# Patient Record
Sex: MALE | Race: WHITE | NOT HISPANIC OR LATINO | Employment: OTHER | URBAN - METROPOLITAN AREA
[De-identification: names, ages, dates, MRNs, and addresses within clinical notes are randomized per-mention and may not be internally consistent; named-entity substitution may affect disease eponyms.]

---

## 2020-12-29 PROBLEM — C61 PROSTATE CANCER (HCC): Status: ACTIVE | Noted: 2020-12-29

## 2020-12-30 ENCOUNTER — HOSPITAL ENCOUNTER (EMERGENCY)
Facility: HOSPITAL | Age: 75
Discharge: HOME | End: 2020-12-30
Attending: EMERGENCY MEDICINE
Payer: COMMERCIAL

## 2020-12-30 ENCOUNTER — APPOINTMENT (EMERGENCY)
Dept: RADIOLOGY | Facility: HOSPITAL | Age: 75
End: 2020-12-30
Payer: COMMERCIAL

## 2020-12-30 ENCOUNTER — OFFICE VISIT (OUTPATIENT)
Dept: FAMILY MEDICINE CLINIC | Facility: CLINIC | Age: 75
End: 2020-12-30
Payer: COMMERCIAL

## 2020-12-30 VITALS
HEIGHT: 69 IN | TEMPERATURE: 97.8 F | WEIGHT: 202.8 LBS | DIASTOLIC BLOOD PRESSURE: 82 MMHG | BODY MASS INDEX: 30.04 KG/M2 | SYSTOLIC BLOOD PRESSURE: 124 MMHG

## 2020-12-30 VITALS
WEIGHT: 203 LBS | HEIGHT: 69 IN | BODY MASS INDEX: 30.07 KG/M2 | DIASTOLIC BLOOD PRESSURE: 72 MMHG | OXYGEN SATURATION: 98 % | RESPIRATION RATE: 20 BRPM | SYSTOLIC BLOOD PRESSURE: 182 MMHG | HEART RATE: 74 BPM | TEMPERATURE: 97.9 F

## 2020-12-30 DIAGNOSIS — Z85.46 HISTORY OF PROSTATE CANCER: ICD-10-CM

## 2020-12-30 DIAGNOSIS — I48.91 ATRIAL FIBRILLATION, NEW ONSET (HCC): Primary | ICD-10-CM

## 2020-12-30 DIAGNOSIS — I49.1 PAC (PREMATURE ATRIAL CONTRACTION): Primary | ICD-10-CM

## 2020-12-30 DIAGNOSIS — I65.21 STENOSIS OF RIGHT CAROTID ARTERY: ICD-10-CM

## 2020-12-30 DIAGNOSIS — I63.81 LEFT SIDED LACUNAR INFARCTION (HCC): ICD-10-CM

## 2020-12-30 LAB
ALBUMIN SERPL BCP-MCNC: 3.8 G/DL (ref 3.5–5)
ALP SERPL-CCNC: 95 U/L (ref 46–116)
ALT SERPL W P-5'-P-CCNC: 42 U/L (ref 12–78)
ANION GAP SERPL CALCULATED.3IONS-SCNC: 9 MMOL/L (ref 4–13)
APTT PPP: 35 SECONDS (ref 23–37)
AST SERPL W P-5'-P-CCNC: 33 U/L (ref 5–45)
ATRIAL RATE: 62 BPM
BASOPHILS # BLD AUTO: 0.02 THOUSANDS/ΜL (ref 0–0.1)
BASOPHILS NFR BLD AUTO: 0 % (ref 0–1)
BILIRUB SERPL-MCNC: 1.06 MG/DL (ref 0.2–1)
BUN SERPL-MCNC: 13 MG/DL (ref 5–25)
CALCIUM SERPL-MCNC: 9.4 MG/DL (ref 8.3–10.1)
CHLORIDE SERPL-SCNC: 103 MMOL/L (ref 100–108)
CO2 SERPL-SCNC: 26 MMOL/L (ref 21–32)
CREAT SERPL-MCNC: 0.76 MG/DL (ref 0.6–1.3)
EOSINOPHIL # BLD AUTO: 0.05 THOUSAND/ΜL (ref 0–0.61)
EOSINOPHIL NFR BLD AUTO: 1 % (ref 0–6)
ERYTHROCYTE [DISTWIDTH] IN BLOOD BY AUTOMATED COUNT: 12.4 % (ref 11.6–15.1)
GFR SERPL CREATININE-BSD FRML MDRD: 88 ML/MIN/1.73SQ M
GLUCOSE SERPL-MCNC: 84 MG/DL (ref 65–140)
HCT VFR BLD AUTO: 46.6 % (ref 36.5–49.3)
HGB BLD-MCNC: 15.2 G/DL (ref 12–17)
IMM GRANULOCYTES # BLD AUTO: 0.01 THOUSAND/UL (ref 0–0.2)
IMM GRANULOCYTES NFR BLD AUTO: 0 % (ref 0–2)
INR PPP: 1.07 (ref 0.84–1.19)
LYMPHOCYTES # BLD AUTO: 1.71 THOUSANDS/ΜL (ref 0.6–4.47)
LYMPHOCYTES NFR BLD AUTO: 22 % (ref 14–44)
MAGNESIUM SERPL-MCNC: 2.4 MG/DL (ref 1.6–2.6)
MCH RBC QN AUTO: 30.2 PG (ref 26.8–34.3)
MCHC RBC AUTO-ENTMCNC: 32.6 G/DL (ref 31.4–37.4)
MCV RBC AUTO: 93 FL (ref 82–98)
MONOCYTES # BLD AUTO: 0.6 THOUSAND/ΜL (ref 0.17–1.22)
MONOCYTES NFR BLD AUTO: 8 % (ref 4–12)
NEUTROPHILS # BLD AUTO: 5.35 THOUSANDS/ΜL (ref 1.85–7.62)
NEUTS SEG NFR BLD AUTO: 69 % (ref 43–75)
NRBC BLD AUTO-RTO: 0 /100 WBCS
P AXIS: 61 DEGREES
PLATELET # BLD AUTO: 190 THOUSANDS/UL (ref 149–390)
PMV BLD AUTO: 10.7 FL (ref 8.9–12.7)
POTASSIUM SERPL-SCNC: 4.8 MMOL/L (ref 3.5–5.3)
PR INTERVAL: 166 MS
PROT SERPL-MCNC: 7.6 G/DL (ref 6.4–8.2)
PROTHROMBIN TIME: 13.7 SECONDS (ref 11.6–14.5)
QRS AXIS: 47 DEGREES
QRSD INTERVAL: 90 MS
QT INTERVAL: 422 MS
QTC INTERVAL: 428 MS
RBC # BLD AUTO: 5.03 MILLION/UL (ref 3.88–5.62)
SODIUM SERPL-SCNC: 138 MMOL/L (ref 136–145)
T WAVE AXIS: 38 DEGREES
TROPONIN I SERPL-MCNC: <0.02 NG/ML
TSH SERPL DL<=0.05 MIU/L-ACNC: 1.55 UIU/ML (ref 0.36–3.74)
VENTRICULAR RATE: 62 BPM
WBC # BLD AUTO: 7.74 THOUSAND/UL (ref 4.31–10.16)

## 2020-12-30 PROCEDURE — 80053 COMPREHEN METABOLIC PANEL: CPT | Performed by: EMERGENCY MEDICINE

## 2020-12-30 PROCEDURE — 99285 EMERGENCY DEPT VISIT HI MDM: CPT | Performed by: EMERGENCY MEDICINE

## 2020-12-30 PROCEDURE — 85025 COMPLETE CBC W/AUTO DIFF WBC: CPT | Performed by: EMERGENCY MEDICINE

## 2020-12-30 PROCEDURE — 85610 PROTHROMBIN TIME: CPT | Performed by: EMERGENCY MEDICINE

## 2020-12-30 PROCEDURE — 99215 OFFICE O/P EST HI 40 MIN: CPT | Performed by: FAMILY MEDICINE

## 2020-12-30 PROCEDURE — 84443 ASSAY THYROID STIM HORMONE: CPT | Performed by: EMERGENCY MEDICINE

## 2020-12-30 PROCEDURE — 71046 X-RAY EXAM CHEST 2 VIEWS: CPT

## 2020-12-30 PROCEDURE — 1160F RVW MEDS BY RX/DR IN RCRD: CPT | Performed by: FAMILY MEDICINE

## 2020-12-30 PROCEDURE — 85730 THROMBOPLASTIN TIME PARTIAL: CPT | Performed by: EMERGENCY MEDICINE

## 2020-12-30 PROCEDURE — 93010 ELECTROCARDIOGRAM REPORT: CPT | Performed by: INTERNAL MEDICINE

## 2020-12-30 PROCEDURE — 36415 COLL VENOUS BLD VENIPUNCTURE: CPT | Performed by: EMERGENCY MEDICINE

## 2020-12-30 PROCEDURE — 93005 ELECTROCARDIOGRAM TRACING: CPT

## 2020-12-30 PROCEDURE — 93000 ELECTROCARDIOGRAM COMPLETE: CPT | Performed by: FAMILY MEDICINE

## 2020-12-30 PROCEDURE — 1036F TOBACCO NON-USER: CPT | Performed by: FAMILY MEDICINE

## 2020-12-30 PROCEDURE — 83735 ASSAY OF MAGNESIUM: CPT | Performed by: EMERGENCY MEDICINE

## 2020-12-30 PROCEDURE — 84484 ASSAY OF TROPONIN QUANT: CPT | Performed by: EMERGENCY MEDICINE

## 2020-12-30 PROCEDURE — 99285 EMERGENCY DEPT VISIT HI MDM: CPT

## 2020-12-30 RX ORDER — CLOPIDOGREL BISULFATE 75 MG/1
75 TABLET ORAL DAILY
COMMUNITY
End: 2020-12-30 | Stop reason: SDUPTHER

## 2020-12-30 RX ORDER — CLOPIDOGREL BISULFATE 75 MG/1
75 TABLET ORAL DAILY
Qty: 90 TABLET | Refills: 1 | Status: SHIPPED | OUTPATIENT
Start: 2020-12-30 | End: 2021-06-22

## 2020-12-30 NOTE — ED PROVIDER NOTES
History  Chief Complaint   Patient presents with    Abnormal ECG     patient at PCP office, was told his heart was skipping a beat, denies chest pain or palpitations  History provided by:  Patient   used: No    Medical Problem  Quality:  Patient was at Dr office for Plavix refill, send to ER for abnormlaity noted on EKG, ?skipped beat, patient has no symptoms  Severity:  Mild  Duration: has no sympotms  Timing:  Unable to specify  Progression:  Unable to specify  Chronicity:  New  Context:  Patient was at Dr office for Plavix refill, send to ER for abnormlaity noted on EKG, ?skipped beat, patient has no symptoms  Relieved by:  Nothing  Worsened by:  Nothing  Ineffective treatments:  None  Associated symptoms: no abdominal pain, no chest pain, no cough, no diarrhea, no ear pain, no fever, no loss of consciousness, no rash, no shortness of breath, no sore throat and no vomiting    Risk factors:  Carotid stenosis      Prior to Admission Medications   Prescriptions Last Dose Informant Patient Reported? Taking? clopidogrel (PLAVIX) 75 mg tablet   No Yes   Sig: Take 1 tablet (75 mg total) by mouth daily      Facility-Administered Medications: None       Past Medical History:   Diagnosis Date    Cancer [facility])     prostate    Carotid stenosis     Hypertension        Past Surgical History:   Procedure Laterality Date    TONSILLECTOMY      TRANSURETHRAL RESECTION OF PROSTATE         Family History   Problem Relation Age of Onset    Colon cancer Mother     No Known Problems Father     Leukemia Maternal Grandmother     Diabetes Maternal Grandmother     Alcohol abuse Maternal Grandfather     No Known Problems Paternal Grandmother     No Known Problems Paternal Grandfather      I have reviewed and agree with the history as documented      E-Cigarette/Vaping     E-Cigarette/Vaping Substances     Social History     Tobacco Use    Smoking status: Former Smoker    Smokeless tobacco: Never Used   Substance Use Topics    Alcohol use: Yes    Drug use: Never       Review of Systems   Constitutional: Negative for chills and fever  HENT: Negative for ear pain and sore throat  Eyes: Negative for pain and visual disturbance  Respiratory: Negative for cough and shortness of breath  Cardiovascular: Negative for chest pain and palpitations  Gastrointestinal: Negative for abdominal pain, diarrhea and vomiting  Genitourinary: Negative for dysuria and hematuria  Musculoskeletal: Negative for arthralgias and back pain  Skin: Negative for color change and rash  Neurological: Negative for seizures, loss of consciousness and syncope  All other systems reviewed and are negative  Physical Exam  Physical Exam  Vitals signs and nursing note reviewed  Constitutional:       Appearance: He is well-developed  HENT:      Head: Normocephalic and atraumatic  Eyes:      Conjunctiva/sclera: Conjunctivae normal    Neck:      Musculoskeletal: Neck supple  Cardiovascular:      Rate and Rhythm: Normal rate and regular rhythm  Heart sounds: No murmur  Pulmonary:      Effort: Pulmonary effort is normal  No respiratory distress  Breath sounds: Normal breath sounds  Abdominal:      Palpations: Abdomen is soft  Tenderness: There is no abdominal tenderness  Skin:     General: Skin is warm and dry  Neurological:      Mental Status: He is alert           Vital Signs  ED Triage Vitals   Temperature Pulse Respirations Blood Pressure SpO2   12/30/20 1421 12/30/20 1421 12/30/20 1421 12/30/20 1423 12/30/20 1421   97 9 °F (36 6 °C) 75 18 (!) 213/98 98 %      Temp src Heart Rate Source Patient Position - Orthostatic VS BP Location FiO2 (%)   -- 12/30/20 1421 -- 12/30/20 1421 --    Monitor  Right arm       Pain Score       --                  Vitals:    12/30/20 1421 12/30/20 1423 12/30/20 1430   BP:  (!) 213/98 (!) 182/72   Pulse: 75  74         Visual Acuity      ED Medications  Medications - No data to display    Diagnostic Studies  Results Reviewed     Procedure Component Value Units Date/Time    Comprehensive metabolic panel [[id number]]  (Abnormal) Collected: 12/30/20 1437    Lab Status: Final result Specimen: Blood from Arm, Right Updated: 12/30/20 1528     Sodium 138 mmol/L      Potassium 4 8 mmol/L      Chloride 103 mmol/L      CO2 26 mmol/L      ANION GAP 9 mmol/L      BUN 13 mg/dL      Creatinine 0 76 mg/dL      Glucose 84 mg/dL      Calcium 9 4 mg/dL      AST 33 U/L      ALT 42 U/L      Alkaline Phosphatase 95 U/L      Total Protein 7 6 g/dL      Albumin 3 8 g/dL      Total Bilirubin 1 06 mg/dL      eGFR 88 ml/min/1 73sq m     Narrative:      [place] guidelines for Chronic Kidney Disease (CKD):     Stage 1 with normal or high GFR (GFR > 90 mL/min/1 73 square meters)    Stage 2 Mild CKD (GFR = 60-89 mL/min/1 73 square meters)    Stage 3A Moderate CKD (GFR = 45-59 mL/min/1 73 square meters)    Stage 3B Moderate CKD (GFR = 30-44 mL/min/1 73 square meters)    Stage 4 Severe CKD (GFR = 15-29 mL/min/1 73 square meters)    Stage 5 End Stage CKD (GFR <15 mL/min/1 73 square meters)  Note: GFR calculation is accurate only with a steady state creatinine    TSH [[id number]]  (Normal) Collected: 12/30/20 1437    Lab Status: Final result Specimen: Blood from Arm, Right Updated: 12/30/20 1528     TSH 3RD GENERATON 1 550 uIU/mL     Narrative:      Patients undergoing fluorescein dye angiography may retain small amounts of fluorescein in the body for 48-72 hours post procedure  Samples containing fluorescein can produce falsely depressed TSH values  If the patient had this procedure,a specimen should be resubmitted post fluorescein clearance        Magnesium [[id number]]  (Normal) Collected: 12/30/20 1437    Lab Status: Final result Specimen: Blood from Arm, Right Updated: 12/30/20 1528     Magnesium 2 4 mg/dL     Troponin I [[id number]]  (Normal) Collected: 12/30/20 1437    Lab Status: Final result Specimen: Blood from Arm, Right Updated: 12/30/20 1519     Troponin I <0 02 ng/mL     Protime-INR [[id number]]  (Normal) Collected: 12/30/20 1437    Lab Status: Final result Specimen: Blood from Arm, Right Updated: 12/30/20 1458     Protime 13 7 seconds      INR 1 07    APTT [[id number]]  (Normal) Collected: 12/30/20 1437    Lab Status: Final result Specimen: Blood from Arm, Right Updated: 12/30/20 1458     PTT 35 seconds     CBC and differential [[id number]] Collected: 12/30/20 1437    Lab Status: Final result Specimen: Blood from Arm, Right Updated: 12/30/20 1451     WBC 7 74 Thousand/uL      RBC 5 03 Million/uL      Hemoglobin 15 2 g/dL      Hematocrit 46 6 %      MCV 93 fL      MCH 30 2 pg      MCHC 32 6 g/dL      RDW 12 4 %      MPV 10 7 fL      Platelets 653 Thousands/uL      nRBC 0 /100 WBCs      Neutrophils Relative 69 %      Immat GRANS % 0 %      Lymphocytes Relative 22 %      Monocytes Relative 8 %      Eosinophils Relative 1 %      Basophils Relative 0 %      Neutrophils Absolute 5 35 Thousands/µL      Immature Grans Absolute 0 01 Thousand/uL      Lymphocytes Absolute 1 71 Thousands/µL      Monocytes Absolute 0 60 Thousand/µL      Eosinophils Absolute 0 05 Thousand/µL      Basophils Absolute 0 02 Thousands/µL                  XR chest 2 views   Final Result by Daniel Villa MD (12/30 1609)      No acute cardiopulmonary disease                    Workstation performed: [id number]                    Procedures  ECG 12 Lead Documentation Only    Date/Time: 12/30/2020 3:06 PM  Performed by: Handy Ruvalcaba MD  Authorized by: Handy Ruvalcaba MD     Indications / Diagnosis:  Abnormal EKG at Dr's office  ECG reviewed by me, the ED Provider: yes    Patient location:  ED  Interpretation:     Interpretation: normal    Rate:     ECG rate assessment: normal    Rhythm:     Rhythm: sinus rhythm    Ectopy:     Ectopy: PAC    QRS:     QRS axis:  Normal  Conduction:     Conduction: normal    ST segments: ST segments:  Normal  T waves:     T waves: normal               ED Course  ED Course as of Dec 30 2032   Wed Dec 30, 2020   1627 WBC: 7 74   1627 Hemoglobin: 15 2   1627 Platelet Count: 363   1627 Sodium: 138   1627 Potassium: 4 8   1627 Troponin I: <0 02   1627 Magnesium: 2 4   1627 Labs wnl  TSH 3RD GENERATON: 1 550   1628 CXR wnl       1628 EKG discussed with Diana s/b Dr Bryan Godfrey, no significant acute abnormality, PACs noted  1630 Patient is stable for discharge  HEART Risk Score      Most Recent Value   Heart Score Risk Calculator   History  0 Filed at: 12/30/2020 1630   ECG  0 Filed at: 12/30/2020 1630   Age  2 Filed at: 12/30/2020 1630   Risk Factors  0 Filed at: 12/30/2020 1630   Troponin  0 Filed at: 12/30/2020 1630   HEART Score  2 Filed at: 12/30/2020 1630                      SBIRT 22yo+      Most Recent Value   SBIRT (23 yo +)   In order to provide better care to our patients, we are screening all of our patients for alcohol and drug use  Would it be okay to ask you these screening questions? No Filed at: 12/30/2020 1425                    MDM  Number of Diagnoses or Management Options  PAC (premature atrial contraction): new and requires workup  Diagnosis management comments: Patient is a 75-year-old male, history of carotid stenosis, on Plavix, went for his regular appointment for Plavix refill, was noted to have skipped beat/abnormal EKG, sent from doctor's office for evaluation, patient denies any symptoms, no palpitations, chest pain, dyspnea, fever, cough, abdominal back pain  On exam, patient is conscious, alert, well-appearing, nontoxic, vital signs are stable, heart sounds are regular, pulses are bilaterally equal under regular, lungs were clear  Impression:  Admit on palpitation, PAC on EKG, will check cardiac workup, if normal, will discharge to follow up with PCP, may need further outpatient evaluation as per PCP         Amount and/or Complexity of Data Reviewed  Clinical lab tests: reviewed and ordered  Tests in the radiology section of CPT®: ordered and reviewed  Tests in the medicine section of CPT®: ordered and reviewed  Discuss the patient with other providers: yes (Cardiology)  Independent visualization of images, tracings, or specimens: yes        Disposition  Final diagnoses:   PAC (premature atrial contraction)     Time reflects when diagnosis was documented in both MDM as applicable and the Disposition within this note     Time User Action Codes Description Comment    12/30/2020  4:30 PM Columbia Silvia Add [I49 1] PAC (premature atrial contraction)       ED Disposition     ED Disposition Condition Date/Time Comment    Discharge Stable Wed Dec 30, 2020  4:30 PM Sherita Deelmira discharge to home/self care  Follow-up Information     Follow up With Specialties Details Why Contact Info    Aby Gary DO Family Medicine Schedule an appointment as soon as possible for a visit   [address] [place] [address]            Discharge Medication List as of 12/30/2020  4:31 PM      CONTINUE these medications which have NOT CHANGED    Details   clopidogrel (PLAVIX) 75 mg tablet Take 1 tablet (75 mg total) by mouth daily, Starting Wed 12/30/2020, Normal           No discharge procedures on file      PDMP Review       Value Time User    PDMP Reviewed  Yes 12/29/2020  8:44 PM Aby Gary DO          ED Provider  Electronically Signed by           Sixto Schulte MD  12/30/20 2032

## 2021-01-04 ENCOUNTER — OFFICE VISIT (OUTPATIENT)
Dept: FAMILY MEDICINE CLINIC | Facility: CLINIC | Age: 76
End: 2021-01-04
Payer: COMMERCIAL

## 2021-01-04 VITALS
WEIGHT: 206.4 LBS | HEIGHT: 69 IN | SYSTOLIC BLOOD PRESSURE: 132 MMHG | DIASTOLIC BLOOD PRESSURE: 82 MMHG | BODY MASS INDEX: 30.57 KG/M2 | TEMPERATURE: 97.1 F

## 2021-01-04 DIAGNOSIS — I63.81 LEFT SIDED LACUNAR INFARCTION (HCC): ICD-10-CM

## 2021-01-04 DIAGNOSIS — I49.1 PREMATURE ATRIAL CONTRACTION: Primary | ICD-10-CM

## 2021-01-04 DIAGNOSIS — I65.21 STENOSIS OF RIGHT CAROTID ARTERY: ICD-10-CM

## 2021-01-04 DIAGNOSIS — Z85.46 HISTORY OF PROSTATE CANCER: ICD-10-CM

## 2021-01-04 PROBLEM — I48.91 ATRIAL FIBRILLATION, NEW ONSET (HCC): Status: RESOLVED | Noted: 2020-12-30 | Resolved: 2021-01-04

## 2021-01-04 PROCEDURE — 36415 COLL VENOUS BLD VENIPUNCTURE: CPT | Performed by: FAMILY MEDICINE

## 2021-01-04 PROCEDURE — 99214 OFFICE O/P EST MOD 30 MIN: CPT | Performed by: FAMILY MEDICINE

## 2021-01-04 NOTE — PROGRESS NOTES
Assessment/Plan:    Left sided lacunar infarction (HCC)   Check lipid profile  Continue Plavix    Stenosis of right carotid artery    Check carotid duplex, check lipid profile  History of prostate cancer    Check PSA  Diagnoses and all orders for this visit:    Premature atrial contraction    Stenosis of right carotid artery  -     VAS carotid complete study; Future    History of prostate cancer    Left sided lacunar infarction ([address] )  -     VAS carotid complete study; Future          Subjective:   Chief Complaint   Patient presents with    Follow-up     ER          Patient ID: Krystle Ballard is a 66 y o  male  He is here for recent emergency room follow-up  I saw him as a new patient on the 30th, he was an irregular heart rhythm here in the office he was sent to the emergency room for further evaluation  EKG there was more suggestive of premature atrial contractions and 8 fibrillation and was felt that he was safe to be discharged  Blood pressure was elevated in the ED, it is improved here in the office today  Lab work was stable  He still needs to follow-up for his cholesterol, history of prostate cancer, and carotid stenosis  The following portions of the patient's history were reviewed and updated as appropriate: allergies, current medications, past family history, past medical history, past social history, past surgical history and problem list     Review of Systems   Constitutional: Negative for appetite change, chills, diaphoresis, fatigue, fever and unexpected weight change  HENT: Negative for congestion, ear pain, hearing loss, nosebleeds, postnasal drip, rhinorrhea, sinus pressure, sore throat, tinnitus and trouble swallowing  Eyes: Negative for photophobia, pain, discharge, redness, itching and visual disturbance  Respiratory: Negative for cough, chest tightness, shortness of breath and wheezing  Cardiovascular: Negative for chest pain, palpitations and leg swelling  Denies orthopnea , dyspnea on exertion   Gastrointestinal: Negative for abdominal distention, abdominal pain, blood in stool, constipation, diarrhea, nausea and vomiting  Endocrine: Negative  Genitourinary: Negative for difficulty urinating, dysuria, flank pain, frequency, hematuria and urgency  Denies nocturia , erectile dysfunction   Musculoskeletal: Negative for arthralgias, back pain, gait problem, joint swelling and myalgias  Skin: Negative for pallor, rash and wound  Denies skin lesions   Allergic/Immunologic: Negative for environmental allergies, food allergies and immunocompromised state  Neurological: Negative for dizziness, tremors, seizures, syncope, speech difficulty, weakness, numbness and headaches  Hematological: Negative for adenopathy  Does not bruise/bleed easily  Psychiatric/Behavioral: Negative for behavioral problems, confusion, sleep disturbance and suicidal ideas  The patient is not nervous/anxious  Objective:      /72   Temp (!) 97 1 °F (36 2 °C)   Ht 5' 9" (1 753 m)   Wt 93 6 kg (206 lb 6 4 oz)   BMI 30 48 kg/m²          Physical Exam  Constitutional:       Appearance: He is well-developed  HENT:      Head: Normocephalic and atraumatic  Right Ear: Tympanic membrane and ear canal normal       Left Ear: Tympanic membrane and ear canal normal       Nose: Nose normal    Eyes:      Conjunctiva/sclera: Conjunctivae normal       Pupils: Pupils are equal, round, and reactive to light  Neck:      Musculoskeletal: Normal range of motion and neck supple  Thyroid: No thyromegaly  Vascular: No JVD  Trachea: No tracheal deviation  Cardiovascular:      Rate and Rhythm: Normal rate and regular rhythm  Heart sounds: No murmur  Pulmonary:      Effort: Pulmonary effort is normal       Breath sounds: Normal breath sounds  No wheezing or rales  Abdominal:      General: Bowel sounds are normal       Palpations: Abdomen is soft  There is no mass  Tenderness: There is no abdominal tenderness  There is no guarding or rebound  Musculoskeletal:         General: No tenderness or deformity  Lymphadenopathy:      Cervical: No cervical adenopathy  Skin:     General: Skin is warm and dry  Findings: No lesion or rash  Nails: There is no clubbing  Neurological:      Mental Status: He is alert and oriented to person, place, and time  Cranial Nerves: No cranial nerve deficit  Motor: No abnormal muscle tone  Coordination: Coordination normal       Deep Tendon Reflexes: Reflexes are normal and symmetric   Reflexes normal    Psychiatric:         Judgment: Judgment normal

## 2021-01-05 ENCOUNTER — TELEPHONE (OUTPATIENT)
Dept: FAMILY MEDICINE CLINIC | Facility: CLINIC | Age: 76
End: 2021-01-05

## 2021-01-05 DIAGNOSIS — Z85.46 HISTORY OF PROSTATE CANCER: Primary | ICD-10-CM

## 2021-01-05 DIAGNOSIS — E78.2 MIXED HYPERLIPIDEMIA: ICD-10-CM

## 2021-01-05 DIAGNOSIS — I63.81 LEFT SIDED LACUNAR INFARCTION (HCC): ICD-10-CM

## 2021-01-05 LAB
CHOLEST SERPL-MCNC: 248 MG/DL
CHOLEST/HDLC SERPL: 5.1 (CALC)
HDLC SERPL-MCNC: 49 MG/DL
LDLC SERPL CALC-MCNC: 168 MG/DL (CALC)
NONHDLC SERPL-MCNC: 199 MG/DL (CALC)
PSA SERPL-MCNC: 0.2 NG/ML
TRIGL SERPL-MCNC: 165 MG/DL

## 2021-01-05 RX ORDER — ROSUVASTATIN CALCIUM 10 MG/1
10 TABLET, COATED ORAL DAILY
Qty: 90 TABLET | Refills: 1 | Status: SHIPPED | OUTPATIENT
Start: 2021-01-05 | End: 2021-06-22

## 2021-01-05 NOTE — TELEPHONE ENCOUNTER
----- Message from Bandar Villatoro DO sent at 1/5/2021  8:32 AM EST -----  PSA was 0 2 which is very low  Asked him if he remembers the name of the urologist who took care of him  Cholesterol is a little high, send him a low-cholesterol diet we will recheck lab in 6 months  He may want to consider being on a cholesterol-lowering medication which would decrease his risk of having another stroke  Let me know if he would be willing to do so

## 2021-01-15 ENCOUNTER — HOSPITAL ENCOUNTER (OUTPATIENT)
Dept: NON INVASIVE DIAGNOSTICS | Facility: HOSPITAL | Age: 76
Discharge: HOME | End: 2021-01-15
Payer: COMMERCIAL

## 2021-01-15 ENCOUNTER — TELEPHONE (OUTPATIENT)
Dept: FAMILY MEDICINE CLINIC | Facility: CLINIC | Age: 76
End: 2021-01-15

## 2021-01-15 DIAGNOSIS — I65.21 STENOSIS OF RIGHT CAROTID ARTERY: ICD-10-CM

## 2021-01-15 DIAGNOSIS — I63.81 LEFT SIDED LACUNAR INFARCTION (HCC): ICD-10-CM

## 2021-01-15 PROCEDURE — 93880 EXTRACRANIAL BILAT STUDY: CPT | Performed by: SURGERY

## 2021-01-15 PROCEDURE — 93880 EXTRACRANIAL BILAT STUDY: CPT

## 2021-01-17 DIAGNOSIS — I65.22 LEFT CAROTID ARTERY STENOSIS: Primary | ICD-10-CM

## 2021-01-17 DIAGNOSIS — I65.21 STENOSIS OF RIGHT CAROTID ARTERY: ICD-10-CM

## 2021-01-20 ENCOUNTER — IMMUNIZATIONS (OUTPATIENT)
Dept: FAMILY MEDICINE CLINIC | Facility: HOSPITAL | Age: 76
End: 2021-01-20

## 2021-01-20 DIAGNOSIS — Z23 ENCOUNTER FOR IMMUNIZATION: Primary | ICD-10-CM

## 2021-01-20 PROCEDURE — 0011A SARS-COV-2 / COVID-19 MRNA VACCINE (MODERNA) 100 MCG: CPT

## 2021-01-20 PROCEDURE — 91301 SARS-COV-2 / COVID-19 MRNA VACCINE (MODERNA) 100 MCG: CPT

## 2021-01-22 PROBLEM — I65.22 CAROTID STENOSIS, LEFT: Status: ACTIVE | Noted: 2021-01-22

## 2021-01-22 NOTE — ASSESSMENT & PLAN NOTE
67 y/o male former smoker w/HLD, hx prostate cancer, s/p TURP, chronic lacunar infarct L ventral thalamus, R carotid occlusion and 50-69% L carotid stenosis referred for carotid disease   -carotid duplex 1/15/21 w/occluded R ICA and 50-69% L ICA stenosis, velocity 168/54, ratio 1 68     -Previous duplex @ [facility] 8/29/19 w/patent R ICA but >70% stenosis  However, patient reports evaluation by [facility] physician in November 2019 was significant for "blockage of right carotid" that could not be addressed with intervention  -patient asymptomatic  Chronic L lacunar infarct, age indeterminate, on noncontrast CTH @ [facility] 11/17/19  -no vascular intervention for carotid occlusion  -continue surveillance of L ICA stenosis with carotid duplex Q 6 months  -continue plavix and statin therapy  -BP elevated during office visit today  Instructed patient to follow-up with PCP for management and evaluation  Continue to optimize BP management per PCP in the setting of carotid disease   -return to office in 1 year with carotid duplex for RFM  -instructed to contact the office with new concerns or symptoms    Educated in signs/symptoms of TIA/CVA and instructed to call 911 immediately

## 2021-01-22 NOTE — ASSESSMENT & PLAN NOTE
50-69% L ICA stenosis  -continue surveillance with duplex Q 6 months  -continue antiplatelet and statin therapy  -see plan as outlined above

## 2021-01-22 NOTE — PATIENT INSTRUCTIONS
Carotid Artery Disease   AMBULATORY CARE:   Carotid artery disease  is a condition that causes narrow or blocked carotid arteries  Your carotid arteries are the blood vessels that supply your brain with most of the blood it needs to work  You have 2 carotid arteries, one on each side of your neck  Call 911 for any of the following:   · You have any of the following signs of a stroke:      ¨ Numbness or drooping on one side of your face     ¨ Weakness in an arm or leg    ¨ Confusion or difficulty speaking    ¨ Dizziness, a severe headache, or vision loss    · You have any of the following signs of a heart attack:      ¨ Squeezing, pressure, or pain in your chest that lasts longer than 5 minutes or returns    ¨ Discomfort or pain in your back, neck, jaw, stomach, or arm     ¨ Trouble breathing    ¨ Nausea or vomiting    ¨ Lightheadedness or a sudden cold sweat, especially with chest pain or trouble breathing  Contact your healthcare provider if:   · You have questions or concerns about your condition or care  Signs and symptoms of carotid artery disease: You may have no signs or symptoms  Most commonly, carotid artery disease causes transient ischemic attacks (TIAs), or mini-strokes  You may have numbness, weakness, lack of movement, or vision or speech problems  A TIA goes away quickly and does not cause permanent damage  A TIA may be a warning sign that you are about to have a stroke  If you have any symptoms of a TIA or stroke, seek care immediately  Warning signs of a stroke: The word F A S T  can help you remember and recognize warning signs of a stroke  · F = Face:  One side of the face droops  · A = Arms:  One arm starts to drop when both arms are raised  · S = Speech:  Speech is slurred or sounds different than usual     · T = Time:  A person who is having a stroke needs to be seen immediately  A stroke is a medical emergency that needs immediate treatment   Some medicines and treatments work best if given within a few hours of a stroke  Treatment  for carotid artery disease depends on how narrow your arteries have become, your symptoms, and your general health  The goal of treatment is to lower your risk for a stroke  You may need any of the following:  · Take aspirin if directed  Your healthcare provider may suggest that you take an aspirin a day to prevent blood clots from forming in the carotid arteries  If your healthcare provider wants you to take aspirin daily, do not take acetaminophen or ibuprofen instead  · Control risk factors  High blood pressure, high cholesterol, heart disease, diabetes, and being overweight increase your risk for atherosclerosis  · Procedures can help open blocked arteries  A carotid endarterectomy is used to cut plaque out of the artery  An angioplasty is used to push the plaque against the artery wall with a balloon device  Sometimes a stent is placed during an angioplasty  A stent is a metal mesh tube that is placed in the artery to keep it open  Manage carotid artery disease:   · Eat a variety of healthy foods  Healthy foods include fruit, vegetables, whole-grain breads, low-fat dairy products, lean meat, and fish  Choose fish that are high in omega-3 fatty acids, such as salmon and fresh tuna  Ask your healthcare provider for more information on a heart healthy diet and the DASH eating plan  · Limit sodium (salt)  Sodium may increase your blood pressure  Add less table salt to your foods  Read food labels and choose foods that are low in sodium  Your healthcare provider may suggest you follow a low sodium diet  · Reach or maintain a healthy weight  Extra weight makes your heart work harder  Ask your healthcare provider how much you should weight  He can help you create a safe weight loss plan  Even a weight loss of 10% of your body weight can help your heart function better  · Exercise as directed    Exercise helps improve heart function and can help you manage your weight  Exercise can also help lower your cholesterol and blood sugar levels  Try to get at least 30 minutes of exercise at least 5 times each week  Try to be active every day  Your healthcare provider can help you create an exercise plan that works best for you  · Limit alcohol  Alcohol can increase your blood pressure and triglyceride levels  Men should limit alcohol to 2 drinks per day  Women should limit alcohol to 1 drink per day  A drink of alcohol is 12 ounces of beer, 5 ounces of wine, or 1½ ounces of liquor  · Do not smoke  Nicotine and other chemicals in cigarettes and cigars can cause heart and lung damage  Ask your healthcare provider for information if you currently smoke and need help to quit  E-cigarettes or smokeless tobacco still contain nicotine  Talk to your healthcare provider before you use these products  Follow up with your healthcare provider as directed:  Write down your questions so you remember to ask them during your visits  © 2017 [employer]  Information is for End User's use only and may not be sold, redistributed or otherwise used for commercial purposes  All illustrations and images included in CareNotes® are the copyrighted property of [employer]  or [employer]  The above information is an  only  It is not intended as medical advice for individual conditions or treatments  Talk to your doctor, nurse or pharmacist before following any medical regimen to see if it is safe and effective for you       -recent carotid ultrasound demonstrates occluded R carotid artery and moderate L carotid disease   -no vascular intervention indicated for carotid occlusion  -we will continue surveillance of L carotid stenosis with carotid ultrasound  Follow-up is based on degree of narrowing of artery and symptoms   Since >50% stenosis, we will follow you with carotid ultrasound every 6 months   -return to office in 1 year with carotid ultrasound for routine follow-up  -continue plavix and rosuvastatin therapy  -please contact the office in the interim with any questions, concerns or new symptoms  -call 911 immediately for signs or symptoms of TIA/CVA

## 2021-01-26 ENCOUNTER — TELEPHONE (OUTPATIENT)
Dept: NON INVASIVE DIAGNOSTICS | Facility: CLINIC | Age: 76
End: 2021-01-26

## 2021-01-26 NOTE — TELEPHONE ENCOUNTER
COVID Pre-Visit Screening     1  Is this a family member screening? No  2  Have you traveled outside of your state in the past 2 weeks? No  3  Do you presently have a fever or flu-like symptoms? No  4  Do you have symptoms of an upper respiratory infection like runny nose, sore throat, or cough? No  5  Are you suffering from new headache that you have not had in the past?  No  6  Do you have/have you experienced any new shortness of breath recently? No  7  Do you have any new diarrhea, nausea or vomiting? No  8  Have you been in contact with anyone who has been sick or diagnosed with COVID-19? No  9  Do you have any new loss of taste or smell? No  10  Are you able to wear a mask without a valve for the entire visit?  Yes
Pt will meet % of EER per day through PO intake plus ONS

## 2021-01-27 ENCOUNTER — CONSULT (OUTPATIENT)
Dept: VASCULAR SURGERY | Facility: CLINIC | Age: 76
End: 2021-01-27
Payer: COMMERCIAL

## 2021-01-27 VITALS
WEIGHT: 202.2 LBS | TEMPERATURE: 97.9 F | DIASTOLIC BLOOD PRESSURE: 94 MMHG | SYSTOLIC BLOOD PRESSURE: 182 MMHG | HEART RATE: 61 BPM | BODY MASS INDEX: 29.95 KG/M2 | HEIGHT: 69 IN

## 2021-01-27 DIAGNOSIS — I65.21 RIGHT INTERNAL CAROTID OCCLUSION: Primary | ICD-10-CM

## 2021-01-27 DIAGNOSIS — I63.81 LEFT SIDED LACUNAR INFARCTION (HCC): ICD-10-CM

## 2021-01-27 DIAGNOSIS — I65.22 LEFT CAROTID ARTERY STENOSIS: ICD-10-CM

## 2021-01-27 DIAGNOSIS — I65.22 CAROTID STENOSIS, LEFT: ICD-10-CM

## 2021-01-27 DIAGNOSIS — I65.21 STENOSIS OF RIGHT CAROTID ARTERY: ICD-10-CM

## 2021-01-27 PROCEDURE — 1160F RVW MEDS BY RX/DR IN RCRD: CPT | Performed by: PHYSICIAN ASSISTANT

## 2021-01-27 PROCEDURE — 1036F TOBACCO NON-USER: CPT | Performed by: PHYSICIAN ASSISTANT

## 2021-01-27 PROCEDURE — 99204 OFFICE O/P NEW MOD 45 MIN: CPT | Performed by: PHYSICIAN ASSISTANT

## 2021-01-27 NOTE — PROGRESS NOTES
Assessment/Plan:    Right internal carotid occlusion  65 y/o male former smoker w/HLD, hx prostate cancer, s/p TURP, chronic lacunar infarct L ventral thalamus, R carotid occlusion and 50-69% L carotid stenosis referred for carotid disease   -carotid duplex 1/15/21 w/occluded R ICA and 50-69% L ICA stenosis, velocity 168/54, ratio 1 68     -Previous duplex @ [facility] 8/29/19 w/patent R ICA but >70% stenosis  However, patient reports evaluation by [facility] physician in November 2019 was significant for "blockage of right carotid" that could not be addressed with intervention  -patient asymptomatic  Chronic L lacunar infarct, age indeterminate, on noncontrast CTH @ [facility] 11/17/19  -no vascular intervention for carotid occlusion  -continue surveillance of L ICA stenosis with carotid duplex Q 6 months  -continue plavix and statin therapy  -BP elevated during office visit today  Instructed patient to follow-up with PCP for management and evaluation  Continue to optimize BP management per PCP in the setting of carotid disease   -return to office in 1 year with carotid duplex for [facility]  -instructed to contact the office with new concerns or symptoms  Educated in signs/symptoms of TIA/CVA and instructed to call 911 immediately    Carotid stenosis, left  50-69% L ICA stenosis  -continue surveillance with duplex Q 6 months  -continue antiplatelet and statin therapy  -see plan as outlined above    Left sided lacunar infarction [facility])  Old left lacunar infarct on noncontrast CT head 11/17/2019  -no history of symptomatic CVA       Diagnoses and all orders for this visit:    Right internal carotid occlusion  -     VAS carotid complete study; Future  -     VAS carotid complete study; Future    Carotid stenosis, left  -     VAS carotid complete study; Future  -     VAS carotid complete study;  Future    Left sided lacunar infarction [facility])    Left carotid artery stenosis  -     Ambulatory referral to Vascular Surgery    Stenosis of right carotid artery  -     Ambulatory referral to Vascular Surgery          Subjective:      Patient ID: Wilmer Shafer is a 66 y o  male  Pt is new to our office  He was referred Ruy DO Carolann for carotid stenosis  Pt had a CV done 1/15/2021  He denies any headaches, dizziness, sudden loss of vision, slurred speech, TIA and Stroke symptoms  Pt is taking Plavix and Rosuvastatin daily  Pt is a former smoker  67 y/o male former smoker w/HLD, hx prostate cancer, s/p TURP, chronic lacunar infarct L ventral thalamus, R carotid occlusion and 50-69% L carotid stenosis referred by PCP for carotid disease  Patient reports a history of fall in November 2019 prompting evaluation at [facility]  Noncontrast CT of the head 11/17/2019 demonstrated chronic left lacunar infarct in the ventral thalamus, age indeterminate  Carotid duplex 8/29/2019 at [facility] demonstrated >70% R ICA stenosis and < 50% L ICA stenosis  Patient reports he was evaluated by a vascular surgeon at that time who stated his right carotid was blocked and no intervention was recommended  No reports of CTA available  Recent carotid duplex 1/15/21 reviewed w/occluded R ICA and 50-69% L ICA stenosis, velocity 168/54, ratio 1 68  Patient asymptomatic and denies aphasia, dysarthria, ataxia, lateralizing extremity weakness, amaurosis fugax or facial droop  He denies previous symptoms and was unaware of lacunar infarct prior to CT imaging in 2019  Patient on Plavix and statin therapy  The following portions of the patient's history were reviewed and updated as appropriate: allergies, current medications, past family history, past medical history, past social history, past surgical history and problem list     Review of Systems   Constitutional: Negative  HENT: Positive for postnasal drip  Eyes: Positive for visual disturbance  Respiratory: Negative  Cardiovascular: Negative  Gastrointestinal: Negative  Endocrine: Negative      Genitourinary: Negative  Musculoskeletal: Positive for arthralgias, back pain and myalgias  Skin: Negative  Allergic/Immunologic: Positive for environmental allergies  Neurological: Negative  Hematological: Bruises/bleeds easily  Psychiatric/Behavioral: Negative  I have reviewed and made appropriate changes to the review of systems input by the medical assistant  Vitals:    01/27/21 1116 01/27/21 1120   BP: (!) 176/92 (!) 182/94   BP Location: Left arm Right arm   Patient Position: Sitting Sitting   Cuff Size: Standard Standard   Pulse: 61    Temp: 97 9 °F (36 6 °C)    TempSrc: Tympanic    Weight: 91 7 kg (202 lb 3 2 oz)    Height: 5' 9" (1 753 m)        Patient Active Problem List   Diagnosis    History of prostate cancer    Right internal carotid occlusion    Left sided lacunar infarction (HCC)    Premature atrial contraction    Carotid stenosis, left       Past Surgical History:   Procedure Laterality Date    TONSILLECTOMY      TRANSURETHRAL RESECTION OF PROSTATE         Family History   Problem Relation Age of Onset    Colon cancer Mother     No Known Problems Father     Leukemia Maternal Grandmother     Diabetes Maternal Grandmother     Alcohol abuse Maternal Grandfather     No Known Problems Paternal Grandmother     No Known Problems Paternal Grandfather        Social History     Socioeconomic History    Marital status: /Civil Union     Spouse name: Not on file    Number of children: Not on file    Years of education: Not on file    Highest education level: Not on file   Occupational History    Not on file   Social Needs    Financial resource strain: Not on file    Food insecurity     Worry: Not on file     Inability: Not on file    Transportation needs     Medical: Not on file     Non-medical: Not on file   Tobacco Use    Smoking status: Former Smoker    Smokeless tobacco: Never Used   Substance and Sexual Activity    Alcohol use:  Yes    Drug use: Never    Sexual activity: Not on file   Lifestyle    Physical activity     Days per week: Not on file     Minutes per session: Not on file    Stress: Not on file   Relationships    Social connections     Talks on phone: Not on file     Gets together: Not on file     Attends [employer] service: Not on file     Active member of club or organization: Not on file     Attends meetings of clubs or organizations: Not on file     Relationship status: Not on file    Intimate partner violence     Fear of current or ex partner: Not on file     Emotionally abused: Not on file     Physically abused: Not on file     Forced sexual activity: Not on file   Other Topics Concern    Not on file   Social History Narrative    Not on file       No Known Allergies      Current Outpatient Medications:     clopidogrel (PLAVIX) 75 mg tablet, Take 1 tablet (75 mg total) by mouth daily, Disp: 90 tablet, Rfl: 1    rosuvastatin (CRESTOR) 10 MG tablet, Take 1 tablet (10 mg total) by mouth daily, Disp: 90 tablet, Rfl: 1    Objective:  Imaging study:  Carotid duplex 1/15/2021:  Imaging study reviewed and as described above  See full report below:  Right        Impression  PSV  EDV (cm/s)  Direction of Flow  Ratio    Dist  ICA    Occluded                                                 Mid  ICA     Occluded                                                 Prox   ICA    Occluded                                                 Dist CCA                  64          11                              Mid CCA                   80          13                      0 78    Prox CCA                 102           0                      1 79    Ext Carotid              122          13                      1 53    Prox Vert                 32           8  Antegrade                   Subclavian               259           0                              Innominate                57           0                                 Left         Impression  PSV  EDV (cm/s)  Direction of Flow  Ratio    Dist  ICA                 87          25                      0 87    Mid  ICA                 129          39                      1 29    Prox  ICA    50 - 69%    168          54                      1 68    Dist CCA                  91          16                              Mid CCA                  100          27                      0 95    Prox CCA                 105          20                              Ext Carotid              154          15                      1 54    Prox Vert                 62          17  Antegrade                   Subclavian               176           0                                       CONCLUSION:     Impression:  RIGHT:  The internal carotid artery appears occluded  Vertebral artery flow is antegrade  Elevated velocities noted in the subclavian  artery  LEFT:  There is 50-69% stenosis noted in the internal carotid artery  Plaque is  heterogenous and irregular  Vertebral artery flow is antegrade  There is no significant subclavian artery  disease  No previous study for comparison    BP (!) 182/94 (BP Location: Right arm, Patient Position: Sitting, Cuff Size: Standard)   Pulse 61   Temp 97 9 °F (36 6 °C) (Tympanic)   Ht 5' 9" (1 753 m)   Wt 91 7 kg (202 lb 3 2 oz)   BMI 29 86 kg/m²          Physical Exam  Vitals signs and nursing note reviewed  Constitutional:       General: He is not in acute distress  Appearance: He is well-developed  HENT:      Head: Normocephalic and atraumatic  Eyes:      General: No scleral icterus  Conjunctiva/sclera: Conjunctivae normal       Pupils: Pupils are equal, round, and reactive to light  Neck:      Musculoskeletal: Normal range of motion and neck supple  Vascular: No carotid bruit or JVD  Trachea: No tracheal deviation  Cardiovascular:      Rate and Rhythm: Normal rate and regular rhythm  Pulses:           Carotid pulses are 2+ on the right side and 2+ on the left side  Radial pulses are 2+ on the right side and 2+ on the left side  Heart sounds: S1 normal and S2 normal  No murmur  No friction rub  No gallop  No S3 sounds  Pulmonary:      Effort: No respiratory distress  Breath sounds: Normal breath sounds  No stridor  No wheezing, rhonchi or rales  Abdominal:      General: Bowel sounds are normal  There is no distension or abdominal bruit  Palpations: Abdomen is soft  There is no mass or pulsatile mass  Tenderness: There is no abdominal tenderness  There is no rebound  Musculoskeletal: Normal range of motion  General: No deformity  Right lower leg: No edema  Left lower leg: No edema  Skin:     General: Skin is warm and dry  Coloration: Skin is not pale  Findings: No erythema or lesion  Neurological:      General: No focal deficit present  Mental Status: He is alert and oriented to person, place, and time  Cranial Nerves: No dysarthria or facial asymmetry  Sensory: Sensation is intact  No sensory deficit  Motor: Motor function is intact  No weakness  Comments: Smile symmetrical   Tongue midline  Muscle strength 5/5 bilaterally in upper and lower extremities and equal bilaterally   Psychiatric:         Mood and Affect: Mood normal          Thought Content:  Thought content normal

## 2021-02-15 ENCOUNTER — IMMUNIZATIONS (OUTPATIENT)
Dept: FAMILY MEDICINE CLINIC | Facility: HOSPITAL | Age: 76
End: 2021-02-15

## 2021-02-15 DIAGNOSIS — Z23 ENCOUNTER FOR IMMUNIZATION: Primary | ICD-10-CM

## 2021-02-15 PROCEDURE — 91301 SARS-COV-2 / COVID-19 MRNA VACCINE (MODERNA) 100 MCG: CPT

## 2021-02-15 PROCEDURE — 0012A SARS-COV-2 / COVID-19 MRNA VACCINE (MODERNA) 100 MCG: CPT

## 2021-06-22 DIAGNOSIS — I65.21 STENOSIS OF RIGHT CAROTID ARTERY: ICD-10-CM

## 2021-06-22 DIAGNOSIS — I63.81 LEFT SIDED LACUNAR INFARCTION (HCC): ICD-10-CM

## 2021-06-22 DIAGNOSIS — E78.2 MIXED HYPERLIPIDEMIA: ICD-10-CM

## 2021-06-22 RX ORDER — ROSUVASTATIN CALCIUM 10 MG/1
TABLET, COATED ORAL
Qty: 90 TABLET | Refills: 1 | Status: SHIPPED | OUTPATIENT
Start: 2021-06-22 | End: 2021-10-08 | Stop reason: HOSPADM

## 2021-06-22 RX ORDER — CLOPIDOGREL BISULFATE 75 MG/1
TABLET ORAL
Qty: 90 TABLET | Refills: 1 | Status: SHIPPED | OUTPATIENT
Start: 2021-06-22 | End: 2021-10-15

## 2021-06-28 ENCOUNTER — RA CDI HCC (OUTPATIENT)
Dept: OTHER | Facility: HOSPITAL | Age: 76
End: 2021-06-28

## 2021-06-28 NOTE — PROGRESS NOTES
[facility] 75  coding opportunities             Chart reviewed, (number of) suggestions sent to provider: 1        Provider Rejected Suggestions for: [id number]      Number of suggestions actually used: 0      Number of suggestions NOT actually used: 1     Patients insurance company: [employer] (Medicare Advantage and [employer])   premature atrial contraction on notes-provider rejected afib-reassess if should ask or not on next visit LM  Visit status: Patient arrived for their scheduled appointment        [facility]  coding opportunities             Chart reviewed, (number of) suggestions sent to provider: 1                  Patients insurance company: [employer] (Medicare Advantage and [employer])           [facility] [employer]  coding opportunities             Chart reviewed, (number of) suggestions sent to provider: 1   DX:  I48 91-Unspecified atrial fibrillation                 Patients insurance company: [employer] ([employer])

## 2021-07-06 ENCOUNTER — OFFICE VISIT (OUTPATIENT)
Dept: FAMILY MEDICINE CLINIC | Facility: CLINIC | Age: 76
End: 2021-07-06
Payer: COMMERCIAL

## 2021-07-06 VITALS
DIASTOLIC BLOOD PRESSURE: 82 MMHG | WEIGHT: 205.4 LBS | SYSTOLIC BLOOD PRESSURE: 132 MMHG | BODY MASS INDEX: 30.42 KG/M2 | HEIGHT: 69 IN

## 2021-07-06 DIAGNOSIS — Z85.46 HISTORY OF PROSTATE CANCER: ICD-10-CM

## 2021-07-06 DIAGNOSIS — Z66 DNR (DO NOT RESUSCITATE): ICD-10-CM

## 2021-07-06 DIAGNOSIS — I65.22 CAROTID STENOSIS, LEFT: ICD-10-CM

## 2021-07-06 DIAGNOSIS — Z00.00 MEDICARE ANNUAL WELLNESS VISIT, INITIAL: ICD-10-CM

## 2021-07-06 DIAGNOSIS — I49.1 PREMATURE ATRIAL CONTRACTION: ICD-10-CM

## 2021-07-06 DIAGNOSIS — I63.81 LEFT SIDED LACUNAR INFARCTION (HCC): Primary | ICD-10-CM

## 2021-07-06 DIAGNOSIS — E78.5 DYSLIPIDEMIA: ICD-10-CM

## 2021-07-06 DIAGNOSIS — Z12.83 SCREENING FOR SKIN CANCER: ICD-10-CM

## 2021-07-06 DIAGNOSIS — L57.0 ACTINIC KERATOSIS: ICD-10-CM

## 2021-07-06 DIAGNOSIS — I65.21 RIGHT INTERNAL CAROTID OCCLUSION: ICD-10-CM

## 2021-07-06 PROCEDURE — 99214 OFFICE O/P EST MOD 30 MIN: CPT | Performed by: FAMILY MEDICINE

## 2021-07-06 PROCEDURE — 1036F TOBACCO NON-USER: CPT | Performed by: FAMILY MEDICINE

## 2021-07-06 PROCEDURE — 36415 COLL VENOUS BLD VENIPUNCTURE: CPT | Performed by: FAMILY MEDICINE

## 2021-07-06 PROCEDURE — 3288F FALL RISK ASSESSMENT DOCD: CPT | Performed by: FAMILY MEDICINE

## 2021-07-06 PROCEDURE — 1170F FXNL STATUS ASSESSED: CPT | Performed by: FAMILY MEDICINE

## 2021-07-06 PROCEDURE — 1160F RVW MEDS BY RX/DR IN RCRD: CPT | Performed by: FAMILY MEDICINE

## 2021-07-06 PROCEDURE — 1125F AMNT PAIN NOTED PAIN PRSNT: CPT | Performed by: FAMILY MEDICINE

## 2021-07-06 PROCEDURE — G0438 PPPS, INITIAL VISIT: HCPCS | Performed by: FAMILY MEDICINE

## 2021-07-06 PROCEDURE — 3725F SCREEN DEPRESSION PERFORMED: CPT | Performed by: FAMILY MEDICINE

## 2021-07-06 NOTE — PROGRESS NOTES
Assessment/Plan:    DNR (do not resuscitate)   Patient does not want any heroic measures done under any circumstances  Left sided lacunar infarction (HCC)    Continue Plavix and Crestor  [address] lab today  Follow-up in 6 months    Premature atrial contraction   Stable  Continue to observe    Carotid stenosis, left   Following with vascular surgery, supposed to have a repeat scan in the near future  Right internal carotid occlusion    No intervention possible  Actinic keratosis    Refer to dermatology  Diagnoses and all orders for this visit:    Left sided lacunar infarction ([address] )  -     Comprehensive metabolic panel  -     CBC and differential  -     TSH, 3rd generation    Right internal carotid occlusion    Carotid stenosis, left  -     Comprehensive metabolic panel  -     CBC and differential  -     TSH, 3rd generation    Premature atrial contraction    History of prostate cancer    Medicare annual wellness visit, initial    BMI 33 0-33 9,adult    Actinic keratosis  -     Ambulatory referral to Dermatology; Future    Screening for skin cancer  -     Ambulatory referral to Dermatology; Future    DNR (do not resuscitate)    Dyslipidemia  -     Comprehensive metabolic panel  -     Lipid panel  -     CBC and differential  -     TSH, 3rd generation          Subjective:      Patient ID: Mindi Stewart is a 66 y o  male  He is here for follow-up on his history of carotid disease, dyslipidemia, lacunar infarction, premature atrial contractions, and a general checkup  He is due for lab work  He did see vascular surgery, and is supposed to have a repeat carotid scan done in the new near future  He still works as an , most days  He will drink up to 10 beverages a week, but most weeks he drinks much less than that        The following portions of the patient's history were reviewed and updated as appropriate: allergies, current medications, past family history, past medical history, past social history, past surgical history and problem list     Review of Systems   Constitutional: Negative for appetite change, chills, diaphoresis, fatigue, fever and unexpected weight change  HENT: Negative for congestion, ear pain, hearing loss, nosebleeds, postnasal drip, rhinorrhea, sinus pressure, sore throat, tinnitus and trouble swallowing  Eyes: Negative for photophobia, pain, discharge, redness, itching and visual disturbance  Respiratory: Negative for cough, chest tightness, shortness of breath and wheezing  Cardiovascular: Negative for chest pain, palpitations and leg swelling  Denies orthopnea , dyspnea on exertion   Gastrointestinal: Negative for abdominal distention, abdominal pain, blood in stool, constipation, diarrhea, nausea and vomiting  Endocrine: Negative  Genitourinary: Negative for difficulty urinating, dysuria, flank pain, frequency, hematuria and urgency  Denies nocturia , erectile dysfunction   Musculoskeletal: Negative for arthralgias, back pain, gait problem, joint swelling and myalgias  Skin: Negative for pallor, rash and wound  Denies skin lesions   Allergic/Immunologic: Negative for environmental allergies, food allergies and immunocompromised state  Neurological: Negative for dizziness, tremors, seizures, syncope, speech difficulty, weakness, numbness and headaches  Hematological: Negative for adenopathy  Does not bruise/bleed easily  Psychiatric/Behavioral: Negative for behavioral problems, confusion, sleep disturbance and suicidal ideas  The patient is not nervous/anxious  Objective:      /82   Ht 5' 9" (1 753 m)   Wt 93 2 kg (205 lb 6 4 oz)   BMI 30 33 kg/m²          Physical Exam  Constitutional:       Appearance: He is well-developed  HENT:      Head: Normocephalic and atraumatic        Right Ear: Tympanic membrane and ear canal normal       Left Ear: Tympanic membrane and ear canal normal       Nose: Nose normal  Eyes:      Conjunctiva/sclera: Conjunctivae normal       Pupils: Pupils are equal, round, and reactive to light  Neck:      Thyroid: No thyromegaly  Vascular: No JVD  Trachea: No tracheal deviation  Cardiovascular:      Rate and Rhythm: Normal rate and regular rhythm  Occasional extrasystoles are present  Pulses: Normal pulses  Heart sounds: No murmur heard  Pulmonary:      Effort: Pulmonary effort is normal       Breath sounds: Normal breath sounds  No wheezing or rales  Abdominal:      General: Bowel sounds are normal       Palpations: Abdomen is soft  There is no mass  Tenderness: There is no abdominal tenderness  There is no guarding or rebound  Musculoskeletal:         General: No tenderness or deformity  Cervical back: Normal range of motion and neck supple  Lymphadenopathy:      Cervical: No cervical adenopathy  Skin:     General: Skin is warm and dry  Findings: No lesion or rash  Nails: There is no clubbing  Comments: Multiple actinic keratosis ease of the face, and scalp  Numerous cherry hemangiomas and strawberry hemangiomas of the torso  Multiple seborrheic keratoses  Some non suspicious pigmented nevi  Neurological:      Mental Status: He is alert and oriented to person, place, and time  Cranial Nerves: No cranial nerve deficit  Motor: No abnormal muscle tone  Coordination: Coordination normal       Deep Tendon Reflexes: Reflexes are normal and symmetric   Reflexes normal    Psychiatric:         Judgment: Judgment normal

## 2021-07-06 NOTE — PATIENT INSTRUCTIONS

## 2021-07-06 NOTE — PROGRESS NOTES
Assessment and Plan:     Problem List Items Addressed This Visit     None        BMI Counseling: Body mass index is 30 33 kg/m²  The BMI is above normal  Nutrition recommendations include moderation in carbohydrate intake, increasing intake of lean protein and reducing intake of saturated and trans fat  Exercise recommendations include exercising 3-5 times per week  Preventive health issues were discussed with patient, and age appropriate screening tests were ordered as noted in patient's After Visit Summary  Personalized health advice and appropriate referrals for health education or preventive services given if needed, as noted in patient's After Visit Summary       History of Present Illness:     Patient presents for Medicare Annual Wellness visit    Patient Care Team:  Eloisa Taylor DO as PCP - General (Family Medicine)     Problem List:     Patient Active Problem List   Diagnosis    History of prostate cancer    Right internal carotid occlusion    Left sided lacunar infarction ([facility] [address] )    Premature atrial contraction    Carotid stenosis, left      Past Medical and Surgical History:     Past Medical History:   Diagnosis Date    Cancer [facility])     prostate    Carotid stenosis     Hypertension      Past Surgical History:   Procedure Laterality Date    TONSILLECTOMY      TRANSURETHRAL RESECTION OF PROSTATE        Family History:     Family History   Problem Relation Age of Onset    Colon cancer Mother     No Known Problems Father     Leukemia Maternal Grandmother     Diabetes Maternal Grandmother     Alcohol abuse Maternal Grandfather     No Known Problems Paternal Grandmother     No Known Problems Paternal Grandfather       Social History:     Social History     Socioeconomic History    Marital status: /Civil Union     Spouse name: None    Number of children: None    Years of education: None    Highest education level: None   Occupational History    None   Tobacco Use    Smoking status: Former Smoker    Smokeless tobacco: Never Used   Substance and Sexual Activity    Alcohol use: Yes    Drug use: Never    Sexual activity: None   Other Topics Concern    None   Social History Narrative    None     Social Determinants of Health     Financial Resource Strain:     Difficulty of Paying Living Expenses:    Food Insecurity:     Worried About Running Out of Food in the Last Year:     [address] in the Last Year:    Transportation Needs:     Lack of Transportation (Medical):  Lack of Transportation (Non-Medical):    Physical Activity:     Days of Exercise per Week:     Minutes of Exercise per Session:    Stress:     Feeling of Stress :    Social Connections:     Frequency of Communication with Friends and Family:     Frequency of Social Gatherings with Friends and Family:     Attends [employer] Services:     Active Member of Clubs or Organizations:     Attends Club or Organization Meetings:     Marital Status:    Intimate Partner Violence:     Fear of Current or Ex-Partner:     Emotionally Abused:     Physically Abused:     Sexually Abused:       Medications and Allergies:     Current Outpatient Medications   Medication Sig Dispense Refill    clopidogrel (PLAVIX) 75 mg tablet TAKE 1 TABLET BY MOUTH EVERY DAY 90 tablet 1    rosuvastatin (CRESTOR) 10 MG tablet TAKE 1 TABLET BY MOUTH EVERY DAY 90 tablet 1     No current facility-administered medications for this visit       No Known Allergies   Immunizations:     Immunization History   Administered Date(s) Administered    SARS-CoV-2 / COVID-19 mRNA IM (Moderna) 01/20/2021, 02/15/2021    Tdap 11/17/2019      Health Maintenance:         Topic Date Due    Hepatitis C Screening  Never done         Topic Date Due    Pneumococcal Vaccine: 65+ Years (1 of 1 - PPSV23) Never done    Influenza Vaccine (1) 09/01/2021      Medicare Health Risk Assessment:     /84   Ht 5' 9" (1 753 m)   Wt 93 2 kg (205 lb 6 4 oz)   BMI 30 33 kg/m²      Karynaalexandr Hadley is here for his Initial Wellness visit  Health Risk Assessment:   Patient rates overall health as fair  Patient feels that their physical health rating is same  Patient is satisfied with their life  Eyesight was rated as same  Hearing was rated as same  Patient feels that their emotional and mental health rating is same  Patients states they are never, rarely angry  Patient states they are often unusually tired/fatigued  Pain experienced in the last 7 days has been none  Patient states that he has experienced no weight loss or gain in last 6 months  Depression Screening:   PHQ-2 Score: 0      Fall Risk Screening: In the past year, patient has experienced: no history of falling in past year      Home Safety:  Patient does not have trouble with stairs inside or outside of their home  Patient has working smoke alarms and has working carbon monoxide detector  Home safety hazards include: none  Nutrition:   Current diet is Regular  Medications:   Patient is currently taking over-the-counter supplements  OTC medications include: see medication list  Patient is able to manage medications  Activities of Daily Living (ADLs)/Instrumental Activities of Daily Living (IADLs):   Walk and transfer into and out of bed and chair?: Yes  Dress and groom yourself?: Yes    Bathe or shower yourself?: Yes    Feed yourself?  Yes  Do your laundry/housekeeping?: Yes  Manage your money, pay your bills and track your expenses?: Yes  Make your own meals?: Yes    Do your own shopping?: Yes    Previous Hospitalizations:   Any hospitalizations or ED visits within the last 12 months?: No      Advance Care Planning:   Living will: No    Durable POA for healthcare: No    Advanced directive: No    End of Life Decisions reviewed with patient: Yes    Provider agrees with end of life decisions: Yes      Comments: DNR    Cognitive Screening:   Provider or family/friend/caregiver concerned regarding cognition?: No    PREVENTIVE SCREENINGS      Cardiovascular Screening:    General: History Lipid Disorder and Risks and Benefits Discussed    Due for: Lipid Panel      Diabetes Screening:     General: Screening Current      Colorectal Cancer Screening:     General: Patient Declines      Prostate Cancer Screening:    General: History Prostate Cancer      Osteoporosis Screening:    General: Screening Not Indicated      Abdominal Aortic Aneurysm (AAA) Screening:    Risk factors include: tobacco use        Lung Cancer Screening:     General: Screening Not Indicated      Hepatitis C Screening:    General: Screening Not Indicated    Screening, Brief Intervention, and Referral to Treatment (SBIRT)    Screening    Typical number of drinks in a week: 10    Single Item Drug Screening:  How often have you used an illegal drug (including marijuana) or a prescription medication for non-medical reasons in the past year? never    Single Item Drug Screen Score: 0  Interpretation: Negative screen for possible drug use disorder    Brief Intervention  Alcohol & drug use screenings were reviewed  No concerns regarding substance use disorder identified  Healthy alcohol use/limits discussed  Other Counseling Topics:   Car/seat belt/driving safety, skin self-exam, sunscreen and regular weightbearing exercise and calcium and vitamin D intake         Rodney Zavala, DO

## 2021-07-07 LAB
ALBUMIN SERPL-MCNC: 4.4 G/DL (ref 3.6–5.1)
ALBUMIN/GLOB SERPL: 2 (CALC) (ref 1–2.5)
ALP SERPL-CCNC: 82 U/L (ref 35–144)
ALT SERPL-CCNC: 35 U/L (ref 9–46)
AST SERPL-CCNC: 22 U/L (ref 10–35)
BASOPHILS # BLD AUTO: 13 CELLS/UL (ref 0–200)
BASOPHILS NFR BLD AUTO: 0.2 %
BILIRUB SERPL-MCNC: 0.9 MG/DL (ref 0.2–1.2)
BUN SERPL-MCNC: 13 MG/DL (ref 7–25)
BUN/CREAT SERPL: NORMAL (CALC) (ref 6–22)
CALCIUM SERPL-MCNC: 9.4 MG/DL (ref 8.6–10.3)
CHLORIDE SERPL-SCNC: 104 MMOL/L (ref 98–110)
CHOLEST SERPL-MCNC: 156 MG/DL
CHOLEST/HDLC SERPL: 2.8 (CALC)
CO2 SERPL-SCNC: 26 MMOL/L (ref 20–32)
CREAT SERPL-MCNC: 0.89 MG/DL (ref 0.7–1.18)
EOSINOPHIL # BLD AUTO: 70 CELLS/UL (ref 15–500)
EOSINOPHIL NFR BLD AUTO: 1.1 %
ERYTHROCYTE [DISTWIDTH] IN BLOOD BY AUTOMATED COUNT: 13.3 % (ref 11–15)
GLOBULIN SER CALC-MCNC: 2.2 G/DL (CALC) (ref 1.9–3.7)
GLUCOSE SERPL-MCNC: 87 MG/DL (ref 65–99)
HCT VFR BLD AUTO: 46.2 % (ref 38.5–50)
HDLC SERPL-MCNC: 55 MG/DL
HGB BLD-MCNC: 15.1 G/DL (ref 13.2–17.1)
LDLC SERPL CALC-MCNC: 80 MG/DL (CALC)
LYMPHOCYTES # BLD AUTO: 1901 CELLS/UL (ref 850–3900)
LYMPHOCYTES NFR BLD AUTO: 29.7 %
MCH RBC QN AUTO: 30.6 PG (ref 27–33)
MCHC RBC AUTO-ENTMCNC: 32.7 G/DL (ref 32–36)
MCV RBC AUTO: 93.7 FL (ref 80–100)
MONOCYTES # BLD AUTO: 627 CELLS/UL (ref 200–950)
MONOCYTES NFR BLD AUTO: 9.8 %
NEUTROPHILS # BLD AUTO: 3789 CELLS/UL (ref 1500–7800)
NEUTROPHILS NFR BLD AUTO: 59.2 %
NONHDLC SERPL-MCNC: 101 MG/DL (CALC)
PLATELET # BLD AUTO: 155 THOUSAND/UL (ref 140–400)
PMV BLD REES-ECKER: 11 FL (ref 7.5–12.5)
POTASSIUM SERPL-SCNC: 4.6 MMOL/L (ref 3.5–5.3)
PROT SERPL-MCNC: 6.6 G/DL (ref 6.1–8.1)
RBC # BLD AUTO: 4.93 MILLION/UL (ref 4.2–5.8)
SL AMB EGFR AFRICAN AMERICAN: 96 ML/MIN/1.73M2
SL AMB EGFR NON AFRICAN AMERICAN: 82 ML/MIN/1.73M2
SODIUM SERPL-SCNC: 140 MMOL/L (ref 135–146)
TRIGL SERPL-MCNC: 118 MG/DL
TSH SERPL-ACNC: 1.61 MIU/L (ref 0.4–4.5)
WBC # BLD AUTO: 6.4 THOUSAND/UL (ref 3.8–10.8)

## 2021-07-28 ENCOUNTER — HOSPITAL ENCOUNTER (OUTPATIENT)
Dept: NON INVASIVE DIAGNOSTICS | Facility: CLINIC | Age: 76
Discharge: HOME | End: 2021-07-28
Payer: COMMERCIAL

## 2021-07-28 DIAGNOSIS — I65.22 CAROTID STENOSIS, LEFT: ICD-10-CM

## 2021-07-28 DIAGNOSIS — I65.21 RIGHT INTERNAL CAROTID OCCLUSION: ICD-10-CM

## 2021-07-28 PROCEDURE — 93880 EXTRACRANIAL BILAT STUDY: CPT

## 2021-07-28 PROCEDURE — 93880 EXTRACRANIAL BILAT STUDY: CPT | Performed by: SURGERY

## 2021-10-02 ENCOUNTER — HOSPITAL ENCOUNTER (EMERGENCY)
Facility: HOSPITAL | Age: 76
DRG: 064 | End: 2021-10-02
Attending: EMERGENCY MEDICINE
Payer: COMMERCIAL

## 2021-10-02 ENCOUNTER — APPOINTMENT (EMERGENCY)
Dept: CT IMAGING | Facility: HOSPITAL | Age: 76
DRG: 064 | End: 2021-10-02
Payer: COMMERCIAL

## 2021-10-02 ENCOUNTER — HOSPITAL ENCOUNTER (INPATIENT)
Facility: HOSPITAL | Age: 76
LOS: 6 days | DRG: 064 | End: 2021-10-08
Attending: ANESTHESIOLOGY | Admitting: ANESTHESIOLOGY
Payer: COMMERCIAL

## 2021-10-02 VITALS
BODY MASS INDEX: 30.6 KG/M2 | RESPIRATION RATE: 18 BRPM | TEMPERATURE: 98 F | DIASTOLIC BLOOD PRESSURE: 57 MMHG | SYSTOLIC BLOOD PRESSURE: 124 MMHG | HEART RATE: 82 BPM | WEIGHT: 207.23 LBS | OXYGEN SATURATION: 96 %

## 2021-10-02 DIAGNOSIS — I10 HYPERTENSION: ICD-10-CM

## 2021-10-02 DIAGNOSIS — R73.03 PREDIABETES: ICD-10-CM

## 2021-10-02 DIAGNOSIS — I63.81 LEFT SIDED LACUNAR INFARCTION (HCC): ICD-10-CM

## 2021-10-02 DIAGNOSIS — E78.5 HYPERLIPIDEMIA: ICD-10-CM

## 2021-10-02 DIAGNOSIS — I16.1 HYPERTENSIVE EMERGENCY: ICD-10-CM

## 2021-10-02 DIAGNOSIS — I61.9 HEMORRHAGIC STROKE (HCC): Primary | ICD-10-CM

## 2021-10-02 DIAGNOSIS — I62.9 INTRACRANIAL HEMORRHAGE (HCC): Primary | ICD-10-CM

## 2021-10-02 LAB
ALBUMIN SERPL BCP-MCNC: 3.5 G/DL (ref 3.5–5)
ALP SERPL-CCNC: 81 U/L (ref 46–116)
ALT SERPL W P-5'-P-CCNC: 35 U/L (ref 12–78)
ANION GAP SERPL CALCULATED.3IONS-SCNC: 4 MMOL/L (ref 4–13)
ANION GAP SERPL CALCULATED.3IONS-SCNC: 8 MMOL/L (ref 4–13)
APTT PPP: 34 SECONDS (ref 23–37)
AST SERPL W P-5'-P-CCNC: 20 U/L (ref 5–45)
BASOPHILS # BLD AUTO: 0.01 THOUSANDS/ΜL (ref 0–0.1)
BASOPHILS NFR BLD AUTO: 0 % (ref 0–1)
BILIRUB SERPL-MCNC: 0.58 MG/DL (ref 0.2–1)
BUN SERPL-MCNC: 13 MG/DL (ref 5–25)
BUN SERPL-MCNC: 16 MG/DL (ref 5–25)
CA-I BLD-SCNC: 1.17 MMOL/L (ref 1.12–1.32)
CALCIUM SERPL-MCNC: 9.3 MG/DL (ref 8.3–10.1)
CALCIUM SERPL-MCNC: 9.4 MG/DL (ref 8.3–10.1)
CHLORIDE SERPL-SCNC: 107 MMOL/L (ref 100–108)
CHLORIDE SERPL-SCNC: 110 MMOL/L (ref 100–108)
CO2 SERPL-SCNC: 26 MMOL/L (ref 21–32)
CO2 SERPL-SCNC: 28 MMOL/L (ref 21–32)
CREAT SERPL-MCNC: 0.83 MG/DL (ref 0.6–1.3)
CREAT SERPL-MCNC: 0.97 MG/DL (ref 0.6–1.3)
EOSINOPHIL # BLD AUTO: 0.02 THOUSAND/ΜL (ref 0–0.61)
EOSINOPHIL NFR BLD AUTO: 0 % (ref 0–6)
ERYTHROCYTE [DISTWIDTH] IN BLOOD BY AUTOMATED COUNT: 12.4 % (ref 11.6–15.1)
ERYTHROCYTE [DISTWIDTH] IN BLOOD BY AUTOMATED COUNT: 12.6 % (ref 11.6–15.1)
GFR SERPL CREATININE-BSD FRML MDRD: 74 ML/MIN/1.73SQ M
GFR SERPL CREATININE-BSD FRML MDRD: 84 ML/MIN/1.73SQ M
GLUCOSE SERPL-MCNC: 104 MG/DL (ref 65–140)
GLUCOSE SERPL-MCNC: 114 MG/DL (ref 65–140)
GLUCOSE SERPL-MCNC: 92 MG/DL (ref 65–140)
HCT VFR BLD AUTO: 41.9 % (ref 36.5–49.3)
HCT VFR BLD AUTO: 45.1 % (ref 36.5–49.3)
HGB BLD-MCNC: 13.8 G/DL (ref 12–17)
HGB BLD-MCNC: 15 G/DL (ref 12–17)
IMM GRANULOCYTES # BLD AUTO: 0.02 THOUSAND/UL (ref 0–0.2)
IMM GRANULOCYTES NFR BLD AUTO: 0 % (ref 0–2)
INR PPP: 1.03 (ref 0.84–1.19)
LYMPHOCYTES # BLD AUTO: 1.28 THOUSANDS/ΜL (ref 0.6–4.47)
LYMPHOCYTES NFR BLD AUTO: 16 % (ref 14–44)
MAGNESIUM SERPL-MCNC: 2.4 MG/DL (ref 1.6–2.6)
MCH RBC QN AUTO: 30.6 PG (ref 26.8–34.3)
MCH RBC QN AUTO: 30.9 PG (ref 26.8–34.3)
MCHC RBC AUTO-ENTMCNC: 32.9 G/DL (ref 31.4–37.4)
MCHC RBC AUTO-ENTMCNC: 33.3 G/DL (ref 31.4–37.4)
MCV RBC AUTO: 93 FL (ref 82–98)
MCV RBC AUTO: 93 FL (ref 82–98)
MONOCYTES # BLD AUTO: 0.68 THOUSAND/ΜL (ref 0.17–1.22)
MONOCYTES NFR BLD AUTO: 9 % (ref 4–12)
NEUTROPHILS # BLD AUTO: 5.78 THOUSANDS/ΜL (ref 1.85–7.62)
NEUTS SEG NFR BLD AUTO: 75 % (ref 43–75)
NRBC BLD AUTO-RTO: 0 /100 WBCS
PHOSPHATE SERPL-MCNC: 3.2 MG/DL (ref 2.3–4.1)
PLATELET # BLD AUTO: 148 THOUSANDS/UL (ref 149–390)
PLATELET # BLD AUTO: 156 THOUSANDS/UL (ref 149–390)
PMV BLD AUTO: 10.5 FL (ref 8.9–12.7)
PMV BLD AUTO: 10.6 FL (ref 8.9–12.7)
POTASSIUM SERPL-SCNC: 4 MMOL/L (ref 3.5–5.3)
POTASSIUM SERPL-SCNC: 4.4 MMOL/L (ref 3.5–5.3)
PROT SERPL-MCNC: 6.6 G/DL (ref 6.4–8.2)
PROTHROMBIN TIME: 13.2 SECONDS (ref 11.6–14.5)
RBC # BLD AUTO: 4.51 MILLION/UL (ref 3.88–5.62)
RBC # BLD AUTO: 4.85 MILLION/UL (ref 3.88–5.62)
SODIUM SERPL-SCNC: 140 MMOL/L (ref 136–145)
SODIUM SERPL-SCNC: 143 MMOL/L (ref 136–145)
TROPONIN I SERPL-MCNC: <0.02 NG/ML
WBC # BLD AUTO: 5.46 THOUSAND/UL (ref 4.31–10.16)
WBC # BLD AUTO: 7.79 THOUSAND/UL (ref 4.31–10.16)

## 2021-10-02 PROCEDURE — 99291 CRITICAL CARE FIRST HOUR: CPT | Performed by: EMERGENCY MEDICINE

## 2021-10-02 PROCEDURE — 82330 ASSAY OF CALCIUM: CPT | Performed by: STUDENT IN AN ORGANIZED HEALTH CARE EDUCATION/TRAINING PROGRAM

## 2021-10-02 PROCEDURE — 36415 COLL VENOUS BLD VENIPUNCTURE: CPT | Performed by: EMERGENCY MEDICINE

## 2021-10-02 PROCEDURE — 96366 THER/PROPH/DIAG IV INF ADDON: CPT

## 2021-10-02 PROCEDURE — 82948 REAGENT STRIP/BLOOD GLUCOSE: CPT

## 2021-10-02 PROCEDURE — 93005 ELECTROCARDIOGRAM TRACING: CPT

## 2021-10-02 PROCEDURE — 80048 BASIC METABOLIC PNL TOTAL CA: CPT | Performed by: EMERGENCY MEDICINE

## 2021-10-02 PROCEDURE — 84484 ASSAY OF TROPONIN QUANT: CPT | Performed by: EMERGENCY MEDICINE

## 2021-10-02 PROCEDURE — 85730 THROMBOPLASTIN TIME PARTIAL: CPT | Performed by: EMERGENCY MEDICINE

## 2021-10-02 PROCEDURE — 83735 ASSAY OF MAGNESIUM: CPT | Performed by: STUDENT IN AN ORGANIZED HEALTH CARE EDUCATION/TRAINING PROGRAM

## 2021-10-02 PROCEDURE — 70498 CT ANGIOGRAPHY NECK: CPT

## 2021-10-02 PROCEDURE — 85610 PROTHROMBIN TIME: CPT | Performed by: EMERGENCY MEDICINE

## 2021-10-02 PROCEDURE — 85025 COMPLETE CBC W/AUTO DIFF WBC: CPT | Performed by: STUDENT IN AN ORGANIZED HEALTH CARE EDUCATION/TRAINING PROGRAM

## 2021-10-02 PROCEDURE — 70496 CT ANGIOGRAPHY HEAD: CPT

## 2021-10-02 PROCEDURE — 99291 CRITICAL CARE FIRST HOUR: CPT

## 2021-10-02 PROCEDURE — 85027 COMPLETE CBC AUTOMATED: CPT | Performed by: EMERGENCY MEDICINE

## 2021-10-02 PROCEDURE — 99223 1ST HOSP IP/OBS HIGH 75: CPT | Performed by: ANESTHESIOLOGY

## 2021-10-02 PROCEDURE — 84100 ASSAY OF PHOSPHORUS: CPT | Performed by: STUDENT IN AN ORGANIZED HEALTH CARE EDUCATION/TRAINING PROGRAM

## 2021-10-02 PROCEDURE — 96365 THER/PROPH/DIAG IV INF INIT: CPT

## 2021-10-02 PROCEDURE — 99223 1ST HOSP IP/OBS HIGH 75: CPT | Performed by: PSYCHIATRY & NEUROLOGY

## 2021-10-02 PROCEDURE — 80053 COMPREHEN METABOLIC PANEL: CPT | Performed by: STUDENT IN AN ORGANIZED HEALTH CARE EDUCATION/TRAINING PROGRAM

## 2021-10-02 RX ORDER — POLYETHYLENE GLYCOL 3350 17 G/17G
17 POWDER, FOR SOLUTION ORAL DAILY PRN
Status: DISCONTINUED | OUTPATIENT
Start: 2021-10-02 | End: 2021-10-07

## 2021-10-02 RX ORDER — ATORVASTATIN CALCIUM 40 MG/1
40 TABLET, FILM COATED ORAL
Status: DISCONTINUED | OUTPATIENT
Start: 2021-10-02 | End: 2021-10-08 | Stop reason: HOSPADM

## 2021-10-02 RX ORDER — LISINOPRIL 5 MG/1
5 TABLET ORAL DAILY
Status: DISCONTINUED | OUTPATIENT
Start: 2021-10-02 | End: 2021-10-04

## 2021-10-02 RX ORDER — ACETAMINOPHEN 325 MG/1
650 TABLET ORAL EVERY 4 HOURS PRN
Status: DISCONTINUED | OUTPATIENT
Start: 2021-10-02 | End: 2021-10-08 | Stop reason: HOSPADM

## 2021-10-02 RX ADMIN — SODIUM CHLORIDE 10 MG/HR: 0.9 INJECTION, SOLUTION INTRAVENOUS at 11:03

## 2021-10-02 RX ADMIN — NICARDIPINE HYDROCHLORIDE 5 MG/HR: 2.5 INJECTION INTRAVENOUS at 16:05

## 2021-10-02 RX ADMIN — DESMOPRESSIN ACETATE 28.4 MCG: 4 SOLUTION INTRAVENOUS at 11:03

## 2021-10-02 RX ADMIN — LISINOPRIL 5 MG: 5 TABLET ORAL at 17:57

## 2021-10-02 RX ADMIN — IOHEXOL 85 ML: 350 INJECTION, SOLUTION INTRAVENOUS at 10:28

## 2021-10-03 ENCOUNTER — APPOINTMENT (INPATIENT)
Dept: NON INVASIVE DIAGNOSTICS | Facility: HOSPITAL | Age: 76
DRG: 064 | End: 2021-10-03
Payer: COMMERCIAL

## 2021-10-03 ENCOUNTER — APPOINTMENT (INPATIENT)
Dept: RADIOLOGY | Facility: HOSPITAL | Age: 76
DRG: 064 | End: 2021-10-03
Payer: COMMERCIAL

## 2021-10-03 LAB
ALBUMIN SERPL BCP-MCNC: 3.3 G/DL (ref 3.5–5)
ALP SERPL-CCNC: 69 U/L (ref 46–116)
ALT SERPL W P-5'-P-CCNC: 30 U/L (ref 12–78)
ANION GAP SERPL CALCULATED.3IONS-SCNC: 3 MMOL/L (ref 4–13)
AST SERPL W P-5'-P-CCNC: 20 U/L (ref 5–45)
ATRIAL RATE: 63 BPM
BASOPHILS # BLD AUTO: 0.01 THOUSANDS/ΜL (ref 0–0.1)
BASOPHILS NFR BLD AUTO: 0 % (ref 0–1)
BILIRUB SERPL-MCNC: 0.88 MG/DL (ref 0.2–1)
BUN SERPL-MCNC: 13 MG/DL (ref 5–25)
CALCIUM ALBUM COR SERPL-MCNC: 9.6 MG/DL (ref 8.3–10.1)
CALCIUM SERPL-MCNC: 9 MG/DL (ref 8.3–10.1)
CHLORIDE SERPL-SCNC: 109 MMOL/L (ref 100–108)
CHOLEST SERPL-MCNC: 129 MG/DL (ref 50–200)
CO2 SERPL-SCNC: 26 MMOL/L (ref 21–32)
CREAT SERPL-MCNC: 0.76 MG/DL (ref 0.6–1.3)
EOSINOPHIL # BLD AUTO: 0.05 THOUSAND/ΜL (ref 0–0.61)
EOSINOPHIL NFR BLD AUTO: 1 % (ref 0–6)
ERYTHROCYTE [DISTWIDTH] IN BLOOD BY AUTOMATED COUNT: 12.9 % (ref 11.6–15.1)
EST. AVERAGE GLUCOSE BLD GHB EST-MCNC: 120 MG/DL
GFR SERPL CREATININE-BSD FRML MDRD: 87 ML/MIN/1.73SQ M
GLUCOSE SERPL-MCNC: 98 MG/DL (ref 65–140)
HBA1C MFR BLD: 5.8 %
HCT VFR BLD AUTO: 40.2 % (ref 36.5–49.3)
HDLC SERPL-MCNC: 53 MG/DL
HGB BLD-MCNC: 12.9 G/DL (ref 12–17)
IMM GRANULOCYTES # BLD AUTO: 0.02 THOUSAND/UL (ref 0–0.2)
IMM GRANULOCYTES NFR BLD AUTO: 0 % (ref 0–2)
LDLC SERPL CALC-MCNC: 57 MG/DL (ref 0–100)
LYMPHOCYTES # BLD AUTO: 1.47 THOUSANDS/ΜL (ref 0.6–4.47)
LYMPHOCYTES NFR BLD AUTO: 22 % (ref 14–44)
MAGNESIUM SERPL-MCNC: 2.5 MG/DL (ref 1.6–2.6)
MCH RBC QN AUTO: 30.3 PG (ref 26.8–34.3)
MCHC RBC AUTO-ENTMCNC: 32.1 G/DL (ref 31.4–37.4)
MCV RBC AUTO: 94 FL (ref 82–98)
MONOCYTES # BLD AUTO: 0.71 THOUSAND/ΜL (ref 0.17–1.22)
MONOCYTES NFR BLD AUTO: 11 % (ref 4–12)
NEUTROPHILS # BLD AUTO: 4.31 THOUSANDS/ΜL (ref 1.85–7.62)
NEUTS SEG NFR BLD AUTO: 66 % (ref 43–75)
NRBC BLD AUTO-RTO: 0 /100 WBCS
P AXIS: 50 DEGREES
PHOSPHATE SERPL-MCNC: 2.7 MG/DL (ref 2.3–4.1)
PLATELET # BLD AUTO: 142 THOUSANDS/UL (ref 149–390)
PMV BLD AUTO: 10.6 FL (ref 8.9–12.7)
POTASSIUM SERPL-SCNC: 4.1 MMOL/L (ref 3.5–5.3)
PR INTERVAL: 188 MS
PROT SERPL-MCNC: 6.3 G/DL (ref 6.4–8.2)
QRS AXIS: 36 DEGREES
QRSD INTERVAL: 94 MS
QT INTERVAL: 404 MS
QTC INTERVAL: 413 MS
RBC # BLD AUTO: 4.26 MILLION/UL (ref 3.88–5.62)
SODIUM SERPL-SCNC: 138 MMOL/L (ref 136–145)
T WAVE AXIS: -16 DEGREES
TRIGL SERPL-MCNC: 95 MG/DL
VENTRICULAR RATE: 63 BPM
WBC # BLD AUTO: 6.57 THOUSAND/UL (ref 4.31–10.16)

## 2021-10-03 PROCEDURE — 80053 COMPREHEN METABOLIC PANEL: CPT | Performed by: STUDENT IN AN ORGANIZED HEALTH CARE EDUCATION/TRAINING PROGRAM

## 2021-10-03 PROCEDURE — 85025 COMPLETE CBC W/AUTO DIFF WBC: CPT | Performed by: STUDENT IN AN ORGANIZED HEALTH CARE EDUCATION/TRAINING PROGRAM

## 2021-10-03 PROCEDURE — 80061 LIPID PANEL: CPT | Performed by: STUDENT IN AN ORGANIZED HEALTH CARE EDUCATION/TRAINING PROGRAM

## 2021-10-03 PROCEDURE — 93306 TTE W/DOPPLER COMPLETE: CPT | Performed by: INTERNAL MEDICINE

## 2021-10-03 PROCEDURE — 93010 ELECTROCARDIOGRAM REPORT: CPT | Performed by: INTERNAL MEDICINE

## 2021-10-03 PROCEDURE — 99233 SBSQ HOSP IP/OBS HIGH 50: CPT | Performed by: ANESTHESIOLOGY

## 2021-10-03 PROCEDURE — 99232 SBSQ HOSP IP/OBS MODERATE 35: CPT | Performed by: PSYCHIATRY & NEUROLOGY

## 2021-10-03 PROCEDURE — 93306 TTE W/DOPPLER COMPLETE: CPT

## 2021-10-03 PROCEDURE — 70450 CT HEAD/BRAIN W/O DYE: CPT

## 2021-10-03 PROCEDURE — 83036 HEMOGLOBIN GLYCOSYLATED A1C: CPT | Performed by: STUDENT IN AN ORGANIZED HEALTH CARE EDUCATION/TRAINING PROGRAM

## 2021-10-03 PROCEDURE — G1004 CDSM NDSC: HCPCS

## 2021-10-03 PROCEDURE — 83735 ASSAY OF MAGNESIUM: CPT | Performed by: STUDENT IN AN ORGANIZED HEALTH CARE EDUCATION/TRAINING PROGRAM

## 2021-10-03 PROCEDURE — 84100 ASSAY OF PHOSPHORUS: CPT | Performed by: STUDENT IN AN ORGANIZED HEALTH CARE EDUCATION/TRAINING PROGRAM

## 2021-10-03 PROCEDURE — 92610 EVALUATE SWALLOWING FUNCTION: CPT

## 2021-10-03 RX ORDER — HYDRALAZINE HYDROCHLORIDE 20 MG/ML
10 INJECTION INTRAMUSCULAR; INTRAVENOUS EVERY 6 HOURS PRN
Status: DISCONTINUED | OUTPATIENT
Start: 2021-10-03 | End: 2021-10-04

## 2021-10-03 RX ORDER — SODIUM CHLORIDE, SODIUM GLUCONATE, SODIUM ACETATE, POTASSIUM CHLORIDE, MAGNESIUM CHLORIDE, SODIUM PHOSPHATE, DIBASIC, AND POTASSIUM PHOSPHATE .53; .5; .37; .037; .03; .012; .00082 G/100ML; G/100ML; G/100ML; G/100ML; G/100ML; G/100ML; G/100ML
500 INJECTION, SOLUTION INTRAVENOUS ONCE
Status: COMPLETED | OUTPATIENT
Start: 2021-10-03 | End: 2021-10-03

## 2021-10-03 RX ORDER — POLYETHYLENE GLYCOL 3350 17 G/17G
17 POWDER, FOR SOLUTION ORAL DAILY PRN
Status: DISCONTINUED | OUTPATIENT
Start: 2021-10-03 | End: 2021-10-03

## 2021-10-03 RX ADMIN — HYDRALAZINE HYDROCHLORIDE 10 MG: 20 INJECTION, SOLUTION INTRAMUSCULAR; INTRAVENOUS at 16:47

## 2021-10-03 RX ADMIN — LISINOPRIL 5 MG: 5 TABLET ORAL at 11:47

## 2021-10-03 RX ADMIN — ATORVASTATIN CALCIUM 40 MG: 40 TABLET, FILM COATED ORAL at 18:27

## 2021-10-03 RX ADMIN — NICARDIPINE HYDROCHLORIDE 10 MG/HR: 2.5 INJECTION INTRAVENOUS at 19:44

## 2021-10-03 RX ADMIN — SODIUM CHLORIDE, SODIUM GLUCONATE, SODIUM ACETATE, POTASSIUM CHLORIDE, MAGNESIUM CHLORIDE, SODIUM PHOSPHATE, DIBASIC, AND POTASSIUM PHOSPHATE 500 ML: .53; .5; .37; .037; .03; .012; .00082 INJECTION, SOLUTION INTRAVENOUS at 11:47

## 2021-10-03 RX ADMIN — POTASSIUM & SODIUM PHOSPHATES POWDER PACK 280-160-250 MG 1 PACKET: 280-160-250 PACK at 10:38

## 2021-10-04 ENCOUNTER — APPOINTMENT (INPATIENT)
Dept: RADIOLOGY | Facility: HOSPITAL | Age: 76
DRG: 064 | End: 2021-10-04
Payer: COMMERCIAL

## 2021-10-04 LAB
ANION GAP SERPL CALCULATED.3IONS-SCNC: 7 MMOL/L (ref 4–13)
ATRIAL RATE: 105 BPM
ATRIAL RATE: 60 BPM
ATRIAL RATE: 67 BPM
ATRIAL RATE: 84 BPM
BASOPHILS # BLD AUTO: 0.01 THOUSANDS/ΜL (ref 0–0.1)
BASOPHILS NFR BLD AUTO: 0 % (ref 0–1)
BUN SERPL-MCNC: 11 MG/DL (ref 5–25)
CA-I BLD-SCNC: 1.11 MMOL/L (ref 1.12–1.32)
CALCIUM SERPL-MCNC: 8.8 MG/DL (ref 8.3–10.1)
CHLORIDE SERPL-SCNC: 106 MMOL/L (ref 100–108)
CO2 SERPL-SCNC: 22 MMOL/L (ref 21–32)
CREAT SERPL-MCNC: 0.66 MG/DL (ref 0.6–1.3)
EOSINOPHIL # BLD AUTO: 0.03 THOUSAND/ΜL (ref 0–0.61)
EOSINOPHIL NFR BLD AUTO: 0 % (ref 0–6)
ERYTHROCYTE [DISTWIDTH] IN BLOOD BY AUTOMATED COUNT: 12.5 % (ref 11.6–15.1)
GFR SERPL CREATININE-BSD FRML MDRD: 93 ML/MIN/1.73SQ M
GLUCOSE SERPL-MCNC: 102 MG/DL (ref 65–140)
HCT VFR BLD AUTO: 42.9 % (ref 36.5–49.3)
HGB BLD-MCNC: 14.5 G/DL (ref 12–17)
IMM GRANULOCYTES # BLD AUTO: 0.02 THOUSAND/UL (ref 0–0.2)
IMM GRANULOCYTES NFR BLD AUTO: 0 % (ref 0–2)
LYMPHOCYTES # BLD AUTO: 1.61 THOUSANDS/ΜL (ref 0.6–4.47)
LYMPHOCYTES NFR BLD AUTO: 19 % (ref 14–44)
MAGNESIUM SERPL-MCNC: 2.3 MG/DL (ref 1.6–2.6)
MCH RBC QN AUTO: 30.9 PG (ref 26.8–34.3)
MCHC RBC AUTO-ENTMCNC: 33.8 G/DL (ref 31.4–37.4)
MCV RBC AUTO: 91 FL (ref 82–98)
MONOCYTES # BLD AUTO: 0.8 THOUSAND/ΜL (ref 0.17–1.22)
MONOCYTES NFR BLD AUTO: 9 % (ref 4–12)
NEUTROPHILS # BLD AUTO: 6.23 THOUSANDS/ΜL (ref 1.85–7.62)
NEUTS SEG NFR BLD AUTO: 72 % (ref 43–75)
NRBC BLD AUTO-RTO: 0 /100 WBCS
P AXIS: -53 DEGREES
P AXIS: 27 DEGREES
P AXIS: 63 DEGREES
P AXIS: 73 DEGREES
PHOSPHATE SERPL-MCNC: 2.5 MG/DL (ref 2.3–4.1)
PLATELET # BLD AUTO: 164 THOUSANDS/UL (ref 149–390)
PMV BLD AUTO: 10.3 FL (ref 8.9–12.7)
POTASSIUM SERPL-SCNC: 3.8 MMOL/L (ref 3.5–5.3)
PR INTERVAL: 129 MS
PR INTERVAL: 163 MS
PR INTERVAL: 167 MS
PR INTERVAL: 179 MS
QRS AXIS: 48 DEGREES
QRS AXIS: 57 DEGREES
QRS AXIS: 73 DEGREES
QRS AXIS: 82 DEGREES
QRSD INTERVAL: 100 MS
QRSD INTERVAL: 96 MS
QT INTERVAL: 346 MS
QT INTERVAL: 379 MS
QT INTERVAL: 413 MS
QT INTERVAL: 417 MS
QTC INTERVAL: 417 MS
QTC INTERVAL: 436 MS
QTC INTERVAL: 448 MS
QTC INTERVAL: 458 MS
RBC # BLD AUTO: 4.7 MILLION/UL (ref 3.88–5.62)
SODIUM SERPL-SCNC: 135 MMOL/L (ref 136–145)
T WAVE AXIS: 22 DEGREES
T WAVE AXIS: 22 DEGREES
T WAVE AXIS: 27 DEGREES
T WAVE AXIS: 9 DEGREES
VENTRICULAR RATE: 105 BPM
VENTRICULAR RATE: 60 BPM
VENTRICULAR RATE: 67 BPM
VENTRICULAR RATE: 84 BPM
WBC # BLD AUTO: 8.7 THOUSAND/UL (ref 4.31–10.16)

## 2021-10-04 PROCEDURE — 99232 SBSQ HOSP IP/OBS MODERATE 35: CPT | Performed by: PSYCHIATRY & NEUROLOGY

## 2021-10-04 PROCEDURE — 93005 ELECTROCARDIOGRAM TRACING: CPT

## 2021-10-04 PROCEDURE — 83735 ASSAY OF MAGNESIUM: CPT | Performed by: STUDENT IN AN ORGANIZED HEALTH CARE EDUCATION/TRAINING PROGRAM

## 2021-10-04 PROCEDURE — 84100 ASSAY OF PHOSPHORUS: CPT | Performed by: STUDENT IN AN ORGANIZED HEALTH CARE EDUCATION/TRAINING PROGRAM

## 2021-10-04 PROCEDURE — 82330 ASSAY OF CALCIUM: CPT | Performed by: STUDENT IN AN ORGANIZED HEALTH CARE EDUCATION/TRAINING PROGRAM

## 2021-10-04 PROCEDURE — 99233 SBSQ HOSP IP/OBS HIGH 50: CPT | Performed by: EMERGENCY MEDICINE

## 2021-10-04 PROCEDURE — 70450 CT HEAD/BRAIN W/O DYE: CPT

## 2021-10-04 PROCEDURE — 92526 ORAL FUNCTION THERAPY: CPT

## 2021-10-04 PROCEDURE — 99222 1ST HOSP IP/OBS MODERATE 55: CPT

## 2021-10-04 PROCEDURE — 80048 BASIC METABOLIC PNL TOTAL CA: CPT | Performed by: STUDENT IN AN ORGANIZED HEALTH CARE EDUCATION/TRAINING PROGRAM

## 2021-10-04 PROCEDURE — 97167 OT EVAL HIGH COMPLEX 60 MIN: CPT

## 2021-10-04 PROCEDURE — 93010 ELECTROCARDIOGRAM REPORT: CPT | Performed by: INTERNAL MEDICINE

## 2021-10-04 PROCEDURE — G1004 CDSM NDSC: HCPCS

## 2021-10-04 PROCEDURE — 92523 SPEECH SOUND LANG COMPREHEN: CPT

## 2021-10-04 PROCEDURE — 85025 COMPLETE CBC W/AUTO DIFF WBC: CPT | Performed by: STUDENT IN AN ORGANIZED HEALTH CARE EDUCATION/TRAINING PROGRAM

## 2021-10-04 PROCEDURE — 97163 PT EVAL HIGH COMPLEX 45 MIN: CPT

## 2021-10-04 RX ORDER — AMLODIPINE BESYLATE 10 MG/1
10 TABLET ORAL DAILY
Status: DISCONTINUED | OUTPATIENT
Start: 2021-10-04 | End: 2021-10-08 | Stop reason: HOSPADM

## 2021-10-04 RX ORDER — FENTANYL CITRATE 50 UG/ML
50 INJECTION, SOLUTION INTRAMUSCULAR; INTRAVENOUS EVERY 2 HOUR PRN
Status: DISCONTINUED | OUTPATIENT
Start: 2021-10-04 | End: 2021-10-04

## 2021-10-04 RX ORDER — CALCIUM GLUCONATE 20 MG/ML
1 INJECTION, SOLUTION INTRAVENOUS ONCE
Status: COMPLETED | OUTPATIENT
Start: 2021-10-04 | End: 2021-10-04

## 2021-10-04 RX ORDER — SENNOSIDES 8.6 MG
1 TABLET ORAL
Status: DISCONTINUED | OUTPATIENT
Start: 2021-10-04 | End: 2021-10-07

## 2021-10-04 RX ORDER — LABETALOL 20 MG/4 ML (5 MG/ML) INTRAVENOUS SYRINGE
10 EVERY 6 HOURS PRN
Status: DISCONTINUED | OUTPATIENT
Start: 2021-10-04 | End: 2021-10-08 | Stop reason: HOSPADM

## 2021-10-04 RX ORDER — LISINOPRIL 10 MG/1
10 TABLET ORAL DAILY
Status: DISCONTINUED | OUTPATIENT
Start: 2021-10-05 | End: 2021-10-08 | Stop reason: HOSPADM

## 2021-10-04 RX ORDER — LISINOPRIL 5 MG/1
5 TABLET ORAL ONCE
Status: COMPLETED | OUTPATIENT
Start: 2021-10-04 | End: 2021-10-04

## 2021-10-04 RX ORDER — HYDRALAZINE HYDROCHLORIDE 20 MG/ML
10 INJECTION INTRAMUSCULAR; INTRAVENOUS EVERY 6 HOURS PRN
Status: DISCONTINUED | OUTPATIENT
Start: 2021-10-04 | End: 2021-10-04

## 2021-10-04 RX ORDER — HYDRALAZINE HYDROCHLORIDE 20 MG/ML
10 INJECTION INTRAMUSCULAR; INTRAVENOUS EVERY 6 HOURS PRN
Status: DISCONTINUED | OUTPATIENT
Start: 2021-10-04 | End: 2021-10-08 | Stop reason: HOSPADM

## 2021-10-04 RX ORDER — MAGNESIUM SULFATE HEPTAHYDRATE 40 MG/ML
2 INJECTION, SOLUTION INTRAVENOUS ONCE
Status: COMPLETED | OUTPATIENT
Start: 2021-10-04 | End: 2021-10-04

## 2021-10-04 RX ORDER — FENTANYL CITRATE 50 UG/ML
100 INJECTION, SOLUTION INTRAMUSCULAR; INTRAVENOUS ONCE
Status: DISCONTINUED | OUTPATIENT
Start: 2021-10-04 | End: 2021-10-04

## 2021-10-04 RX ADMIN — AMLODIPINE BESYLATE 10 MG: 10 TABLET ORAL at 15:41

## 2021-10-04 RX ADMIN — NICARDIPINE HYDROCHLORIDE 4.5 MG/HR: 2.5 INJECTION INTRAVENOUS at 15:43

## 2021-10-04 RX ADMIN — POTASSIUM & SODIUM PHOSPHATES POWDER PACK 280-160-250 MG 2 PACKET: 280-160-250 PACK at 17:41

## 2021-10-04 RX ADMIN — LISINOPRIL 5 MG: 5 TABLET ORAL at 12:28

## 2021-10-04 RX ADMIN — ATORVASTATIN CALCIUM 40 MG: 40 TABLET, FILM COATED ORAL at 17:41

## 2021-10-04 RX ADMIN — POTASSIUM & SODIUM PHOSPHATES POWDER PACK 280-160-250 MG 2 PACKET: 280-160-250 PACK at 09:18

## 2021-10-04 RX ADMIN — NICARDIPINE HYDROCHLORIDE 6 MG/HR: 2.5 INJECTION INTRAVENOUS at 12:01

## 2021-10-04 RX ADMIN — LISINOPRIL 5 MG: 5 TABLET ORAL at 09:14

## 2021-10-04 RX ADMIN — CALCIUM GLUCONATE 1 G: 20 INJECTION, SOLUTION INTRAVENOUS at 09:12

## 2021-10-04 RX ADMIN — HYDRALAZINE HYDROCHLORIDE 10 MG: 20 INJECTION, SOLUTION INTRAMUSCULAR; INTRAVENOUS at 09:09

## 2021-10-04 RX ADMIN — MAGNESIUM SULFATE HEPTAHYDRATE 2 G: 40 INJECTION, SOLUTION INTRAVENOUS at 13:42

## 2021-10-04 RX ADMIN — HYDRALAZINE HYDROCHLORIDE 10 MG: 20 INJECTION, SOLUTION INTRAMUSCULAR; INTRAVENOUS at 02:16

## 2021-10-05 ENCOUNTER — APPOINTMENT (INPATIENT)
Dept: RADIOLOGY | Facility: HOSPITAL | Age: 76
DRG: 064 | End: 2021-10-05
Payer: COMMERCIAL

## 2021-10-05 PROBLEM — E78.5 HYPERLIPIDEMIA: Status: ACTIVE | Noted: 2021-10-05

## 2021-10-05 PROBLEM — R73.03 PREDIABETES: Status: ACTIVE | Noted: 2021-10-05

## 2021-10-05 PROBLEM — I51.89 DIASTOLIC DYSFUNCTION: Status: ACTIVE | Noted: 2021-10-05

## 2021-10-05 PROBLEM — I10 HYPERTENSION: Status: ACTIVE | Noted: 2021-10-05

## 2021-10-05 LAB
ANION GAP SERPL CALCULATED.3IONS-SCNC: 5 MMOL/L (ref 4–13)
BASOPHILS # BLD AUTO: 0.01 THOUSANDS/ΜL (ref 0–0.1)
BASOPHILS NFR BLD AUTO: 0 % (ref 0–1)
BUN SERPL-MCNC: 11 MG/DL (ref 5–25)
CA-I BLD-SCNC: 1.07 MMOL/L (ref 1.12–1.32)
CALCIUM SERPL-MCNC: 8.7 MG/DL (ref 8.3–10.1)
CHLORIDE SERPL-SCNC: 110 MMOL/L (ref 100–108)
CO2 SERPL-SCNC: 23 MMOL/L (ref 21–32)
CREAT SERPL-MCNC: 0.7 MG/DL (ref 0.6–1.3)
EOSINOPHIL # BLD AUTO: 0.04 THOUSAND/ΜL (ref 0–0.61)
EOSINOPHIL NFR BLD AUTO: 1 % (ref 0–6)
ERYTHROCYTE [DISTWIDTH] IN BLOOD BY AUTOMATED COUNT: 12.7 % (ref 11.6–15.1)
GFR SERPL CREATININE-BSD FRML MDRD: 90 ML/MIN/1.73SQ M
GLUCOSE SERPL-MCNC: 106 MG/DL (ref 65–140)
HCT VFR BLD AUTO: 47.4 % (ref 36.5–49.3)
HGB BLD-MCNC: 15.6 G/DL (ref 12–17)
IMM GRANULOCYTES # BLD AUTO: 0.01 THOUSAND/UL (ref 0–0.2)
IMM GRANULOCYTES NFR BLD AUTO: 0 % (ref 0–2)
LYMPHOCYTES # BLD AUTO: 1.47 THOUSANDS/ΜL (ref 0.6–4.47)
LYMPHOCYTES NFR BLD AUTO: 21 % (ref 14–44)
MAGNESIUM SERPL-MCNC: 2.5 MG/DL (ref 1.6–2.6)
MCH RBC QN AUTO: 30.3 PG (ref 26.8–34.3)
MCHC RBC AUTO-ENTMCNC: 32.9 G/DL (ref 31.4–37.4)
MCV RBC AUTO: 92 FL (ref 82–98)
MONOCYTES # BLD AUTO: 0.97 THOUSAND/ΜL (ref 0.17–1.22)
MONOCYTES NFR BLD AUTO: 14 % (ref 4–12)
NEUTROPHILS # BLD AUTO: 4.42 THOUSANDS/ΜL (ref 1.85–7.62)
NEUTS SEG NFR BLD AUTO: 64 % (ref 43–75)
NRBC BLD AUTO-RTO: 0 /100 WBCS
PHOSPHATE SERPL-MCNC: 3.2 MG/DL (ref 2.3–4.1)
PLATELET # BLD AUTO: 159 THOUSANDS/UL (ref 149–390)
PMV BLD AUTO: 10.8 FL (ref 8.9–12.7)
POTASSIUM SERPL-SCNC: 4.4 MMOL/L (ref 3.5–5.3)
RBC # BLD AUTO: 5.15 MILLION/UL (ref 3.88–5.62)
SODIUM SERPL-SCNC: 138 MMOL/L (ref 136–145)
WBC # BLD AUTO: 6.92 THOUSAND/UL (ref 4.31–10.16)

## 2021-10-05 PROCEDURE — 84100 ASSAY OF PHOSPHORUS: CPT | Performed by: STUDENT IN AN ORGANIZED HEALTH CARE EDUCATION/TRAINING PROGRAM

## 2021-10-05 PROCEDURE — 70450 CT HEAD/BRAIN W/O DYE: CPT

## 2021-10-05 PROCEDURE — 80048 BASIC METABOLIC PNL TOTAL CA: CPT | Performed by: STUDENT IN AN ORGANIZED HEALTH CARE EDUCATION/TRAINING PROGRAM

## 2021-10-05 PROCEDURE — 99233 SBSQ HOSP IP/OBS HIGH 50: CPT | Performed by: EMERGENCY MEDICINE

## 2021-10-05 PROCEDURE — 83735 ASSAY OF MAGNESIUM: CPT | Performed by: STUDENT IN AN ORGANIZED HEALTH CARE EDUCATION/TRAINING PROGRAM

## 2021-10-05 PROCEDURE — NC001 PR NO CHARGE: Performed by: EMERGENCY MEDICINE

## 2021-10-05 PROCEDURE — 99232 SBSQ HOSP IP/OBS MODERATE 35: CPT | Performed by: PSYCHIATRY & NEUROLOGY

## 2021-10-05 PROCEDURE — 82330 ASSAY OF CALCIUM: CPT | Performed by: STUDENT IN AN ORGANIZED HEALTH CARE EDUCATION/TRAINING PROGRAM

## 2021-10-05 PROCEDURE — 85025 COMPLETE CBC W/AUTO DIFF WBC: CPT | Performed by: STUDENT IN AN ORGANIZED HEALTH CARE EDUCATION/TRAINING PROGRAM

## 2021-10-05 PROCEDURE — 92526 ORAL FUNCTION THERAPY: CPT

## 2021-10-05 PROCEDURE — G1004 CDSM NDSC: HCPCS

## 2021-10-05 RX ADMIN — ENOXAPARIN SODIUM 40 MG: 40 INJECTION SUBCUTANEOUS at 13:04

## 2021-10-05 RX ADMIN — AMLODIPINE BESYLATE 10 MG: 10 TABLET ORAL at 08:07

## 2021-10-05 RX ADMIN — POLYETHYLENE GLYCOL 3350 17 G: 17 POWDER, FOR SOLUTION ORAL at 08:06

## 2021-10-05 RX ADMIN — SENNOSIDES 8.6 MG: 8.6 TABLET ORAL at 00:00

## 2021-10-05 RX ADMIN — ATORVASTATIN CALCIUM 40 MG: 40 TABLET, FILM COATED ORAL at 16:53

## 2021-10-05 RX ADMIN — LISINOPRIL 10 MG: 10 TABLET ORAL at 09:36

## 2021-10-05 RX ADMIN — SENNOSIDES 8.6 MG: 8.6 TABLET ORAL at 21:16

## 2021-10-06 ENCOUNTER — APPOINTMENT (INPATIENT)
Dept: RADIOLOGY | Facility: HOSPITAL | Age: 76
DRG: 064 | End: 2021-10-06
Payer: COMMERCIAL

## 2021-10-06 ENCOUNTER — APPOINTMENT (INPATIENT)
Dept: NON INVASIVE DIAGNOSTICS | Facility: HOSPITAL | Age: 76
DRG: 064 | End: 2021-10-06
Payer: COMMERCIAL

## 2021-10-06 LAB
ANION GAP SERPL CALCULATED.3IONS-SCNC: 5 MMOL/L (ref 4–13)
BUN SERPL-MCNC: 16 MG/DL (ref 5–25)
CA-I BLD-SCNC: 1.17 MMOL/L (ref 1.12–1.32)
CALCIUM SERPL-MCNC: 9.6 MG/DL (ref 8.3–10.1)
CHLORIDE SERPL-SCNC: 107 MMOL/L (ref 100–108)
CO2 SERPL-SCNC: 24 MMOL/L (ref 21–32)
CREAT SERPL-MCNC: 0.8 MG/DL (ref 0.6–1.3)
GFR SERPL CREATININE-BSD FRML MDRD: 86 ML/MIN/1.73SQ M
GLUCOSE SERPL-MCNC: 95 MG/DL (ref 65–140)
MAGNESIUM SERPL-MCNC: 2.5 MG/DL (ref 1.6–2.6)
PHOSPHATE SERPL-MCNC: 3.2 MG/DL (ref 2.3–4.1)
POTASSIUM SERPL-SCNC: 4.9 MMOL/L (ref 3.5–5.3)
SODIUM SERPL-SCNC: 136 MMOL/L (ref 136–145)

## 2021-10-06 PROCEDURE — 99222 1ST HOSP IP/OBS MODERATE 55: CPT | Performed by: INTERNAL MEDICINE

## 2021-10-06 PROCEDURE — 93880 EXTRACRANIAL BILAT STUDY: CPT

## 2021-10-06 PROCEDURE — 97110 THERAPEUTIC EXERCISES: CPT

## 2021-10-06 PROCEDURE — 83735 ASSAY OF MAGNESIUM: CPT | Performed by: STUDENT IN AN ORGANIZED HEALTH CARE EDUCATION/TRAINING PROGRAM

## 2021-10-06 PROCEDURE — 99232 SBSQ HOSP IP/OBS MODERATE 35: CPT | Performed by: PSYCHIATRY & NEUROLOGY

## 2021-10-06 PROCEDURE — 70551 MRI BRAIN STEM W/O DYE: CPT

## 2021-10-06 PROCEDURE — 82330 ASSAY OF CALCIUM: CPT | Performed by: STUDENT IN AN ORGANIZED HEALTH CARE EDUCATION/TRAINING PROGRAM

## 2021-10-06 PROCEDURE — 84100 ASSAY OF PHOSPHORUS: CPT | Performed by: STUDENT IN AN ORGANIZED HEALTH CARE EDUCATION/TRAINING PROGRAM

## 2021-10-06 PROCEDURE — 80048 BASIC METABOLIC PNL TOTAL CA: CPT | Performed by: STUDENT IN AN ORGANIZED HEALTH CARE EDUCATION/TRAINING PROGRAM

## 2021-10-06 PROCEDURE — G1004 CDSM NDSC: HCPCS

## 2021-10-06 PROCEDURE — 97112 NEUROMUSCULAR REEDUCATION: CPT

## 2021-10-06 PROCEDURE — 97535 SELF CARE MNGMENT TRAINING: CPT

## 2021-10-06 RX ADMIN — ENOXAPARIN SODIUM 40 MG: 40 INJECTION SUBCUTANEOUS at 08:21

## 2021-10-06 RX ADMIN — ATORVASTATIN CALCIUM 40 MG: 40 TABLET, FILM COATED ORAL at 16:49

## 2021-10-06 RX ADMIN — LISINOPRIL 10 MG: 10 TABLET ORAL at 08:21

## 2021-10-06 RX ADMIN — SENNOSIDES 8.6 MG: 8.6 TABLET ORAL at 20:46

## 2021-10-06 RX ADMIN — AMLODIPINE BESYLATE 10 MG: 10 TABLET ORAL at 08:21

## 2021-10-07 ENCOUNTER — TELEPHONE (OUTPATIENT)
Dept: NEUROLOGY | Facility: CLINIC | Age: 76
End: 2021-10-07

## 2021-10-07 PROCEDURE — 99232 SBSQ HOSP IP/OBS MODERATE 35: CPT | Performed by: PSYCHIATRY & NEUROLOGY

## 2021-10-07 PROCEDURE — 93880 EXTRACRANIAL BILAT STUDY: CPT | Performed by: SURGERY

## 2021-10-07 PROCEDURE — 99233 SBSQ HOSP IP/OBS HIGH 50: CPT | Performed by: INTERNAL MEDICINE

## 2021-10-07 RX ORDER — POLYETHYLENE GLYCOL 3350 17 G/17G
17 POWDER, FOR SOLUTION ORAL DAILY
Status: DISCONTINUED | OUTPATIENT
Start: 2021-10-07 | End: 2021-10-08 | Stop reason: HOSPADM

## 2021-10-07 RX ORDER — AMOXICILLIN 250 MG
1 CAPSULE ORAL 2 TIMES DAILY
Status: DISCONTINUED | OUTPATIENT
Start: 2021-10-07 | End: 2021-10-08 | Stop reason: HOSPADM

## 2021-10-07 RX ADMIN — POLYETHYLENE GLYCOL 3350 17 G: 17 POWDER, FOR SOLUTION ORAL at 09:43

## 2021-10-07 RX ADMIN — AMLODIPINE BESYLATE 10 MG: 10 TABLET ORAL at 09:03

## 2021-10-07 RX ADMIN — LISINOPRIL 10 MG: 10 TABLET ORAL at 09:03

## 2021-10-07 RX ADMIN — ENOXAPARIN SODIUM 40 MG: 40 INJECTION SUBCUTANEOUS at 09:03

## 2021-10-07 RX ADMIN — HYDRALAZINE HYDROCHLORIDE 10 MG: 20 INJECTION, SOLUTION INTRAMUSCULAR; INTRAVENOUS at 13:16

## 2021-10-07 RX ADMIN — DOCUSATE SODIUM AND SENNOSIDES 1 TABLET: 8.6; 5 TABLET ORAL at 09:42

## 2021-10-07 RX ADMIN — ATORVASTATIN CALCIUM 40 MG: 40 TABLET, FILM COATED ORAL at 17:17

## 2021-10-07 RX ADMIN — DOCUSATE SODIUM AND SENNOSIDES 1 TABLET: 8.6; 5 TABLET ORAL at 17:17

## 2021-10-08 VITALS
BODY MASS INDEX: 28.85 KG/M2 | TEMPERATURE: 99.1 F | HEIGHT: 70 IN | OXYGEN SATURATION: 93 % | HEART RATE: 82 BPM | DIASTOLIC BLOOD PRESSURE: 77 MMHG | RESPIRATION RATE: 17 BRPM | WEIGHT: 201.5 LBS | SYSTOLIC BLOOD PRESSURE: 136 MMHG

## 2021-10-08 LAB — SARS-COV-2 RNA RESP QL NAA+PROBE: NEGATIVE

## 2021-10-08 PROCEDURE — U0005 INFEC AGEN DETEC AMPLI PROBE: HCPCS | Performed by: PSYCHIATRY & NEUROLOGY

## 2021-10-08 PROCEDURE — U0003 INFECTIOUS AGENT DETECTION BY NUCLEIC ACID (DNA OR RNA); SEVERE ACUTE RESPIRATORY SYNDROME CORONAVIRUS 2 (SARS-COV-2) (CORONAVIRUS DISEASE [COVID-19]), AMPLIFIED PROBE TECHNIQUE, MAKING USE OF HIGH THROUGHPUT TECHNOLOGIES AS DESCRIBED BY CMS-2020-01-R: HCPCS | Performed by: PSYCHIATRY & NEUROLOGY

## 2021-10-08 PROCEDURE — 99233 SBSQ HOSP IP/OBS HIGH 50: CPT | Performed by: INTERNAL MEDICINE

## 2021-10-08 PROCEDURE — 99239 HOSP IP/OBS DSCHRG MGMT >30: CPT | Performed by: PSYCHIATRY & NEUROLOGY

## 2021-10-08 RX ORDER — ATORVASTATIN CALCIUM 40 MG/1
40 TABLET, FILM COATED ORAL
Qty: 120 TABLET | Refills: 0 | Status: SHIPPED | OUTPATIENT
Start: 2021-10-08 | End: 2021-10-25 | Stop reason: SDUPTHER

## 2021-10-08 RX ORDER — LISINOPRIL 10 MG/1
10 TABLET ORAL DAILY
Qty: 30 TABLET | Refills: 0 | Status: SHIPPED | OUTPATIENT
Start: 2021-10-08 | End: 2021-10-25 | Stop reason: SDUPTHER

## 2021-10-08 RX ORDER — AMLODIPINE BESYLATE 10 MG/1
10 TABLET ORAL DAILY
Qty: 30 TABLET | Refills: 0 | Status: SHIPPED | OUTPATIENT
Start: 2021-10-08 | End: 2021-10-25 | Stop reason: SDUPTHER

## 2021-10-08 RX ADMIN — AMLODIPINE BESYLATE 10 MG: 10 TABLET ORAL at 08:49

## 2021-10-08 RX ADMIN — POLYETHYLENE GLYCOL 3350 17 G: 17 POWDER, FOR SOLUTION ORAL at 08:49

## 2021-10-08 RX ADMIN — ENOXAPARIN SODIUM 40 MG: 40 INJECTION SUBCUTANEOUS at 08:49

## 2021-10-08 RX ADMIN — LISINOPRIL 10 MG: 10 TABLET ORAL at 08:49

## 2021-10-08 RX ADMIN — DOCUSATE SODIUM AND SENNOSIDES 1 TABLET: 8.6; 5 TABLET ORAL at 08:49

## 2021-10-14 ENCOUNTER — TELEPHONE (OUTPATIENT)
Dept: NEUROLOGY | Facility: CLINIC | Age: 76
End: 2021-10-14

## 2021-10-15 DIAGNOSIS — I63.81 LEFT SIDED LACUNAR INFARCTION (HCC): ICD-10-CM

## 2021-10-15 DIAGNOSIS — I65.21 STENOSIS OF RIGHT CAROTID ARTERY: ICD-10-CM

## 2021-10-15 RX ORDER — CLOPIDOGREL BISULFATE 75 MG/1
TABLET ORAL
Qty: 90 TABLET | Refills: 1 | Status: SHIPPED | OUTPATIENT
Start: 2021-10-15 | End: 2022-01-07 | Stop reason: SDUPTHER

## 2021-10-18 ENCOUNTER — TRANSITIONAL CARE MANAGEMENT (OUTPATIENT)
Dept: FAMILY MEDICINE CLINIC | Facility: CLINIC | Age: 76
End: 2021-10-18

## 2021-10-19 ENCOUNTER — TELEPHONE (OUTPATIENT)
Dept: FAMILY MEDICINE CLINIC | Facility: CLINIC | Age: 76
End: 2021-10-19

## 2021-10-20 ENCOUNTER — TELEPHONE (OUTPATIENT)
Dept: NEUROLOGY | Facility: CLINIC | Age: 76
End: 2021-10-20

## 2021-10-20 ENCOUNTER — TELEPHONE (OUTPATIENT)
Dept: FAMILY MEDICINE CLINIC | Facility: CLINIC | Age: 76
End: 2021-10-20

## 2021-10-25 ENCOUNTER — OFFICE VISIT (OUTPATIENT)
Dept: FAMILY MEDICINE CLINIC | Facility: CLINIC | Age: 76
End: 2021-10-25
Payer: COMMERCIAL

## 2021-10-25 VITALS
BODY MASS INDEX: 26.84 KG/M2 | SYSTOLIC BLOOD PRESSURE: 120 MMHG | WEIGHT: 187.5 LBS | TEMPERATURE: 97.9 F | DIASTOLIC BLOOD PRESSURE: 58 MMHG | HEIGHT: 70 IN

## 2021-10-25 DIAGNOSIS — I63.81 LEFT SIDED LACUNAR INFARCTION (HCC): Primary | ICD-10-CM

## 2021-10-25 DIAGNOSIS — I10 HYPERTENSION: ICD-10-CM

## 2021-10-25 DIAGNOSIS — I61.9 HEMORRHAGIC STROKE (HCC): ICD-10-CM

## 2021-10-25 DIAGNOSIS — Z23 NEEDS FLU SHOT: ICD-10-CM

## 2021-10-25 DIAGNOSIS — E78.5 HYPERLIPIDEMIA: ICD-10-CM

## 2021-10-25 PROBLEM — E78.2 MIXED HYPERLIPIDEMIA: Status: ACTIVE | Noted: 2021-10-05

## 2021-10-25 PROCEDURE — G0008 ADMIN INFLUENZA VIRUS VAC: HCPCS | Performed by: FAMILY MEDICINE

## 2021-10-25 PROCEDURE — 90662 IIV NO PRSV INCREASED AG IM: CPT | Performed by: FAMILY MEDICINE

## 2021-10-25 PROCEDURE — 99495 TRANSJ CARE MGMT MOD F2F 14D: CPT | Performed by: FAMILY MEDICINE

## 2021-10-25 RX ORDER — AMLODIPINE BESYLATE 10 MG/1
10 TABLET ORAL DAILY
Qty: 90 TABLET | Refills: 1 | Status: SHIPPED | OUTPATIENT
Start: 2021-10-25 | End: 2022-05-02

## 2021-10-25 RX ORDER — ATORVASTATIN CALCIUM 40 MG/1
40 TABLET, FILM COATED ORAL
Qty: 90 TABLET | Refills: 1 | Status: SHIPPED | OUTPATIENT
Start: 2021-10-25 | End: 2022-06-30

## 2021-10-25 RX ORDER — LISINOPRIL 10 MG/1
10 TABLET ORAL DAILY
Qty: 90 TABLET | Refills: 1 | Status: SHIPPED | OUTPATIENT
Start: 2021-10-25 | End: 2022-04-29 | Stop reason: ALTCHOICE

## 2021-10-25 RX ORDER — SENNA AND DOCUSATE SODIUM 50; 8.6 MG/1; MG/1
1 TABLET, FILM COATED ORAL 2 TIMES DAILY
COMMUNITY
End: 2022-03-23

## 2021-10-27 ENCOUNTER — TELEPHONE (OUTPATIENT)
Dept: NEUROLOGY | Facility: CLINIC | Age: 76
End: 2021-10-27

## 2021-10-29 ENCOUNTER — TELEPHONE (OUTPATIENT)
Dept: NEUROLOGY | Facility: CLINIC | Age: 76
End: 2021-10-29

## 2021-11-02 ENCOUNTER — OFFICE VISIT (OUTPATIENT)
Dept: NEUROLOGY | Facility: CLINIC | Age: 76
End: 2021-11-02
Payer: COMMERCIAL

## 2021-11-02 VITALS
TEMPERATURE: 97.2 F | DIASTOLIC BLOOD PRESSURE: 68 MMHG | BODY MASS INDEX: 26.92 KG/M2 | SYSTOLIC BLOOD PRESSURE: 123 MMHG | HEIGHT: 70 IN | WEIGHT: 188 LBS | HEART RATE: 89 BPM

## 2021-11-02 DIAGNOSIS — I61.9 HEMORRHAGIC STROKE (HCC): Primary | ICD-10-CM

## 2021-11-02 DIAGNOSIS — I10 PRIMARY HYPERTENSION: ICD-10-CM

## 2021-11-02 DIAGNOSIS — I65.22 CAROTID STENOSIS, LEFT: ICD-10-CM

## 2021-11-02 DIAGNOSIS — I65.21 RIGHT INTERNAL CAROTID OCCLUSION: ICD-10-CM

## 2021-11-02 PROBLEM — I63.81 LEFT SIDED LACUNAR INFARCTION (HCC): Status: RESOLVED | Noted: 2020-12-30 | Resolved: 2021-11-02

## 2021-11-02 PROCEDURE — 3074F SYST BP LT 130 MM HG: CPT | Performed by: PHYSICIAN ASSISTANT

## 2021-11-02 PROCEDURE — 99214 OFFICE O/P EST MOD 30 MIN: CPT | Performed by: PHYSICIAN ASSISTANT

## 2021-11-02 PROCEDURE — 3078F DIAST BP <80 MM HG: CPT | Performed by: PHYSICIAN ASSISTANT

## 2021-11-02 RX ORDER — ACETAMINOPHEN 325 MG/1
650 TABLET ORAL EVERY 6 HOURS PRN
COMMUNITY

## 2021-11-02 RX ORDER — POLYETHYLENE GLYCOL 3350 17 G/17G
17 POWDER, FOR SOLUTION ORAL DAILY
COMMUNITY
End: 2022-03-23

## 2021-11-04 ENCOUNTER — OFFICE VISIT (OUTPATIENT)
Dept: VASCULAR SURGERY | Facility: CLINIC | Age: 76
End: 2021-11-04
Payer: COMMERCIAL

## 2021-11-04 VITALS
HEART RATE: 74 BPM | SYSTOLIC BLOOD PRESSURE: 112 MMHG | HEIGHT: 70 IN | BODY MASS INDEX: 26.63 KG/M2 | DIASTOLIC BLOOD PRESSURE: 60 MMHG | WEIGHT: 186 LBS

## 2021-11-04 DIAGNOSIS — I65.22 CAROTID STENOSIS, LEFT: ICD-10-CM

## 2021-11-04 DIAGNOSIS — I65.21 RIGHT INTERNAL CAROTID OCCLUSION: ICD-10-CM

## 2021-11-04 DIAGNOSIS — I61.9 HEMORRHAGIC STROKE (HCC): Primary | ICD-10-CM

## 2021-11-04 DIAGNOSIS — I10 PRIMARY HYPERTENSION: ICD-10-CM

## 2021-11-04 PROCEDURE — 99214 OFFICE O/P EST MOD 30 MIN: CPT | Performed by: SURGERY

## 2021-11-07 ENCOUNTER — HOSPITAL ENCOUNTER (OUTPATIENT)
Dept: CT IMAGING | Facility: HOSPITAL | Age: 76
Discharge: HOME | End: 2021-11-07
Payer: COMMERCIAL

## 2021-11-07 DIAGNOSIS — I61.9 HEMORRHAGIC STROKE (HCC): ICD-10-CM

## 2021-11-07 PROCEDURE — 70450 CT HEAD/BRAIN W/O DYE: CPT

## 2021-11-07 PROCEDURE — G1004 CDSM NDSC: HCPCS

## 2021-11-10 ENCOUNTER — TELEPHONE (OUTPATIENT)
Dept: NEUROLOGY | Facility: CLINIC | Age: 76
End: 2021-11-10

## 2021-11-19 ENCOUNTER — RA CDI HCC (OUTPATIENT)
Dept: OTHER | Facility: HOSPITAL | Age: 76
End: 2021-11-19

## 2021-11-29 ENCOUNTER — OFFICE VISIT (OUTPATIENT)
Dept: FAMILY MEDICINE CLINIC | Facility: CLINIC | Age: 76
End: 2021-11-29
Payer: COMMERCIAL

## 2021-11-29 VITALS
BODY MASS INDEX: 26.63 KG/M2 | WEIGHT: 186 LBS | SYSTOLIC BLOOD PRESSURE: 110 MMHG | DIASTOLIC BLOOD PRESSURE: 60 MMHG | HEIGHT: 70 IN | TEMPERATURE: 97.6 F

## 2021-11-29 DIAGNOSIS — I61.9 HEMORRHAGIC STROKE (HCC): Primary | ICD-10-CM

## 2021-11-29 DIAGNOSIS — E78.2 MIXED HYPERLIPIDEMIA: ICD-10-CM

## 2021-11-29 DIAGNOSIS — I10 PRIMARY HYPERTENSION: ICD-10-CM

## 2021-11-29 DIAGNOSIS — R73.03 PREDIABETES: ICD-10-CM

## 2021-11-29 PROCEDURE — 1160F RVW MEDS BY RX/DR IN RCRD: CPT | Performed by: FAMILY MEDICINE

## 2021-11-29 PROCEDURE — 1036F TOBACCO NON-USER: CPT | Performed by: FAMILY MEDICINE

## 2021-11-29 PROCEDURE — 99214 OFFICE O/P EST MOD 30 MIN: CPT | Performed by: FAMILY MEDICINE

## 2021-12-01 ENCOUNTER — TELEPHONE (OUTPATIENT)
Dept: FAMILY MEDICINE CLINIC | Facility: CLINIC | Age: 76
End: 2021-12-01

## 2021-12-16 ENCOUNTER — APPOINTMENT (OUTPATIENT)
Dept: RADIOLOGY | Facility: MEDICAL CENTER | Age: 76
End: 2021-12-16
Payer: COMMERCIAL

## 2021-12-16 ENCOUNTER — OFFICE VISIT (OUTPATIENT)
Dept: URGENT CARE | Facility: MEDICAL CENTER | Age: 76
End: 2021-12-16
Payer: COMMERCIAL

## 2021-12-16 VITALS
BODY MASS INDEX: 26.69 KG/M2 | OXYGEN SATURATION: 95 % | TEMPERATURE: 97.7 F | WEIGHT: 186 LBS | SYSTOLIC BLOOD PRESSURE: 132 MMHG | RESPIRATION RATE: 18 BRPM | DIASTOLIC BLOOD PRESSURE: 70 MMHG | HEART RATE: 118 BPM

## 2021-12-16 DIAGNOSIS — R05.9 COUGH: ICD-10-CM

## 2021-12-16 DIAGNOSIS — R05.9 COUGH: Primary | ICD-10-CM

## 2021-12-16 PROCEDURE — 71046 X-RAY EXAM CHEST 2 VIEWS: CPT

## 2021-12-16 PROCEDURE — 99213 OFFICE O/P EST LOW 20 MIN: CPT

## 2021-12-16 RX ORDER — GUAIFENESIN 600 MG
600 TABLET, EXTENDED RELEASE 12 HR ORAL EVERY 12 HOURS SCHEDULED
Qty: 20 TABLET | Refills: 0 | Status: SHIPPED | OUTPATIENT
Start: 2021-12-16 | End: 2021-12-26

## 2021-12-17 ENCOUNTER — TELEMEDICINE (OUTPATIENT)
Dept: FAMILY MEDICINE CLINIC | Facility: CLINIC | Age: 76
End: 2021-12-17
Payer: COMMERCIAL

## 2021-12-17 DIAGNOSIS — B34.9 VIRAL INFECTION, UNSPECIFIED: Primary | ICD-10-CM

## 2021-12-17 PROCEDURE — 87636 SARSCOV2 & INF A&B AMP PRB: CPT | Performed by: FAMILY MEDICINE

## 2021-12-17 PROCEDURE — 99212 OFFICE O/P EST SF 10 MIN: CPT | Performed by: FAMILY MEDICINE

## 2021-12-17 RX ORDER — PREDNISONE 10 MG/1
TABLET ORAL
Qty: 15 TABLET | Refills: 0 | Status: SHIPPED | OUTPATIENT
Start: 2021-12-17 | End: 2022-01-27

## 2021-12-21 ENCOUNTER — TELEPHONE (OUTPATIENT)
Dept: FAMILY MEDICINE CLINIC | Facility: CLINIC | Age: 76
End: 2021-12-21

## 2021-12-21 DIAGNOSIS — J40 BRONCHITIS: Primary | ICD-10-CM

## 2021-12-21 RX ORDER — DOXYCYCLINE HYCLATE 100 MG/1
100 CAPSULE ORAL EVERY 12 HOURS SCHEDULED
Qty: 20 CAPSULE | Refills: 0 | Status: SHIPPED | OUTPATIENT
Start: 2021-12-21 | End: 2021-12-31

## 2021-12-21 RX ORDER — BENZONATATE 200 MG/1
200 CAPSULE ORAL 3 TIMES DAILY PRN
Qty: 20 CAPSULE | Refills: 0 | Status: SHIPPED | OUTPATIENT
Start: 2021-12-21 | End: 2022-01-27

## 2022-01-07 DIAGNOSIS — I65.21 STENOSIS OF RIGHT CAROTID ARTERY: ICD-10-CM

## 2022-01-07 DIAGNOSIS — I63.81 LEFT SIDED LACUNAR INFARCTION (HCC): ICD-10-CM

## 2022-01-07 RX ORDER — CLOPIDOGREL BISULFATE 75 MG/1
75 TABLET ORAL DAILY
Qty: 90 TABLET | Refills: 1 | Status: SHIPPED | OUTPATIENT
Start: 2022-01-07 | End: 2022-06-30

## 2022-01-27 ENCOUNTER — OFFICE VISIT (OUTPATIENT)
Dept: FAMILY MEDICINE CLINIC | Facility: CLINIC | Age: 77
End: 2022-01-27
Payer: COMMERCIAL

## 2022-01-27 VITALS
WEIGHT: 190 LBS | BODY MASS INDEX: 27.2 KG/M2 | TEMPERATURE: 97.6 F | SYSTOLIC BLOOD PRESSURE: 118 MMHG | DIASTOLIC BLOOD PRESSURE: 62 MMHG | HEIGHT: 70 IN

## 2022-01-27 DIAGNOSIS — I65.21 RIGHT INTERNAL CAROTID OCCLUSION: ICD-10-CM

## 2022-01-27 DIAGNOSIS — I10 PRIMARY HYPERTENSION: ICD-10-CM

## 2022-01-27 DIAGNOSIS — I65.22 CAROTID STENOSIS, LEFT: ICD-10-CM

## 2022-01-27 DIAGNOSIS — E78.2 MIXED HYPERLIPIDEMIA: ICD-10-CM

## 2022-01-27 DIAGNOSIS — R73.03 PREDIABETES: ICD-10-CM

## 2022-01-27 DIAGNOSIS — I61.9 HEMORRHAGIC STROKE (HCC): Primary | ICD-10-CM

## 2022-01-27 PROCEDURE — 99214 OFFICE O/P EST MOD 30 MIN: CPT | Performed by: FAMILY MEDICINE

## 2022-01-27 PROCEDURE — 3074F SYST BP LT 130 MM HG: CPT | Performed by: FAMILY MEDICINE

## 2022-01-27 PROCEDURE — 3078F DIAST BP <80 MM HG: CPT | Performed by: FAMILY MEDICINE

## 2022-01-27 PROCEDURE — 1160F RVW MEDS BY RX/DR IN RCRD: CPT | Performed by: FAMILY MEDICINE

## 2022-01-27 PROCEDURE — 1036F TOBACCO NON-USER: CPT | Performed by: FAMILY MEDICINE

## 2022-01-27 NOTE — PROGRESS NOTES
Assessment/Plan:    Hemorrhagic stroke [facility])  Continue with therapy for now  Carotid stenosis, left  Follow-up with vascular surgery, recent carotid studies were reviewed  Primary hypertension  Continue amlodipine 10 mg daily, lisinopril 10 mg daily would recommend we keep his systolic blood pressure between 110-130  Mixed hyperlipidemia  Continue atorvastatin recheck lab in April    Prediabetes  Recheck lab in April       Diagnoses and all orders for this visit:    Hemorrhagic stroke ([facility] [address] )    Primary hypertension  -     CBC and differential; Future  -     Comprehensive metabolic panel; Future  -     Lipid panel; Future  -     TSH, 3rd generation; Future    Mixed hyperlipidemia  -     CBC and differential; Future  -     Comprehensive metabolic panel; Future  -     Lipid panel; Future  -     TSH, 3rd generation; Future    Carotid stenosis, left    Right internal carotid occlusion    Prediabetes  -     Comprehensive metabolic panel; Future  -     Hemoglobin A1C; Future          Subjective:   Chief Complaint   Patient presents with    Follow-up     hemorrhagic stroke           Patient ID: Florence Antoine is a 64 y o  male  Patient presents to the office with multiple questions about his recovery, exactly what happened to him, and what he can do in the for C able future  He seems to have a poor understanding of the mechanism of his stroke  Tried to explain this to him the best of my ability  Voiced understanding  Still going to speech therapy, has some moderate expressive aphasia with difficulty choosing words at time  This continues to improve  His speech in the office today is somewhat fluid  He has electric shin by trade, his son would like him to try to come to work at least part-time  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  He voices frustration at his current lack of activity and participation in what he considers useful appointment    He is also interested in returning to driving  To my knowledge, no one revoked his driving privileges, but I believe he was told not to drive until cleared by his physicians  The following portions of the patient's history were reviewed and updated as appropriate: allergies, current medications, past family history, past medical history, past social history, past surgical history and problem list     Review of Systems   Constitutional: Negative for appetite change, chills, diaphoresis, fatigue, fever and unexpected weight change  HENT: Negative for congestion, ear pain, hearing loss, nosebleeds, postnasal drip, rhinorrhea, sinus pressure, sore throat, tinnitus and trouble swallowing  Eyes: Negative for photophobia, pain, discharge, redness, itching and visual disturbance  Respiratory: Negative for cough, chest tightness, shortness of breath and wheezing  Cardiovascular: Negative for chest pain, palpitations and leg swelling  Denies orthopnea , dyspnea on exertion   Gastrointestinal: Negative for abdominal distention, abdominal pain, blood in stool, constipation, diarrhea, nausea and vomiting  Endocrine: Negative  Genitourinary: Negative for difficulty urinating, dysuria, flank pain, frequency, hematuria and urgency  Denies nocturia , erectile dysfunction   Musculoskeletal: Negative for arthralgias, back pain, gait problem, joint swelling and myalgias  Skin: Negative for pallor, rash and wound  Denies skin lesions   Allergic/Immunologic: Negative for environmental allergies, food allergies and immunocompromised state  Neurological: Negative for dizziness, tremors, seizures, syncope, speech difficulty, weakness, numbness and headaches  As per the HPI   Hematological: Negative for adenopathy  Does not bruise/bleed easily  Psychiatric/Behavioral: Negative for behavioral problems, confusion, sleep disturbance and suicidal ideas  The patient is not nervous/anxious            Objective:      /62 Temp 97 6 °F (36 4 °C)   Ht 5' 10" (1 778 m)   Wt 86 2 kg (190 lb)   BMI 27 26 kg/m²          Physical Exam  Vitals and nursing note reviewed  Constitutional:       Appearance: Normal appearance  He is well-developed  HENT:      Head: Normocephalic and atraumatic  Right Ear: Tympanic membrane and ear canal normal       Left Ear: Tympanic membrane and ear canal normal       Nose: Nose normal    Eyes:      Conjunctiva/sclera: Conjunctivae normal       Pupils: Pupils are equal, round, and reactive to light  Neck:      Thyroid: No thyromegaly  Vascular: No JVD  Trachea: No tracheal deviation  Cardiovascular:      Rate and Rhythm: Normal rate and regular rhythm  Heart sounds: No murmur heard  Pulmonary:      Effort: Pulmonary effort is normal       Breath sounds: Normal breath sounds  No wheezing or rales  Abdominal:      General: Bowel sounds are normal       Palpations: Abdomen is soft  There is no mass  Tenderness: There is no abdominal tenderness  There is no guarding or rebound  Musculoskeletal:         General: No tenderness or deformity  Cervical back: Normal range of motion and neck supple  Lymphadenopathy:      Cervical: No cervical adenopathy  Skin:     General: Skin is warm and dry  Capillary Refill: Capillary refill takes less than 2 seconds  Findings: No lesion or rash  Nails: There is no clubbing  Neurological:      General: No focal deficit present  Mental Status: He is alert and oriented to person, place, and time  Cranial Nerves: No cranial nerve deficit  Sensory: No sensory deficit  Motor: No weakness or abnormal muscle tone  Coordination: Coordination normal       Deep Tendon Reflexes: Reflexes are normal and symmetric   Reflexes normal    Psychiatric:         Mood and Affect: Mood normal          Behavior: Behavior normal          Judgment: Judgment normal

## 2022-01-27 NOTE — ASSESSMENT & PLAN NOTE
Continue amlodipine 10 mg daily, lisinopril 10 mg daily would recommend we keep his systolic blood pressure between 110-130

## 2022-03-02 ENCOUNTER — TELEPHONE (OUTPATIENT)
Dept: FAMILY MEDICINE CLINIC | Facility: CLINIC | Age: 77
End: 2022-03-02

## 2022-03-02 NOTE — TELEPHONE ENCOUNTER
----- Message from Amol Martinez DO sent at 3/2/2022 11:32 AM EST -----  Call the patient, let him know I contacted Neurology  We feel that as long as he limits himself to daytime driving, no long distance driving, she he may drive at this time

## 2022-03-23 ENCOUNTER — OFFICE VISIT (OUTPATIENT)
Dept: FAMILY MEDICINE CLINIC | Facility: CLINIC | Age: 77
End: 2022-03-23
Payer: COMMERCIAL

## 2022-03-23 VITALS
BODY MASS INDEX: 27.77 KG/M2 | HEIGHT: 70 IN | DIASTOLIC BLOOD PRESSURE: 68 MMHG | SYSTOLIC BLOOD PRESSURE: 110 MMHG | TEMPERATURE: 97.7 F | WEIGHT: 194 LBS

## 2022-03-23 DIAGNOSIS — I10 PRIMARY HYPERTENSION: ICD-10-CM

## 2022-03-23 DIAGNOSIS — I61.9 HEMORRHAGIC STROKE (HCC): ICD-10-CM

## 2022-03-23 DIAGNOSIS — E78.2 MIXED HYPERLIPIDEMIA: ICD-10-CM

## 2022-03-23 DIAGNOSIS — J31.0 CHRONIC RHINITIS: Primary | ICD-10-CM

## 2022-03-23 DIAGNOSIS — G47.33 OBSTRUCTIVE SLEEP APNEA SYNDROME: ICD-10-CM

## 2022-03-23 PROCEDURE — 1036F TOBACCO NON-USER: CPT | Performed by: FAMILY MEDICINE

## 2022-03-23 PROCEDURE — 3078F DIAST BP <80 MM HG: CPT | Performed by: FAMILY MEDICINE

## 2022-03-23 PROCEDURE — 99214 OFFICE O/P EST MOD 30 MIN: CPT | Performed by: FAMILY MEDICINE

## 2022-03-23 PROCEDURE — 3074F SYST BP LT 130 MM HG: CPT | Performed by: FAMILY MEDICINE

## 2022-03-23 PROCEDURE — 1160F RVW MEDS BY RX/DR IN RCRD: CPT | Performed by: FAMILY MEDICINE

## 2022-03-23 RX ORDER — FLUTICASONE PROPIONATE 50 MCG
1 SPRAY, SUSPENSION (ML) NASAL DAILY
Qty: 16 G | Refills: 5 | Status: SHIPPED | OUTPATIENT
Start: 2022-03-23

## 2022-03-23 NOTE — PROGRESS NOTES
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 than 4-6 hours per day  Diagnoses and all orders for this visit:    Chronic rhinitis  -     fluticasone (FLONASE) 50 mcg/act nasal spray; 1 spray into each nostril daily    Obstructive sleep apnea syndrome  -     Home Study; Future    Primary hypertension    Mixed hyperlipidemia    Hemorrhagic stroke [facility])          Subjective:   Chief Complaint   Patient presents with    throat irritation      x 1 day           Patient ID: Andrea Albarran is a 64 y o  male  Presents to the office complaining of throat irritation, difficulty swallowing  This is actually an ongoing issue, has been present for years but just got bad over the last week with yesterday being particularly uncomfortable  States he generally has sinus congestion and a runny nose but it gets worse in the spring  He also has muscle questions about his limitations for physical activity from his stroke  His partner reports that he snores loudly, has witnessed apneic episodes, daytime somnolence, falls asleep easily when sedentary  He has unrestful sleep, change in his memory, irritability  The following portions of the patient's history were reviewed and updated as appropriate: allergies, current medications, past family history, past medical history, past social history, past surgical history and problem list     Review of Systems   Constitutional: Negative for appetite change, chills, diaphoresis, fatigue, fever and unexpected weight change     HENT: Positive for congestion, hearing loss, postnasal drip, rhinorrhea and sore throat  Negative for ear pain (Occasional pressure), nosebleeds, sinus pressure, sinus pain, tinnitus and trouble swallowing  Eyes: Negative for photophobia, pain, discharge, redness, itching and visual disturbance  Respiratory: Negative for cough, chest tightness, shortness of breath and wheezing  Cardiovascular: Negative for chest pain, palpitations and leg swelling  Denies orthopnea , dyspnea on exertion   Gastrointestinal: Negative for abdominal distention, abdominal pain, blood in stool, constipation, diarrhea, nausea and vomiting  Frequent soft stools through the day, needs to wipe multiple times to get himself clean  Feels he leaks a little when he passes gas  Has been taking laxatives since he was discharged from the hospital would like to discontinue this at this time  Endocrine: Negative  Genitourinary: Negative for difficulty urinating, dysuria, flank pain, frequency, hematuria and urgency  Denies nocturia , erectile dysfunction   Musculoskeletal: Negative for arthralgias, back pain, gait problem, joint swelling and myalgias  Skin: Negative for pallor, rash and wound  Denies skin lesions   Allergic/Immunologic: Negative for environmental allergies, food allergies and immunocompromised state  Neurological: Negative for dizziness, tremors, seizures, syncope, speech difficulty, weakness, numbness and headaches  Hematological: Negative for adenopathy  Does not bruise/bleed easily  Psychiatric/Behavioral: Positive for sleep disturbance  Negative for behavioral problems, confusion and suicidal ideas  The patient is not nervous/anxious  Objective:      /68   Temp 97 7 °F (36 5 °C)   Ht 5' 10" (1 778 m)   Wt 88 kg (194 lb)   BMI 27 84 kg/m²          Physical Exam  Vitals and nursing note reviewed  Constitutional:       Appearance: He is well-developed  HENT:      Head: Normocephalic and atraumatic        Right Ear: Tympanic membrane and ear canal normal       Left Ear: Tympanic membrane and ear canal normal       Nose: Nose normal    Eyes:      Conjunctiva/sclera: Conjunctivae normal       Pupils: Pupils are equal, round, and reactive to light  Neck:      Thyroid: No thyromegaly  Vascular: No JVD  Trachea: No tracheal deviation  Cardiovascular:      Rate and Rhythm: Normal rate and regular rhythm  Heart sounds: No murmur heard  Pulmonary:      Effort: Pulmonary effort is normal       Breath sounds: Normal breath sounds  No wheezing or rales  Abdominal:      General: Bowel sounds are normal       Palpations: Abdomen is soft  There is no mass  Tenderness: There is no abdominal tenderness  There is no guarding or rebound  Musculoskeletal:         General: No tenderness or deformity  Cervical back: Normal range of motion and neck supple  Lymphadenopathy:      Cervical: No cervical adenopathy  Skin:     General: Skin is warm and dry  Capillary Refill: Capillary refill takes less than 2 seconds  Findings: No lesion or rash  Nails: There is no clubbing  Neurological:      General: No focal deficit present  Mental Status: He is alert and oriented to person, place, and time  Cranial Nerves: No cranial nerve deficit  Motor: No abnormal muscle tone  Coordination: Coordination normal       Deep Tendon Reflexes: Reflexes are normal and symmetric  Reflexes normal    Psychiatric:         Mood and Affect: Mood normal          Behavior: Behavior normal          Thought Content:  Thought content normal          Judgment: Judgment normal

## 2022-03-24 ENCOUNTER — TELEPHONE (OUTPATIENT)
Dept: FAMILY MEDICINE CLINIC | Facility: CLINIC | Age: 77
End: 2022-03-24

## 2022-03-25 ENCOUNTER — TELEPHONE (OUTPATIENT)
Dept: NEUROLOGY | Facility: CLINIC | Age: 77
End: 2022-03-25

## 2022-03-25 NOTE — TELEPHONE ENCOUNTER
----- Message from Maurice Manrique MD sent at 3/24/2022  6:59 PM EDT -----  approved  ----- Message -----  From: Olive Witt  Sent: 3/24/2022   8:28 AM EDT  To: Sleep Medicine [facility] Provider    This sleep study needs approval      If approved please sign and return to clerical pool  If denied please include reasons why  Also provide alternative testing if warranted  Please sign and return to clerical pool

## 2022-04-01 ENCOUNTER — TELEPHONE (OUTPATIENT)
Dept: NEUROLOGY | Facility: CLINIC | Age: 77
End: 2022-04-01

## 2022-04-01 NOTE — TELEPHONE ENCOUNTER
Called and spoke with patients wife to reschedule an appt on 5/17/22 with Dr Giana Thurman because he will not be in the office

## 2022-04-08 ENCOUNTER — OFFICE VISIT (OUTPATIENT)
Dept: NEUROLOGY | Facility: CLINIC | Age: 77
End: 2022-04-08
Payer: COMMERCIAL

## 2022-04-08 VITALS
SYSTOLIC BLOOD PRESSURE: 134 MMHG | RESPIRATION RATE: 18 BRPM | HEIGHT: 70 IN | WEIGHT: 199 LBS | HEART RATE: 74 BPM | TEMPERATURE: 97.3 F | BODY MASS INDEX: 28.49 KG/M2 | DIASTOLIC BLOOD PRESSURE: 63 MMHG

## 2022-04-08 DIAGNOSIS — I65.22 CAROTID STENOSIS, LEFT: ICD-10-CM

## 2022-04-08 DIAGNOSIS — I65.21 RIGHT INTERNAL CAROTID OCCLUSION: ICD-10-CM

## 2022-04-08 DIAGNOSIS — I10 PRIMARY HYPERTENSION: ICD-10-CM

## 2022-04-08 DIAGNOSIS — H93.8X3 SENSATION OF FULLNESS IN BOTH EARS: ICD-10-CM

## 2022-04-08 DIAGNOSIS — I61.9 HEMORRHAGIC STROKE (HCC): Primary | ICD-10-CM

## 2022-04-08 PROCEDURE — 3078F DIAST BP <80 MM HG: CPT | Performed by: PHYSICIAN ASSISTANT

## 2022-04-08 PROCEDURE — 3075F SYST BP GE 130 - 139MM HG: CPT | Performed by: PHYSICIAN ASSISTANT

## 2022-04-08 PROCEDURE — 99214 OFFICE O/P EST MOD 30 MIN: CPT | Performed by: PHYSICIAN ASSISTANT

## 2022-04-08 NOTE — PROGRESS NOTES
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  He denies any new neurologic symptoms to indicate recurrent TIA/CVA  He completed therapies and has done excellent in recovery  He is back on Plavix, which was resumed a week after discovery of the bleed  Blood pressure has been well controlled at home  We discussed importance of blood pressure control  He should continue to monitor at home  We also discussed importance of avoiding hypotension given the status of his carotids  I encouraged him to maintain adequate hydration  He follows with vascular surgery for monitoring of his carotid arteries  He is scheduled for a doppler later this month  Of note he asked me to place a referral to ENT for ear fullness and eval of decreased hearing  Plan:  -continue Plavix 75mg daily and Lipitor 40mg daily  -continue blood pressure control under the direction of the PCP  Continue to monitor BP at home with goal less than 130/80 routinely  Avoid hypotension, stay hydrated  -continue to follow with vascular surgery for monitoring of R carotid occlusion and L carotid stenosis  -discussed if he has pain/headache to take Tylenol and avoid NSAIDs  -heart healthy diet and routine exercise as tolerated   -ENT referral per patient request  -follow up in 6 months or sooner if needed     Signs and symptoms of stroke reviewed and included in the AVS today  Diagnoses and all orders for this visit:    Hemorrhagic stroke [facility])    Right internal carotid occlusion    Carotid stenosis, left    Primary hypertension    Sensation of fullness in both ears  -     Ambulatory Referral to Otolaryngology;  Future Subjective:    HPI    Sabine Clark is a 64 y o  male with PMH of HTN, known R ICA occlusion and stenosis of the L ICA on Plavix, and history of prostate cancer, who presents today for neurologic follow up of hemorrhagic stroke  Patient initially presented to [facility] ED on 10/2/21 due to expressive aphasia  Upon arrival to the ED, blood pressure 203/87  NIHSS 5 per ED notes  CT head showed acute parenchymal hemorrhage within the left thalamus, likely hypertensive hemorrhage  ICH score 0  CTA head/neck showed occlusion of the right ICA at its origin with reconstitution at the level the ICA terminus  M1 segments are patent bilaterally  No AVM or other vascular malformation noted  Patient was not a tPA candidate due to intraparenchymal hemorrhage  Patient was on Plavix at home, so he received DDAVP  Repeat CT head on 10/3 showed stable left thalamic hemorrhage, however new intraventricular hemorrhage layering within the occipital horns of the lateral ventricles (L>R)  Echo with EF 75%, no regional wall motion abnormalities, moderately dilated left atrium, mildly dilated right atrium  A1C 5 9, LDL 57  MRI brain initially deferred, but then completed 10/6/21  Unchanged acute intraparenchymal hemorrhage centered in left thalamus with mild surrounding vasogenic edema, mild mass effect on 3rd ventricle, trace intraventricular hemorrhage, and stable mild ventriculomegaly  There was no evidence of CAA  Inpatient team felt this was primary hypertensive hemorrhage  Carotid doppler showed known R ICA occlusion and 50-69% stenosis of L ICA  Case was discussed with vascular surgery and they did not feel intervention was needed  He was to continue outpatient follow up with vascular surgery  He went to rehab on discharge  At time of HFU visit on 11/2/21, he was doing very well   he was in outpatient therapies through [facility] and was following with their physiatrist as well       He had repeat [facility] 11/7/2021 which demonstrated resolutions of the left thalamic hematoma and intraventricular hemorrhage  There is evolving gliosis and encephalomalacia in the left thalamus in the region of prior hemorrhage  Today, patient reports he is doing well  His partner Phani Tamayo would agree  He is finished with therapies, back to working and driving  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  He notices his  strength is not as great, but also has some stiffness when making fists with both hands  On further discussion, it sounds like he also has some arthritis  When he is tired, he notices some difficulty with speech  His PCP recently ordered a sleep study due to concern for sleep apnea  BP is well controlled at home  He sees vascular for monitoring of his carotids and has a duplex scheduled for next week  The following portions of the patient's history were reviewed and updated as appropriate: current medications, past family history, past medical history, past social history, past surgical history and problem list          Objective:    Blood pressure 134/63, pulse 74, temperature (!) 97 3 °F (36 3 °C), temperature source Tympanic, resp  rate 18, height 5' 10" (1 778 m), weight 90 3 kg (199 lb)  Physical Exam  Constitutional:       Appearance: Normal appearance  HENT:      Head: Normocephalic and atraumatic  Eyes:      Extraocular Movements: EOM normal       Pupils: Pupils are equal, round, and reactive to light  Neurological:      Mental Status: He is alert  Coordination: Coordination is intact  Deep Tendon Reflexes: Strength normal and reflexes are normal and symmetric  Psychiatric:         Mood and Affect: Mood normal          Speech: Speech normal          Behavior: Behavior normal          Neurological Exam  Mental Status  Alert  Oriented to person, place, time and situation  Speech is normal  Language is fluent with no aphasia   Attention and concentration are normal     Cranial Nerves  CN II: Visual fields full to confrontation  CN III, IV, VI: Extraocular movements intact bilaterally  Pupils equal round and reactive to light bilaterally  CN V: Facial sensation is normal   CN VII: Full and symmetric facial movement  CN VIII: Hearing is normal   CN IX, X: Palate elevates symmetrically  CN XI: Shoulder shrug strength is normal   CN XII: Tongue midline without atrophy or fasciculations  Motor   Normal muscle tone  Strength is 5/5 throughout all four extremities  Sensory  Light touch is normal in upper and lower extremities  Reflexes  Deep tendon reflexes are 2+ and symmetric in all four extremities with downgoing toes bilaterally  Coordination  Finger-to-nose, rapid alternating movements and heel-to-shin normal bilaterally without dysmetria  Gait  Casual gait is normal including stance, stride, and arm swing  ROS:    Review of Systems   Constitutional: Negative for chills and fever  HENT: Negative for ear pain and sore throat  Eyes: Negative for pain and visual disturbance  Respiratory: Negative for cough and shortness of breath  Cardiovascular: Negative for chest pain and palpitations  Gastrointestinal: Negative for abdominal pain and vomiting  Genitourinary: Negative for dysuria and hematuria  Musculoskeletal: Positive for myalgias  Negative for arthralgias and back pain  Skin: Negative for color change and rash  Neurological: Positive for dizziness (at times if he moves quick), weakness (right side ) and numbness (right hand fingertips)  Negative for seizures and syncope  All other systems reviewed and are negative      I personally reviewed and updated the ROS as appropriate

## 2022-04-08 NOTE — PATIENT INSTRUCTIONS
Continue Plavix 75mg daily and Lipitor 40mg daily   Continue to follow closely with PCP for management of blood pressure, as well as cholesterol and blood sugar  Continue to follow with vascular surgery for ongoing monitoring of the carotid arteries  Heart healthy diet and routine exercise as tolerated  I placed a referral to ENT for you  Follow up in 6 months    If you experience any facial droop, weakness on one side of the body, speech or swallowing difficulty, painless loss of vision in one eye, double vision, vertigo that does not resolve quickly/imbalance, go to the ER/call 911

## 2022-04-13 ENCOUNTER — HOSPITAL ENCOUNTER (OUTPATIENT)
Dept: NON INVASIVE DIAGNOSTICS | Facility: CLINIC | Age: 77
Discharge: HOME | End: 2022-04-13
Payer: COMMERCIAL

## 2022-04-13 DIAGNOSIS — I65.21 RIGHT INTERNAL CAROTID OCCLUSION: ICD-10-CM

## 2022-04-13 DIAGNOSIS — I65.22 CAROTID STENOSIS, LEFT: ICD-10-CM

## 2022-04-13 PROCEDURE — 93880 EXTRACRANIAL BILAT STUDY: CPT

## 2022-04-14 PROCEDURE — 93880 EXTRACRANIAL BILAT STUDY: CPT | Performed by: SURGERY

## 2022-04-19 ENCOUNTER — TRANSCRIBE ORDERS (OUTPATIENT)
Dept: VASCULAR SURGERY | Facility: CLINIC | Age: 77
End: 2022-04-19

## 2022-04-19 DIAGNOSIS — I65.22 CAROTID STENOSIS, LEFT: Primary | ICD-10-CM

## 2022-04-21 ENCOUNTER — APPOINTMENT (OUTPATIENT)
Dept: LAB | Facility: CLINIC | Age: 77
End: 2022-04-21
Payer: COMMERCIAL

## 2022-04-21 DIAGNOSIS — R73.03 PREDIABETES: ICD-10-CM

## 2022-04-21 DIAGNOSIS — I10 PRIMARY HYPERTENSION: ICD-10-CM

## 2022-04-21 DIAGNOSIS — E78.2 MIXED HYPERLIPIDEMIA: ICD-10-CM

## 2022-04-21 LAB
ALBUMIN SERPL BCP-MCNC: 4.1 G/DL (ref 3.5–5)
ALP SERPL-CCNC: 99 U/L (ref 46–116)
ALT SERPL W P-5'-P-CCNC: 38 U/L (ref 12–78)
ANION GAP SERPL CALCULATED.3IONS-SCNC: 3 MMOL/L (ref 4–13)
AST SERPL W P-5'-P-CCNC: 19 U/L (ref 5–45)
BASOPHILS # BLD AUTO: 0.02 THOUSANDS/ΜL (ref 0–0.1)
BASOPHILS NFR BLD AUTO: 0 % (ref 0–1)
BILIRUB SERPL-MCNC: 0.76 MG/DL (ref 0.2–1)
BUN SERPL-MCNC: 17 MG/DL (ref 5–25)
CALCIUM SERPL-MCNC: 9.4 MG/DL (ref 8.3–10.1)
CHLORIDE SERPL-SCNC: 108 MMOL/L (ref 100–108)
CHOLEST SERPL-MCNC: 138 MG/DL
CO2 SERPL-SCNC: 29 MMOL/L (ref 21–32)
CREAT SERPL-MCNC: 1.01 MG/DL (ref 0.6–1.3)
EOSINOPHIL # BLD AUTO: 0.11 THOUSAND/ΜL (ref 0–0.61)
EOSINOPHIL NFR BLD AUTO: 2 % (ref 0–6)
ERYTHROCYTE [DISTWIDTH] IN BLOOD BY AUTOMATED COUNT: 12.7 % (ref 11.6–15.1)
EST. AVERAGE GLUCOSE BLD GHB EST-MCNC: 123 MG/DL
GFR SERPL CREATININE-BSD FRML MDRD: 70 ML/MIN/1.73SQ M
GLUCOSE P FAST SERPL-MCNC: 97 MG/DL (ref 65–99)
HBA1C MFR BLD: 5.9 %
HCT VFR BLD AUTO: 47.2 % (ref 36.5–49.3)
HDLC SERPL-MCNC: 59 MG/DL
HGB BLD-MCNC: 15.1 G/DL (ref 12–17)
IMM GRANULOCYTES # BLD AUTO: 0.02 THOUSAND/UL (ref 0–0.2)
IMM GRANULOCYTES NFR BLD AUTO: 0 % (ref 0–2)
LDLC SERPL CALC-MCNC: 65 MG/DL (ref 0–100)
LYMPHOCYTES # BLD AUTO: 2.06 THOUSANDS/ΜL (ref 0.6–4.47)
LYMPHOCYTES NFR BLD AUTO: 33 % (ref 14–44)
MCH RBC QN AUTO: 30.3 PG (ref 26.8–34.3)
MCHC RBC AUTO-ENTMCNC: 32 G/DL (ref 31.4–37.4)
MCV RBC AUTO: 95 FL (ref 82–98)
MONOCYTES # BLD AUTO: 0.52 THOUSAND/ΜL (ref 0.17–1.22)
MONOCYTES NFR BLD AUTO: 8 % (ref 4–12)
NEUTROPHILS # BLD AUTO: 3.43 THOUSANDS/ΜL (ref 1.85–7.62)
NEUTS SEG NFR BLD AUTO: 57 % (ref 43–75)
NONHDLC SERPL-MCNC: 79 MG/DL
NRBC BLD AUTO-RTO: 0 /100 WBCS
PLATELET # BLD AUTO: 168 THOUSANDS/UL (ref 149–390)
PMV BLD AUTO: 10.8 FL (ref 8.9–12.7)
POTASSIUM SERPL-SCNC: 4.2 MMOL/L (ref 3.5–5.3)
PROT SERPL-MCNC: 6.8 G/DL (ref 6.4–8.2)
RBC # BLD AUTO: 4.99 MILLION/UL (ref 3.88–5.62)
SODIUM SERPL-SCNC: 140 MMOL/L (ref 136–145)
TRIGL SERPL-MCNC: 72 MG/DL
TSH SERPL DL<=0.05 MIU/L-ACNC: 1 UIU/ML (ref 0.45–4.5)
WBC # BLD AUTO: 6.16 THOUSAND/UL (ref 4.31–10.16)

## 2022-04-21 PROCEDURE — 36415 COLL VENOUS BLD VENIPUNCTURE: CPT

## 2022-04-21 PROCEDURE — 80061 LIPID PANEL: CPT

## 2022-04-21 PROCEDURE — 80053 COMPREHEN METABOLIC PANEL: CPT

## 2022-04-21 PROCEDURE — 85025 COMPLETE CBC W/AUTO DIFF WBC: CPT

## 2022-04-21 PROCEDURE — 83036 HEMOGLOBIN GLYCOSYLATED A1C: CPT

## 2022-04-21 PROCEDURE — 84443 ASSAY THYROID STIM HORMONE: CPT

## 2022-04-29 ENCOUNTER — OFFICE VISIT (OUTPATIENT)
Dept: FAMILY MEDICINE CLINIC | Facility: CLINIC | Age: 77
End: 2022-04-29
Payer: COMMERCIAL

## 2022-04-29 VITALS
BODY MASS INDEX: 28.49 KG/M2 | SYSTOLIC BLOOD PRESSURE: 124 MMHG | HEIGHT: 70 IN | DIASTOLIC BLOOD PRESSURE: 68 MMHG | TEMPERATURE: 97.7 F | WEIGHT: 199 LBS

## 2022-04-29 DIAGNOSIS — I65.22 CAROTID STENOSIS, LEFT: ICD-10-CM

## 2022-04-29 DIAGNOSIS — I10 PRIMARY HYPERTENSION: ICD-10-CM

## 2022-04-29 DIAGNOSIS — Z23 ENCOUNTER FOR IMMUNIZATION: Primary | ICD-10-CM

## 2022-04-29 DIAGNOSIS — T46.4X5A COUGH DUE TO ACE INHIBITOR: ICD-10-CM

## 2022-04-29 DIAGNOSIS — I61.9 HEMORRHAGIC STROKE (HCC): ICD-10-CM

## 2022-04-29 DIAGNOSIS — I77.1 STRICTURE OF ARTERY (HCC): ICD-10-CM

## 2022-04-29 DIAGNOSIS — Z11.59 NEED FOR HEPATITIS C SCREENING TEST: ICD-10-CM

## 2022-04-29 DIAGNOSIS — G47.33 OBSTRUCTIVE SLEEP APNEA SYNDROME: ICD-10-CM

## 2022-04-29 DIAGNOSIS — R73.03 PREDIABETES: ICD-10-CM

## 2022-04-29 DIAGNOSIS — E78.2 MIXED HYPERLIPIDEMIA: ICD-10-CM

## 2022-04-29 DIAGNOSIS — R05.8 COUGH DUE TO ACE INHIBITOR: ICD-10-CM

## 2022-04-29 PROCEDURE — 99214 OFFICE O/P EST MOD 30 MIN: CPT | Performed by: FAMILY MEDICINE

## 2022-04-29 PROCEDURE — G0009 ADMIN PNEUMOCOCCAL VACCINE: HCPCS

## 2022-04-29 PROCEDURE — 90677 PCV20 VACCINE IM: CPT

## 2022-04-29 PROCEDURE — 1036F TOBACCO NON-USER: CPT | Performed by: FAMILY MEDICINE

## 2022-04-29 PROCEDURE — 1160F RVW MEDS BY RX/DR IN RCRD: CPT | Performed by: FAMILY MEDICINE

## 2022-04-29 RX ORDER — LOSARTAN POTASSIUM 25 MG/1
25 TABLET ORAL DAILY
Qty: 30 TABLET | Refills: 5 | Status: SHIPPED | OUTPATIENT
Start: 2022-04-29

## 2022-04-29 NOTE — PROGRESS NOTES
Assessment/Plan:    Primary hypertension  Switch from lisinopril to losartan 25 mg daily, amlodipine 10 mg daily  Recheck in the office in 3 months  Hemorrhagic stroke ([address] )  Seems to be making good progress  Keep blood pressure under good control, continue Plavix 75 mg daily  Mixed hyperlipidemia  Continue atorvastatin 40 mg daily  Will be due for lab in September    Prediabetes  HB A1c was 5 9, continue dietary discretion and physical activity  Obstructive sleep apnea syndrome  Home study is pending  Diagnoses and all orders for this visit:    Encounter for immunization  -     Pneumococcal Conjugate Vaccine 20-valent (PCV20); Future  -     Pneumococcal Conjugate Vaccine 20-valent (Pcv20)    Need for hepatitis C screening test  -     Hepatitis C Antibody ([facility]); Future    Primary hypertension  -     Comprehensive metabolic panel; Future  -     Lipid panel; Future  -     TSH, 3rd generation; Future  -     losartan (COZAAR) 25 mg tablet; Take 1 tablet (25 mg total) by mouth daily    Mixed hyperlipidemia  -     Comprehensive metabolic panel; Future  -     Lipid panel; Future  -     TSH, 3rd generation; Future    Prediabetes  -     Comprehensive metabolic panel; Future  -     Hemoglobin A1C; Future    Stricture of artery (HCC)    Hemorrhagic stroke (HCC)    Obstructive sleep apnea syndrome    Carotid stenosis, left    Cough due to ACE inhibitor  Comments:  Switched to losartan          Subjective:   Chief Complaint   Patient presents with    Hypertension    Hyperlipidemia     no refills needed           Patient ID: Marilyn Harvey is a 64 y o  male  He is here for follow-up on his hypertension, hyperlipidemia, history of stroke, and review recent lab work        The following portions of the patient's history were reviewed and updated as appropriate: allergies, current medications, past family history, past medical history, past social history, past surgical history and problem list     Review of Systems   Constitutional: Positive for fatigue  Negative for appetite change, chills, diaphoresis, fever and unexpected weight change  HENT: Negative for congestion, ear pain, hearing loss, nosebleeds, postnasal drip, rhinorrhea, sinus pressure, sore throat, tinnitus and trouble swallowing  Eyes: Negative for photophobia, pain, discharge, redness, itching and visual disturbance  Respiratory: Positive for cough ( dry, tickle cough multiple times through the day  Wonders if it could be any of his medications  )  Negative for chest tightness, shortness of breath and wheezing  Cardiovascular: Negative for chest pain, palpitations and leg swelling  Denies orthopnea , dyspnea on exertion   Gastrointestinal: Negative for abdominal distention, abdominal pain, blood in stool, constipation, diarrhea, nausea and vomiting  Endocrine: Negative  Genitourinary: Negative for difficulty urinating, dysuria, flank pain, frequency, hematuria and urgency  Denies nocturia , erectile dysfunction   Musculoskeletal: Negative for arthralgias, back pain, gait problem, joint swelling and myalgias  Skin: Negative for pallor, rash and wound  Denies skin lesions   Allergic/Immunologic: Negative for environmental allergies, food allergies and immunocompromised state  Neurological: Positive for speech difficulty (Improving) and numbness ( in his fingertips and this is improving)  Negative for dizziness, tremors, seizures, syncope, weakness and headaches  Hematological: Negative for adenopathy  Does not bruise/bleed easily  Psychiatric/Behavioral: Negative for behavioral problems, confusion, sleep disturbance and suicidal ideas  The patient is not nervous/anxious  Objective:      /68   Temp 97 7 °F (36 5 °C)   Ht 5' 10" (1 778 m)   Wt 90 3 kg (199 lb)   BMI 28 55 kg/m²          Physical Exam  Vitals and nursing note reviewed     Constitutional:       Appearance: He is well-developed  HENT:      Head: Normocephalic and atraumatic  Right Ear: Tympanic membrane and ear canal normal       Left Ear: Tympanic membrane and ear canal normal       Nose: Nose normal    Eyes:      Conjunctiva/sclera: Conjunctivae normal       Pupils: Pupils are equal, round, and reactive to light  Neck:      Thyroid: No thyromegaly  Vascular: No JVD  Trachea: No tracheal deviation  Cardiovascular:      Rate and Rhythm: Normal rate and regular rhythm  Heart sounds: No murmur heard  Pulmonary:      Effort: Pulmonary effort is normal       Breath sounds: Normal breath sounds  No wheezing or rales  Abdominal:      General: Bowel sounds are normal       Palpations: Abdomen is soft  There is no mass  Tenderness: There is no abdominal tenderness  There is no guarding or rebound  Musculoskeletal:         General: No tenderness or deformity  Cervical back: Normal range of motion and neck supple  Lymphadenopathy:      Cervical: No cervical adenopathy  Skin:     General: Skin is warm and dry  Findings: No lesion or rash  Nails: There is no clubbing  Neurological:      Mental Status: He is alert and oriented to person, place, and time  Cranial Nerves: No cranial nerve deficit  Motor: No abnormal muscle tone  Coordination: Coordination normal       Deep Tendon Reflexes: Reflexes are normal and symmetric   Reflexes normal    Psychiatric:         Judgment: Judgment normal

## 2022-04-29 NOTE — ASSESSMENT & PLAN NOTE
Seems to be making good progress  Keep blood pressure under good control, continue Plavix 75 mg daily

## 2022-04-29 NOTE — ASSESSMENT & PLAN NOTE
Switch from lisinopril to losartan 25 mg daily, amlodipine 10 mg daily  Recheck in the office in 3 months

## 2022-06-02 ENCOUNTER — OFFICE VISIT (OUTPATIENT)
Dept: FAMILY MEDICINE CLINIC | Facility: CLINIC | Age: 77
End: 2022-06-02
Payer: COMMERCIAL

## 2022-06-02 VITALS
HEIGHT: 70 IN | TEMPERATURE: 97.9 F | DIASTOLIC BLOOD PRESSURE: 68 MMHG | BODY MASS INDEX: 29.26 KG/M2 | SYSTOLIC BLOOD PRESSURE: 140 MMHG | WEIGHT: 204.4 LBS

## 2022-06-02 DIAGNOSIS — I61.9 HEMORRHAGIC STROKE (HCC): ICD-10-CM

## 2022-06-02 DIAGNOSIS — I63.81 LEFT SIDED LACUNAR INFARCTION (HCC): ICD-10-CM

## 2022-06-02 DIAGNOSIS — J31.0 CHRONIC RHINITIS: ICD-10-CM

## 2022-06-02 DIAGNOSIS — F32.9 REACTIVE DEPRESSION: Primary | ICD-10-CM

## 2022-06-02 PROCEDURE — 3725F SCREEN DEPRESSION PERFORMED: CPT | Performed by: FAMILY MEDICINE

## 2022-06-02 PROCEDURE — 1160F RVW MEDS BY RX/DR IN RCRD: CPT | Performed by: FAMILY MEDICINE

## 2022-06-02 PROCEDURE — 3077F SYST BP >= 140 MM HG: CPT | Performed by: FAMILY MEDICINE

## 2022-06-02 PROCEDURE — 3078F DIAST BP <80 MM HG: CPT | Performed by: FAMILY MEDICINE

## 2022-06-02 PROCEDURE — 1036F TOBACCO NON-USER: CPT | Performed by: FAMILY MEDICINE

## 2022-06-02 PROCEDURE — 99214 OFFICE O/P EST MOD 30 MIN: CPT | Performed by: FAMILY MEDICINE

## 2022-06-02 NOTE — ASSESSMENT & PLAN NOTE
Counseled the patient, discussed with his significant other  At this time, I do not think he needs medication, he needs to stay active and to recognize his physical limitations  Needs to be more open about his feelings, discuss things with his significant other and his son

## 2022-06-02 NOTE — PROGRESS NOTES
Assessment/Plan:    Reactive depression  Counseled the patient, discussed with his significant other  At this time, I do not think he needs medication, he needs to stay active and to recognize his physical limitations  Needs to be more open about his feelings, discuss things with his significant other and his son  Diagnoses and all orders for this visit:    Reactive depression    Hemorrhagic stroke ([facility] [address] )    Left sided lacunar infarction [facility])    Chronic rhinitis          Subjective:   Chief Complaint   Patient presents with    Depression          Patient ID: Sabine Clark is a 64 y o  male  He has been depressed lately a little bit  Last week was pretty bad, for several days he was not motivated and did not want to go to work with this son or be very social   Admits that he is frustrated that he can not do what he used to do physically and mentally  He gets tired more easily than he used to  Sounds like he was pushing himself in the heat, and may have gotten a little dehydrated as well  Reinforced to him the need to stay hydrated  The following portions of the patient's history were reviewed and updated as appropriate: allergies, current medications, past family history, past medical history, past social history, past surgical history and problem list     Review of Systems   Constitutional: Positive for fatigue  Negative for appetite change, chills, diaphoresis, fever and unexpected weight change  HENT: Negative for congestion, ear pain, hearing loss, nosebleeds, postnasal drip, rhinorrhea, sinus pressure, sore throat, tinnitus and trouble swallowing  Eyes: Negative for photophobia, pain, discharge, redness, itching and visual disturbance  Respiratory: Negative for cough, chest tightness, shortness of breath and wheezing  Cardiovascular: Negative for chest pain, palpitations and leg swelling          Denies orthopnea , dyspnea on exertion   Gastrointestinal: Negative for abdominal distention, abdominal pain, blood in stool, constipation, diarrhea, nausea and vomiting  Endocrine: Negative  Genitourinary: Negative for difficulty urinating, dysuria, flank pain, frequency, hematuria and urgency  Denies nocturia , erectile dysfunction   Musculoskeletal: Negative for arthralgias, back pain, gait problem, joint swelling and myalgias  Skin: Negative for pallor, rash and wound  Denies skin lesions   Allergic/Immunologic: Negative for environmental allergies, food allergies and immunocompromised state  Neurological: Positive for light-headedness (Definitely heat related, somewhat positional )  Negative for dizziness, tremors, seizures, syncope, speech difficulty, weakness, numbness and headaches  Hematological: Negative for adenopathy  Does not bruise/bleed easily  Psychiatric/Behavioral: Positive for dysphoric mood  Negative for behavioral problems, confusion, sleep disturbance and suicidal ideas  The patient is not nervous/anxious  Objective:      /68   Temp 97 9 °F (36 6 °C)   Ht 5' 10" (1 778 m)   Wt 92 7 kg (204 lb 6 4 oz)   BMI 29 33 kg/m²          Physical Exam  Vitals and nursing note reviewed  Constitutional:       Appearance: He is well-developed  HENT:      Head: Normocephalic and atraumatic  Right Ear: Tympanic membrane and ear canal normal       Left Ear: Tympanic membrane and ear canal normal       Nose: Nose normal    Eyes:      Conjunctiva/sclera: Conjunctivae normal       Pupils: Pupils are equal, round, and reactive to light  Neck:      Thyroid: No thyromegaly  Vascular: No JVD  Trachea: No tracheal deviation  Cardiovascular:      Rate and Rhythm: Normal rate and regular rhythm  Heart sounds: No murmur heard  Pulmonary:      Effort: Pulmonary effort is normal       Breath sounds: Normal breath sounds  No wheezing or rales  Abdominal:      General: Bowel sounds are normal       Palpations: Abdomen is soft  There is no mass  Tenderness: There is no abdominal tenderness  There is no guarding or rebound  Musculoskeletal:         General: No tenderness or deformity  Cervical back: Normal range of motion and neck supple  Lymphadenopathy:      Cervical: No cervical adenopathy  Skin:     General: Skin is warm and dry  Findings: No lesion or rash  Nails: There is no clubbing  Neurological:      Mental Status: He is alert and oriented to person, place, and time  Cranial Nerves: No cranial nerve deficit  Motor: No abnormal muscle tone  Coordination: Coordination normal       Deep Tendon Reflexes: Reflexes are normal and symmetric  Reflexes normal    Psychiatric:         Attention and Perception: Attention normal          Mood and Affect: Mood is depressed  Affect is flat  Affect is not tearful  Speech: Speech is delayed and tangential          Behavior: Behavior is slowed  Behavior is not agitated, aggressive or withdrawn  Behavior is cooperative  Thought Content: Thought content is not paranoid or delusional          Cognition and Memory: Cognition normal  He exhibits impaired recent memory           Judgment: Judgment normal

## 2022-06-16 ENCOUNTER — TELEPHONE (OUTPATIENT)
Dept: FAMILY MEDICINE CLINIC | Facility: CLINIC | Age: 77
End: 2022-06-16

## 2022-06-28 PROBLEM — H93.8X3 SENSATION OF FULLNESS IN BOTH EARS: Status: ACTIVE | Noted: 2022-06-28

## 2022-06-28 PROBLEM — H90.3 SENSORINEURAL HEARING LOSS (SNHL) OF BOTH EARS: Status: ACTIVE | Noted: 2022-06-28

## 2022-06-30 DIAGNOSIS — E78.5 HYPERLIPIDEMIA: ICD-10-CM

## 2022-06-30 DIAGNOSIS — I63.81 LEFT SIDED LACUNAR INFARCTION (HCC): ICD-10-CM

## 2022-06-30 DIAGNOSIS — I65.21 STENOSIS OF RIGHT CAROTID ARTERY: ICD-10-CM

## 2022-06-30 DIAGNOSIS — I61.9 HEMORRHAGIC STROKE (HCC): ICD-10-CM

## 2022-06-30 RX ORDER — CLOPIDOGREL BISULFATE 75 MG/1
TABLET ORAL
Qty: 90 TABLET | Refills: 1 | Status: SHIPPED | OUTPATIENT
Start: 2022-06-30

## 2022-06-30 RX ORDER — ATORVASTATIN CALCIUM 40 MG/1
TABLET, FILM COATED ORAL
Qty: 90 TABLET | Refills: 1 | Status: SHIPPED | OUTPATIENT
Start: 2022-06-30

## 2022-07-19 ENCOUNTER — RA CDI HCC (OUTPATIENT)
Dept: OTHER | Facility: HOSPITAL | Age: 77
End: 2022-07-19

## 2022-07-19 NOTE — PROGRESS NOTES
[facility]  coding opportunities       Chart reviewed, no opportunity found: CHART REVIEWED, NO OPPORTUNITY FOUND        Patients Insurance     Medicare Insurance: [employer] [facility] Ad[employer]age

## 2022-07-28 ENCOUNTER — APPOINTMENT (OUTPATIENT)
Dept: LAB | Facility: CLINIC | Age: 77
End: 2022-07-28
Payer: COMMERCIAL

## 2022-07-28 DIAGNOSIS — I10 PRIMARY HYPERTENSION: ICD-10-CM

## 2022-07-28 DIAGNOSIS — R73.03 PREDIABETES: ICD-10-CM

## 2022-07-28 DIAGNOSIS — E78.2 MIXED HYPERLIPIDEMIA: ICD-10-CM

## 2022-07-28 DIAGNOSIS — Z11.59 NEED FOR HEPATITIS C SCREENING TEST: ICD-10-CM

## 2022-07-28 LAB
ALBUMIN SERPL BCP-MCNC: 3.8 G/DL (ref 3.5–5)
ALP SERPL-CCNC: 93 U/L (ref 46–116)
ALT SERPL W P-5'-P-CCNC: 45 U/L (ref 12–78)
ANION GAP SERPL CALCULATED.3IONS-SCNC: 3 MMOL/L (ref 4–13)
AST SERPL W P-5'-P-CCNC: 19 U/L (ref 5–45)
BILIRUB SERPL-MCNC: 0.54 MG/DL (ref 0.2–1)
BUN SERPL-MCNC: 15 MG/DL (ref 5–25)
CALCIUM SERPL-MCNC: 9.6 MG/DL (ref 8.3–10.1)
CHLORIDE SERPL-SCNC: 109 MMOL/L (ref 96–108)
CHOLEST SERPL-MCNC: 134 MG/DL
CO2 SERPL-SCNC: 27 MMOL/L (ref 21–32)
CREAT SERPL-MCNC: 0.96 MG/DL (ref 0.6–1.3)
EST. AVERAGE GLUCOSE BLD GHB EST-MCNC: 120 MG/DL
GFR SERPL CREATININE-BSD FRML MDRD: 75 ML/MIN/1.73SQ M
GLUCOSE P FAST SERPL-MCNC: 94 MG/DL (ref 65–99)
HBA1C MFR BLD: 5.8 %
HCV AB SER QL: NORMAL
HDLC SERPL-MCNC: 50 MG/DL
LDLC SERPL CALC-MCNC: 71 MG/DL (ref 0–100)
NONHDLC SERPL-MCNC: 84 MG/DL
POTASSIUM SERPL-SCNC: 4.5 MMOL/L (ref 3.5–5.3)
PROT SERPL-MCNC: 7 G/DL (ref 6.4–8.4)
SODIUM SERPL-SCNC: 139 MMOL/L (ref 135–147)
TRIGL SERPL-MCNC: 65 MG/DL
TSH SERPL DL<=0.05 MIU/L-ACNC: 1.55 UIU/ML (ref 0.45–4.5)

## 2022-07-28 PROCEDURE — 80053 COMPREHEN METABOLIC PANEL: CPT

## 2022-07-28 PROCEDURE — 84443 ASSAY THYROID STIM HORMONE: CPT

## 2022-07-28 PROCEDURE — 36415 COLL VENOUS BLD VENIPUNCTURE: CPT

## 2022-07-28 PROCEDURE — 80061 LIPID PANEL: CPT

## 2022-07-28 PROCEDURE — 83036 HEMOGLOBIN GLYCOSYLATED A1C: CPT

## 2022-07-28 PROCEDURE — 86803 HEPATITIS C AB TEST: CPT

## 2022-07-29 ENCOUNTER — OFFICE VISIT (OUTPATIENT)
Dept: FAMILY MEDICINE CLINIC | Facility: CLINIC | Age: 77
End: 2022-07-29
Payer: COMMERCIAL

## 2022-07-29 VITALS
SYSTOLIC BLOOD PRESSURE: 124 MMHG | WEIGHT: 204 LBS | HEIGHT: 70 IN | TEMPERATURE: 97.1 F | BODY MASS INDEX: 29.2 KG/M2 | DIASTOLIC BLOOD PRESSURE: 60 MMHG

## 2022-07-29 DIAGNOSIS — Z00.00 MEDICARE ANNUAL WELLNESS VISIT, SUBSEQUENT: ICD-10-CM

## 2022-07-29 DIAGNOSIS — G47.33 OBSTRUCTIVE SLEEP APNEA SYNDROME: ICD-10-CM

## 2022-07-29 DIAGNOSIS — I63.81 LEFT SIDED LACUNAR INFARCTION (HCC): ICD-10-CM

## 2022-07-29 DIAGNOSIS — R73.03 PREDIABETES: ICD-10-CM

## 2022-07-29 DIAGNOSIS — E78.2 MIXED HYPERLIPIDEMIA: ICD-10-CM

## 2022-07-29 DIAGNOSIS — F32.9 REACTIVE DEPRESSION: ICD-10-CM

## 2022-07-29 DIAGNOSIS — Z66 DNR (DO NOT RESUSCITATE): ICD-10-CM

## 2022-07-29 DIAGNOSIS — I10 PRIMARY HYPERTENSION: Primary | ICD-10-CM

## 2022-07-29 PROCEDURE — 3288F FALL RISK ASSESSMENT DOCD: CPT | Performed by: FAMILY MEDICINE

## 2022-07-29 PROCEDURE — 3074F SYST BP LT 130 MM HG: CPT | Performed by: FAMILY MEDICINE

## 2022-07-29 PROCEDURE — G0439 PPPS, SUBSEQ VISIT: HCPCS | Performed by: FAMILY MEDICINE

## 2022-07-29 PROCEDURE — 1170F FXNL STATUS ASSESSED: CPT | Performed by: FAMILY MEDICINE

## 2022-07-29 PROCEDURE — 99214 OFFICE O/P EST MOD 30 MIN: CPT | Performed by: FAMILY MEDICINE

## 2022-07-29 PROCEDURE — 1160F RVW MEDS BY RX/DR IN RCRD: CPT | Performed by: FAMILY MEDICINE

## 2022-07-29 PROCEDURE — 3078F DIAST BP <80 MM HG: CPT | Performed by: FAMILY MEDICINE

## 2022-07-29 PROCEDURE — 3725F SCREEN DEPRESSION PERFORMED: CPT | Performed by: FAMILY MEDICINE

## 2022-07-29 PROCEDURE — 1125F AMNT PAIN NOTED PAIN PRSNT: CPT | Performed by: FAMILY MEDICINE

## 2022-07-29 NOTE — PATIENT INSTRUCTIONS

## 2022-07-29 NOTE — PROGRESS NOTES
Assessment and Plan:     Problem List Items Addressed This Visit        Respiratory    Obstructive sleep apnea syndrome       Cardiovascular and Mediastinum    Primary hypertension - Primary     Continue losartan 25 mg daily, amlodipine 10 mg daily, recheck in 3 months            Nervous and Auditory    Left sided lacunar infarction ([address] )     Keep blood pressure under good control, continue atorvastatin 40 mg daily, Plavix 75 mg daily  Other    DNR (do not resuscitate)    BMI 29 0-29 9,adult     Diet and exercise discussed  Mixed hyperlipidemia    Prediabetes     Diet discussed, will recheck in 6 months         Reactive depression     Counseled patient,  discussed ways to be more motivated  Will re-evaluate 3 months  Other Visit Diagnoses     Medicare annual wellness visit, subsequent            BMI Counseling: Body mass index is 29 27 kg/m²  The BMI is above normal  Nutrition recommendations include moderation in carbohydrate intake, increasing intake of lean protein and reducing intake of saturated and trans fat  Exercise recommendations include exercising 3-5 times per week  Rationale for BMI follow-up plan is due to patient being overweight or obese  Preventive health issues were discussed with patient, and age appropriate screening tests were ordered as noted in patient's After Visit Summary  Personalized health advice and appropriate referrals for health education or preventive services given if needed, as noted in patient's After Visit Summary  History of Present Illness:     Patient presents for a Medicare Wellness Visit    He is here for Medicare wellness exam as well as follow-up on his stroke, hypertension, hyperlipidemia, prediabetes, weight, depression, and sleep apnea  He he is doing well with sleep apnea  He seems to doing quite well from his stroke, 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    He is somewhat frustrated in his inability to continue to do physical things the way he used to  He voices depression over this, but he is not overwhelmed by it  Seems to be coping well, his significant other is very supportive  Patient Care Team:  Nick Talley DO as PCP - General (Family Medicine)  Julio Hayward DO (Vascular Surgery)     Review of Systems:     Review of Systems   Constitutional: Positive for fatigue  Negative for appetite change, chills, diaphoresis, fever and unexpected weight change  HENT: Negative for congestion, ear pain, hearing loss, nosebleeds, postnasal drip, rhinorrhea, sinus pressure, sore throat, tinnitus and trouble swallowing  Eyes: Negative for photophobia, pain, discharge, redness, itching and visual disturbance  Respiratory: Negative for cough, chest tightness, shortness of breath and wheezing  Cardiovascular: Negative for chest pain, palpitations and leg swelling  Denies orthopnea , dyspnea on exertion   Gastrointestinal: Negative for abdominal distention, abdominal pain, blood in stool, constipation, diarrhea, nausea and vomiting  Endocrine: Negative  Genitourinary: Negative for difficulty urinating, dysuria, flank pain, frequency, hematuria and urgency  Denies nocturia , erectile dysfunction   Musculoskeletal: Negative for arthralgias, back pain, gait problem, joint swelling and myalgias  Skin: Negative for pallor, rash and wound  Denies skin lesions   Allergic/Immunologic: Negative for environmental allergies, food allergies and immunocompromised state  Neurological: Negative for dizziness, tremors, seizures, syncope, speech difficulty, weakness, numbness and headaches  Hematological: Negative for adenopathy  Does not bruise/bleed easily  Psychiatric/Behavioral: Positive for decreased concentration  Negative for behavioral problems, confusion, sleep disturbance and suicidal ideas  The patient is not nervous/anxious           Problem List:     Patient Active Problem List   Diagnosis    History of prostate cancer    Right internal carotid occlusion    Left sided [address]unar infarction ([facility] [address] )    Premature atrial contraction    Carotid stenosis, left    DNR (do not resuscitate)    Actinic keratosis    BMI 29 0-29 9,adult    Hemorrhagic stroke ([facility] [address] )    Primary hypertension    Mixed hyperlipidemia    Prediabetes    Diastolic dysfunction    Chronic rhinitis    Obstructive sleep apnea syndrome    Reactive depression    Sensorineural hearing loss (SNHL) of both ears    Sensation of fullness in both ears      Past Medical and Surgical History:     Past Medical History:   Diagnosis Date    Cancer [facility])     prostate    Carotid stenosis     Hypertension      Past Surgical History:   Procedure Laterality Date    TONSILLECTOMY      TRANSURETHRAL RESECTION OF PROSTATE        Family History:     Family History   Problem Relation Age of Onset    Colon cancer Mother     No Known Problems Father     Leukemia Maternal Grandmother     Diabetes Maternal Grandmother     Alcohol abuse Maternal Grandfather     No Known Problems Paternal Grandmother     No Known Problems Paternal Grandfather       Social History:     Social History     Socioeconomic History    Marital status: /Civil Union     Spouse name: None    Number of children: None    Years of education: None    Highest education level: None   Occupational History    None   Tobacco Use    Smoking status: Former Smoker    Smokeless tobacco: Never Used   Vaping Use    Vaping Use: Never used   Substance and Sexual Activity    Alcohol use:  Yes     Alcohol/week: 4 0 standard drinks     Types: 4 Cans of beer per week     Comment: several days a week    Drug use: Never    Sexual activity: None   Other Topics Concern    None   Social History Narrative    None     Social Determinants of Health     Financial Resource Strain: Not on file   Food Insecurity: Not on file   Transportation Needs: Not on file Physical Activity: Not on file   Stress: Not on file   Social Connections: Not on file   Intimate Partner Violence: Not on file   Housing Stability: Not on file      Medications and Allergies:     Current Outpatient Medications   Medication Sig Dispense Refill    acetaminophen (TYLENOL) 325 mg tablet Take 650 mg by mouth every 6 (six) hours as needed for mild pain As needed      amLODIPine (NORVASC) 10 mg tablet TAKE 1 TABLET BY MOUTH EVERY DAY 90 tablet 1    atorvastatin (LIPITOR) 40 mg tablet TAKE 1 TABLET BY MOUTH DAILY WITH DINNER 90 tablet 1    clopidogrel (PLAVIX) 75 mg tablet TAKE 1 TABLET BY MOUTH EVERY DAY 90 tablet 1    fluticasone (FLONASE) 50 mcg/act nasal spray 1 spray into each nostril daily 16 g 5    losartan (COZAAR) 25 mg tablet Take 1 tablet (25 mg total) by mouth daily 30 tablet 5     No current facility-administered medications for this visit  Allergies   Allergen Reactions    Ace Inhibitors Cough      Immunizations:     Immunization History   Administered Date(s) Administered    COVID-19 MODERNA VACC 0 5 ML IM 01/20/2021, 02/15/2021, 11/23/2021    Influenza, high dose seasonal 0 7 mL 10/25/2021    Pneumococcal Conjugate Vaccine 20-valent (Pcv20), Polysace 04/29/2022    Tdap 11/17/2019      Health Maintenance:         Topic Date Due    Hepatitis C Screening  Completed         Topic Date Due    COVID-19 Vaccine (4 - Booster for Moderna series) 03/23/2022    Influenza Vaccine (1) 09/01/2022      Medicare Screening Tests and Risk Assessments:     Padmini Mcekon is here for his Subsequent Wellness visit  Last Medicare Wellness visit information reviewed, patient interviewed and updates made to the record today  Health Risk Assessment:   Patient rates overall health as fair  Patient feels that their physical health rating is same  Patient is dissatisfied with their life  Eyesight was rated as same  Hearing was rated as slightly worse   Patient feels that their emotional and mental health rating is slightly worse  Patients states they are often angry  Patient states they are always unusually tired/fatigued  Pain experienced in the last 7 days has been some  Patient's pain rating has been 3/10  Patient states that he has experienced no weight loss or gain in last 6 months  Depression Screening:   PHQ-9 Score: 16      Fall Risk Screening: In the past year, patient has experienced: no history of falling in past year      Home Safety:  Patient does not have trouble with stairs inside or outside of their home  Patient has working smoke alarms and has working carbon monoxide detector  Home safety hazards include: none  Nutrition:   Current diet is Regular  Medications:   Patient is currently taking over-the-counter supplements  OTC medications include: see medication list  Patient is able to manage medications  Activities of Daily Living (ADLs)/Instrumental Activities of Daily Living (IADLs):   Walk and transfer into and out of bed and chair?: Yes  Dress and groom yourself?: Yes    Bathe or shower yourself?: Yes    Feed yourself? Yes  Do your laundry/housekeeping?: Yes  Manage your money, pay your bills and track your expenses?: Yes  Make your own meals?: Yes    Do your own shopping?: Yes    Previous Hospitalizations:   Any hospitalizations or ED visits within the last 12 months?: Yes    How many hospitalizations have you had in the last year?: 1-2    Advance Care Planning:   Living will: Yes    Durable POA for healthcare:  Yes    Advanced directive: Yes    End of Life Decisions reviewed with patient: Yes    Provider agrees with end of life decisions: Yes      Comments: DNR    Cognitive Screening:   Provider or family/friend/caregiver concerned regarding cognition?: No    PREVENTIVE SCREENINGS      Cardiovascular Screening:    General: Screening Not Indicated and History Lipid Disorder      Diabetes Screening:     General: Screening Current      Colorectal Cancer Screening: General: Screening Not Indicated      Prostate Cancer Screening:    General: History Prostate Cancer and Screening Not Indicated      Osteoporosis Screening:    General: Screening Not Indicated      Abdominal Aortic Aneurysm (AAA) Screening:    Risk factors include: tobacco use        General: Screening Current      Lung Cancer Screening:     General: Screening Not Indicated      Hepatitis C Screening:    General: Screening Current    Screening, Brief Intervention, and Referral to Treatment (SBIRT)    Screening    Typical number of drinks in a week: 0    Single Item Drug Screening:  How often have you used an illegal drug (including marijuana) or a prescription medication for non-medical reasons in the past year? never    Single Item Drug Screen Score: 0  Interpretation: Negative screen for possible drug use disorder    Brief Intervention  Alcohol & drug use screenings were reviewed  No concerns regarding substance use disorder identified  Other Counseling Topics:   Car/seat belt/driving safety, skin self-exam, sunscreen and calcium and vitamin D intake and regular weightbearing exercise  No exam data present     Physical Exam:     /60   Temp (!) 97 1 °F (36 2 °C)   Ht 5' 10" (1 778 m)   Wt 92 5 kg (204 lb)   BMI 29 27 kg/m²     Physical Exam  Vitals and nursing note reviewed  Constitutional:       Appearance: He is well-developed  Comments: Overweight   HENT:      Head: Normocephalic and atraumatic  Right Ear: Tympanic membrane and ear canal normal  There is no impacted cerumen  Left Ear: Tympanic membrane and ear canal normal  There is no impacted cerumen  Mouth/Throat:      Mouth: Mucous membranes are moist       Pharynx: No oropharyngeal exudate or posterior oropharyngeal erythema  Eyes:      Conjunctiva/sclera: Conjunctivae normal    Cardiovascular:      Rate and Rhythm: Normal rate and regular rhythm  Heart sounds: No murmur heard    Pulmonary:      Effort: Pulmonary effort is normal  No respiratory distress  Breath sounds: Normal breath sounds  Abdominal:      General: Abdomen is flat  Palpations: Abdomen is soft  There is no mass  Tenderness: There is no abdominal tenderness  Musculoskeletal:      Cervical back: Neck supple  Skin:     General: Skin is warm and dry  Neurological:      General: No focal deficit present  Mental Status: He is alert  Psychiatric:         Mood and Affect: Mood normal          Behavior: Behavior normal          Thought Content:  Thought content normal          Judgment: Judgment normal           Jami Sevilla DO

## 2022-08-26 ENCOUNTER — HOSPITAL ENCOUNTER (OUTPATIENT)
Dept: SLEEP CENTER | Facility: CLINIC | Age: 77
Discharge: HOME | End: 2022-08-26
Payer: COMMERCIAL

## 2022-08-26 DIAGNOSIS — G47.33 OBSTRUCTIVE SLEEP APNEA SYNDROME: ICD-10-CM

## 2022-08-26 PROCEDURE — G0399 HOME SLEEP TEST/TYPE 3 PORTA: HCPCS

## 2022-08-27 NOTE — PROGRESS NOTES
Home Sleep Study Documentation    HOME STUDY DEVICE: Noxturnal yes                                           Chanel G3 no      Pre-Sleep Home Study:    Set-up and instructions performed by: Monika Tate    Technician performed demonstration for Patient: yes    Return demonstration performed by Patient: yes    Written instructions provided to Patient: yes    Patient signed consent form: yes        Post-Sleep Home Study:    Additional comments by Patient: None    Home Sleep Study Failed:no:    Failure reason: N/A    Reported or Detected: N/A    Scored by: LAVERN Clement, SAVANNAHGT

## 2022-08-30 ENCOUNTER — TELEPHONE (OUTPATIENT)
Dept: SLEEP CENTER | Facility: CLINIC | Age: 77
End: 2022-08-30

## 2022-08-30 ENCOUNTER — TELEPHONE (OUTPATIENT)
Dept: FAMILY MEDICINE CLINIC | Facility: CLINIC | Age: 77
End: 2022-08-30

## 2022-08-30 NOTE — TELEPHONE ENCOUNTER
----- Message from Berto Pacheco DO sent at 8/30/2022  1:59 PM EDT -----  Sleep study was mildly abnormal   I will reach out to Neurology to see if they think it is worthwhile pursuing it at this time

## 2022-08-31 ENCOUNTER — TELEPHONE (OUTPATIENT)
Dept: FAMILY MEDICINE CLINIC | Facility: CLINIC | Age: 77
End: 2022-08-31

## 2022-08-31 DIAGNOSIS — G47.33 OBSTRUCTIVE SLEEP APNEA: Primary | ICD-10-CM

## 2022-08-31 NOTE — TELEPHONE ENCOUNTER
----- Message from Tay Sumner DO sent at 8/30/2022  3:38 PM EDT -----  Patient will need to be set up for a titration study for CPAP  I will put orders in the system

## 2022-09-01 ENCOUNTER — TELEPHONE (OUTPATIENT)
Dept: SLEEP CENTER | Facility: CLINIC | Age: 77
End: 2022-09-01

## 2022-09-01 NOTE — TELEPHONE ENCOUNTER
Sleep study resulted and shows mild ARTURO  CPAP study ordered by PCP Dr Hilary Ramírez  Per study order, patient to follow up with sleep medicine  Called patient and advised of study results  Scheduled CPAP study 12/5/22 in [facility]  Instructions provided  Verbalized understanding  Scheduled consult with Dr Roverto Weinstein 10/25/22

## 2022-09-07 ENCOUNTER — TELEPHONE (OUTPATIENT)
Dept: SLEEP CENTER | Facility: CLINIC | Age: 77
End: 2022-09-07

## 2022-09-07 NOTE — TELEPHONE ENCOUNTER
----- Message from Skyler Gustafson MD sent at 9/6/2022  8:28 PM EDT -----  approved  ----- Message -----  From: Levon Avalos  Sent: 9/2/2022   8:49 AM EDT  To: Sleep Medicine [facility] Provider    This cpap sleep study needs approval      If approved please sign and return to clerical pool  If denied please include reasons why  Also provide alternative testing if warranted  Please sign and return to clerical pool

## 2022-09-14 ENCOUNTER — OFFICE VISIT (OUTPATIENT)
Dept: SLEEP CENTER | Facility: CLINIC | Age: 77
End: 2022-09-14
Payer: COMMERCIAL

## 2022-09-14 VITALS
SYSTOLIC BLOOD PRESSURE: 142 MMHG | WEIGHT: 203 LBS | BODY MASS INDEX: 29.06 KG/M2 | HEIGHT: 70 IN | HEART RATE: 87 BPM | DIASTOLIC BLOOD PRESSURE: 58 MMHG

## 2022-09-14 DIAGNOSIS — G47.52 SLEEP BEHAVIOR DISORDER, REM: ICD-10-CM

## 2022-09-14 DIAGNOSIS — G47.33 OBSTRUCTIVE SLEEP APNEA SYNDROME: Primary | ICD-10-CM

## 2022-09-14 PROCEDURE — 1160F RVW MEDS BY RX/DR IN RCRD: CPT | Performed by: NURSE PRACTITIONER

## 2022-09-14 PROCEDURE — 3077F SYST BP >= 140 MM HG: CPT | Performed by: NURSE PRACTITIONER

## 2022-09-14 PROCEDURE — 3078F DIAST BP <80 MM HG: CPT | Performed by: NURSE PRACTITIONER

## 2022-09-14 PROCEDURE — 99204 OFFICE O/P NEW MOD 45 MIN: CPT | Performed by: NURSE PRACTITIONER

## 2022-09-14 NOTE — PROGRESS NOTES
Consultation - [address] [facility], 1945, MRN: [id number]    9/14/2022        Reason for Consult / Principal Problem:    Mild Obstructive Sleep Apnea       Thank you for the opportunity of participating in the evaluation and care of this patient in the Sleep Clinic at [facility]  Subjective:     HPI: Ashok Landau is a 64y o  year old male  He presents with his wife for a consultation regarding concern of mild obstructive sleep apnea  He reported to Dr Connie Toussaint that he was waking up feeling tired all the time  A home sleep study was completed in August 2022  Testing confirmed mild obstructive sleep apnea  He mentions that he fell out of bed one week ago, when getting out of bed to urinate during the night  He states that he has some residual right sided weakness from a CVA and when getting up, fell onto the right side  Comorbid conditions:  HTN  Stroke  Depression  Review of Systems      Genitourinary difficulty with erection   Cardiology none   Gastrointestinal none   Neurology muscle weakness, numbness/tingling of an extremity, forgetfulness, poor concentration or confusion, , difficulty with memory and balance problems   Constitutional fatigue   Integumentary none   Psychiatry anxiety and mood change   Musculoskeletal joint pain, muscle aches, legs twitching/jerking and leg cramps   Pulmonary shortness of breath with activity and snoring   ENT ringing in ears   Endocrine none   Hematological none     Sleep Study Results: The patient completed a home sleep study on 8/29/22 and results are as follows: The patient had a total of 73 respiratory events made up of 7 obstructive apneas, 0 central apneas, 0 mixed apneas and 66 hypopneas resulting in a respiratory event index (ADARSH) of 10 6        The lowest SPO2 recorded is 87% and 4% of the study was spent with saturations below 90%       The snore index was 11 5%      IMPRESSION:  Mild obstructive sleep apnea - Limitations of home sleep testing compromises accuracy  Employment:  He is currently retired  He previously worked as an   He mainly worked daytime hours  Sleep Schedule:       Bedtime:  9:00pm      Latency:  Within one hour, while watching TV      Wakeup time:  Between 5-6:00am    Awakenings:       Frequency:  One time per night      Causes: For urination      Duration:  Returns to sleep without difficulty    Daytime Sleepiness / Inappropriate Sleep:       Most severe:  He does not feel refreshed when awakening  He also feels sleepy around 2:00pm       Naps :  2-3 times per week      Time:  afternoon      Duration:  One hour, naps are refreshing       Inappropriate drowsiness / sleep:  He may doze in the afternoon while watching TV, "if the show is boring"    Snoring:  He snores loudly with gasping and choking    Apnea:  He has had brief periods of witnessed apnea    Change in Weight:  He unintentionally lost 20 lbs after his stroke, but regained  Restless Leg Syndrome:  He doesn't notice any clinical symptoms consistent with this diagnosis, however, his wife reports frequent limb movements and some possible dream enactment  Other Complaints:  His wife reports some talking in his sleep with movement of his arms that appear that he is acting out a dream   There can be some yelling at times  Symptoms do not occur nightly, but approximately once per week  No reports of sleep walking, sleep paralysis or hallucinations surrounding sleep  He does not awaken with headaches  No reports of bruxism  Social History:      Caffeine:  Daily - none at night       Tobacco:   reports that he has quit smoking   He has never used smokeless tobacco      E-cig/Vaping:    E-Cigarette/Vaping    E-Cigarette Use Never User       E-Cigarette/Vaping Substances         Alcohol:   reports current alcohol use of about 4 0 standard drinks of alcohol per week  6 beers in one year      Drugs:   reports no history of drug use  The review of systems and following portions of the patient's history were reviewed and updated as appropriate: allergies, current medications, past family history, past medical history, past social history, past surgical history and problem list         Objective:       Vitals:    09/14/22 1204 09/14/22 1210   BP: 160/72 142/58   BP Location: Left arm Left arm   Patient Position: Sitting Sitting   Cuff Size: Adult Adult   Pulse: 82 87   Weight: 92 1 kg (203 lb)    Height: 5' 10" (1 778 m)      Body mass index is 29 13 kg/m²  Neck Circumference: 16 75  [place] Sleepiness Scale:  Total score: 7      Current Outpatient Medications:     acetaminophen (TYLENOL) 325 mg tablet, Take 650 mg by mouth every 6 (six) hours as needed for mild pain As needed, Disp: , Rfl:     amLODIPine (NORVASC) 10 mg tablet, TAKE 1 TABLET BY MOUTH EVERY DAY, Disp: 90 tablet, Rfl: 1    atorvastatin (LIPITOR) 40 mg tablet, TAKE 1 TABLET BY MOUTH DAILY WITH DINNER, Disp: 90 tablet, Rfl: 1    clopidogrel (PLAVIX) 75 mg tablet, TAKE 1 TABLET BY MOUTH EVERY DAY, Disp: 90 tablet, Rfl: 1    fluticasone (FLONASE) 50 mcg/act nasal spray, 1 spray into each nostril daily, Disp: 16 g, Rfl: 5    losartan (COZAAR) 25 mg tablet, Take 1 tablet (25 mg total) by mouth daily, Disp: 30 tablet, Rfl: 5    Physical Exam  General Appearance:   Alert, cooperative, no distress, appears stated age, overweight     Head:   Normocephalic, without obvious abnormality, atraumatic     Eyes:   PERRL, conjunctiva/corneas clear          Nose:  Nares normal, septum deviated, mucosa normal, no drainage or sinus tenderness           Throat:  Lips, teeth and gums normal; tongue normal in size and midline in position; mucosa moist with low-lying soft palatal tissue, uvula normal, tonsils absent, Mallampati class 3       Neck:  Supple, symmetrical, trachea midline, no adenopathy; no thyromegaly noted, no carotid bruit or JVD     Lungs:      Clear to auscultation bilaterally, respirations unlabored     Heart:   Irregular rate and rhythm, S1 and S2 normal, no murmur, rub or gallop       Extremities:  Extremities normal, atraumatic, no cyanosis or edema       Skin:  Skin color, texture, turgor normal, no rashes or lesions       Neurologic:  Slight right sided weakness  Occasional difficulty with word-finding  ASSESSMENT / PLAN     1  Obstructive sleep apnea syndrome  RBD CPAP Study   2  Sleep behavior disorder, REM  RBD CPAP Study         Counseling / Coordination of Care  Total clinic time spent today 60 minutes  Greater than 50% of total time was spent with the patient and / or family counseling and / or coordination of care  A description of the counseling / coordination of care:     diagnostic results, instructions for management, risk factor reductions, prognosis, patient and family education, impressions, risks and benefits of treatment options and importance of compliance with treatment    I reviewed the recent test results with the patient  We talked about the abnormal findings, including those consistent with Obstructive Sleep Apnea  We then discussed how the these are categorized as mild, moderate or severe  We also covered the medical comorbidities associated with untreated Obstructive Sleep Apnea including, hypertension, cardiac arrythmia, myocardial infarction and stroke  I explained how the risk of these conditions increases with the severity of the test results  I also spoke in some detail about the most common treatment options including weight loss, nasal PAP, mandibular advancement devices and uvulopalatopharyngoplasty (UPPP)  I answered all questions posed to me and gave my opinion regarding the best options for treatment based on the patient and their level of disease  In this case he chose to begin treatment using PAP therapy    A CPAP titration with extended EEG montage to evaluate for possible REM behavior disorder has been ordered  The patient will return 31-91 days after beginning use of PAP therapy for a review of compliance data collected after beginning treatment  We will discuss this data and talk about any problems that may prevent successful treatment of Obstructive Sleep Apnea  Changes will be made in treatment parameters or interface devices in order to improve his ability to continue using PAP to maintain upper airway patency during sleep  The following instructions have been given to the patient today:    Patient Instructions   1  Schedule CPAP titration study  2  Schedule compliance follow up 31-91 days after set up  Nursing Support:  When: Monday through Friday 7A-5PM except holidays  Where: Our direct line is [phone]  If you are having a true emergency please call 911  In the event that the line is busy or it is after hours please leave a voice message and we will return your call  Please speak clearly, leaving your full name, birth date, best number to reach you and the reason for your call  Medication refills: We will need the name of the medication, the dosage, the ordering provider, whether you get a 30 or 90 day refill, and the pharmacy name and address  Medications will be ordered by the provider only  Nurses cannot call in prescriptions  Please allow 7 days for medication refills  Physician requested updates: If your provider requested that you call with an update after starting medication, please be ready to provide us the medication and dosage, what time you take your medication, the time you attempt to fall asleep, time you fall asleep, when you wake up, and what time you get out of bed  Sleep Study Results: We will contact you with sleep study results and/or next steps after the physician has reviewed your testing        Hannah Acosta, [address] [place] [address]      Portions of the record may have been created with voice recognition software  Occasional wrong word or "sound a like" substitutions may have occurred due to the inherent limitations of voice recognition software  Read the chart carefully and recognize, using context, where substitutions have occurred

## 2022-09-14 NOTE — PATIENT INSTRUCTIONS
Schedule CPAP titration study  Schedule compliance follow up 31-91 days after set up  Nursing Support:  When: Monday through Friday 7A-5PM except holidays  Where: Our direct line is [phone]  If you are having a true emergency please call 911  In the event that the line is busy or it is after hours please leave a voice message and we will return your call  Please speak clearly, leaving your full name, birth date, best number to reach you and the reason for your call  Medication refills: We will need the name of the medication, the dosage, the ordering provider, whether you get a 30 or 90 day refill, and the pharmacy name and address  Medications will be ordered by the provider only  Nurses cannot call in prescriptions  Please allow 7 days for medication refills  Physician requested updates: If your provider requested that you call with an update after starting medication, please be ready to provide us the medication and dosage, what time you take your medication, the time you attempt to fall asleep, time you fall asleep, when you wake up, and what time you get out of bed  Sleep Study Results: We will contact you with sleep study results and/or next steps after the physician has reviewed your testing

## 2022-10-02 ENCOUNTER — HOSPITAL ENCOUNTER (OUTPATIENT)
Dept: SLEEP CENTER | Facility: CLINIC | Age: 77
Discharge: HOME | End: 2022-10-02
Payer: COMMERCIAL

## 2022-10-02 DIAGNOSIS — G47.52 SLEEP BEHAVIOR DISORDER, REM: ICD-10-CM

## 2022-10-02 DIAGNOSIS — G47.33 OBSTRUCTIVE SLEEP APNEA SYNDROME: ICD-10-CM

## 2022-10-02 PROCEDURE — 95811 POLYSOM 6/>YRS CPAP 4/> PARM: CPT

## 2022-10-02 PROCEDURE — 95811 POLYSOM 6/>YRS CPAP 4/> PARM: CPT | Performed by: INTERNAL MEDICINE

## 2022-10-03 NOTE — PROGRESS NOTES
Sleep Study Documentation    Pre-Sleep Study       Sleep testing procedure explained to patient:YES    Patient napped prior to study:NO    Caffeine:Dayshift worker after 12PM   Caffeine use:YES- soda  12 to 26 ounces    Alcohol:Dayshift workers after 5PM: Alcohol use:NO    Typical day for patient:YES       Study Documentation    Sleep Study Indications:  ARTURO-diagnosed home study at [facility]  Sleep Study: Treatment   Optimal PAP pressure:  13 cm H2O  Leak:Medium  Snore:Eliminated  REM Obtained:yes  Supplemental O2: no    Minimum SaO2  81 %  Baseline SaO2  94 1 %  PAP mask tried (list all) Medium ResMed AirFit N20 Nasal Mask  Medium ResMed AirFit F20 Full Face Mask with no added humidity  PAP mask choice (final) Medium ResMed F20 Full Face  PAP mask type:full face  PAP pressure at which snoring was eliminated  5 cm H2O  Minimum SaO2 at final PAP pressure  92 %  Mode of Therapy:CPAP  ETCO2:No  CPAP changed to BiPAP:No    Mode of Therapy:CPAP    EKG abnormalities: no     EEG abnormalities: yes:  ECG artifact throughout study  Averaged Ms  Sleep Study Recorded < 2 hours: N/A    Sleep Study Recorded > 2 hours but incomplete study: N/A    Sleep Study Recorded 6 hours but no sleep obtained: NO          Post-Sleep Study    Medication used at bedtime or during sleep study:NO    Patient reports time it took to fall asleep:less than 20 minutes    Patient reports waking up during study:1 to 2 times  Patient reports returning to sleep without difficulty  Patient reports sleeping 4 to 6 hours with dreaming  Patient reports sleep during study:typical    Patient rated sleepiness: Not sleepy or tired    PAP treatment:yes: Post PAP treatment patient reports feeling unchanged and would wear PAP mask at home

## 2022-10-05 ENCOUNTER — TELEPHONE (OUTPATIENT)
Dept: SLEEP CENTER | Facility: CLINIC | Age: 77
End: 2022-10-05

## 2022-10-05 DIAGNOSIS — G47.33 OBSTRUCTIVE SLEEP APNEA SYNDROME: Primary | ICD-10-CM

## 2022-10-05 NOTE — TELEPHONE ENCOUNTER
----- Message from Phil Maldonado, [address]  sent at 10/5/2022 11:12 AM EDT -----  CPAP titration study completed  Equipment will be ordered  There was an abnormality on the EKG and a follow up 12 lead is recommended  I messaged his PCP and they will contact the patient to schedule it in the office  Patient to be scheduled for set up of CPAP equipment, followed by compliance follow up 31-91 days after the set up  Results of the study will be reviewed in more detail at his compliance follow up

## 2022-10-05 NOTE — TELEPHONE ENCOUNTER
Called patient  Left message that CPAP titration study is resulted and Carmen Dee has placed an order for CPAP  Provided patient with nurse line number to call ball and schedule DME setup appointment  Patient has compliance follow-up with BAINCA Mcdonald 12/1/22 in the [facility] office

## 2022-10-07 DIAGNOSIS — E78.2 MIXED HYPERLIPIDEMIA: ICD-10-CM

## 2022-10-07 DIAGNOSIS — R94.31 EKG, ABNORMAL: Primary | ICD-10-CM

## 2022-10-07 DIAGNOSIS — I10 PRIMARY HYPERTENSION: ICD-10-CM

## 2022-10-11 ENCOUNTER — OFFICE VISIT (OUTPATIENT)
Dept: NEUROLOGY | Facility: CLINIC | Age: 77
End: 2022-10-11
Payer: COMMERCIAL

## 2022-10-11 VITALS
BODY MASS INDEX: 28.59 KG/M2 | WEIGHT: 199.7 LBS | DIASTOLIC BLOOD PRESSURE: 72 MMHG | HEART RATE: 75 BPM | SYSTOLIC BLOOD PRESSURE: 133 MMHG | HEIGHT: 70 IN | TEMPERATURE: 97.6 F

## 2022-10-11 DIAGNOSIS — F06.31 DEPRESSION DUE TO OLD STROKE: ICD-10-CM

## 2022-10-11 DIAGNOSIS — Z86.73 HISTORY OF STROKE: Primary | ICD-10-CM

## 2022-10-11 DIAGNOSIS — I65.21 RIGHT INTERNAL CAROTID OCCLUSION: ICD-10-CM

## 2022-10-11 DIAGNOSIS — I10 PRIMARY HYPERTENSION: ICD-10-CM

## 2022-10-11 DIAGNOSIS — R73.03 PREDIABETES: ICD-10-CM

## 2022-10-11 DIAGNOSIS — I65.22 CAROTID STENOSIS, LEFT: ICD-10-CM

## 2022-10-11 DIAGNOSIS — G47.33 OBSTRUCTIVE SLEEP APNEA SYNDROME: ICD-10-CM

## 2022-10-11 DIAGNOSIS — I69.398 DEPRESSION DUE TO OLD STROKE: ICD-10-CM

## 2022-10-11 PROBLEM — I63.81 LEFT SIDED LACUNAR INFARCTION (HCC): Status: RESOLVED | Noted: 2020-12-30 | Resolved: 2022-10-11

## 2022-10-11 PROCEDURE — 99214 OFFICE O/P EST MOD 30 MIN: CPT | Performed by: PSYCHIATRY & NEUROLOGY

## 2022-10-11 RX ORDER — ESCITALOPRAM OXALATE 10 MG/1
TABLET ORAL
Qty: 30 TABLET | Refills: 3 | Status: SHIPPED | OUTPATIENT
Start: 2022-10-11

## 2022-10-11 NOTE — PATIENT INSTRUCTIONS
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 as well  This is quite typical for people who have experienced a significant stroke  His neurologic exam is reasonably reassuring in the office today  - for ongoing stroke prevention should continue his combination of Plavix, Lipitor, and appropriate blood pressure and glycemic control  -we will defer to his primary care team for monitoring of his cholesterol panel and blood sugar numbers   -he should check his blood pressure at home once per day for a week to make sure that the numbers are typically less than 130/80  As long as they are he can then check intermittently, perhaps a few times per week  If they are frequently higher than that or much higher than his goal number he should reach out to his primary care team   -he is due to have his carotid arteries re-evaluated with a Doppler ultrasound and I will provide him that prescription today   -as an initial attempt to treat post stroke depression we will use Lexapro 10 mg tablets  He will start with 1/2 tablet for the 1st week  If he notices no change of any kind he can increase to 1 full tablet thereafter  If he feels better on 1/2 tablet or feels like there might be some side effects he can stay at that dose for longer  I would like for him to contact me in no more than 1 month to report on his progress on this medicine    I will plan for him to return to the office in 8 months time to see either myself or one of the KITA's but would be happy to see him sooner if the need should arise    If he has any symptoms concerning for TIA or stroke including sudden painless loss of vision or double vision, difficulty speaking or swallowing, vertigo/room spinning that does not quickly resolve, or weakness/numbness/loss of coordination affecting 1 side of the face or body he should proceed by ambulance to the nearest emergency room immediately

## 2022-10-11 NOTE — PROGRESS NOTES
Aviva Harris is a 77year old male here with his wife for one year follow up of a left thalamic hemorrhagic stroke  He reports no new stroke like symptoms and states he is doing well overall  He reports some continued diminished sensation in his right fingertips, right toes, occasional word-finding difficulties, and difficulties with word-identification/repetition  We discussed that while it would be atypical for the location of his stroke to directly affect his hearing, that it may contribute to word-identification/repetition difficulties via semantic aphasia  the has been taking his medications as prescribed, and has reported no concerns with easy bruising/bleeding  He states that his appetite has been good overall  [employer] used to work as an  prior to his stroke but has not worked since then  He reports increased feelings of depression and anxiety since his stroke  Specific changes include: a shorter temper than previously, periods of dwelling on what he can no longer do  He has also had some issues with sleeping during the day  He states that if he's actively working on something he has the energy/attention to do so, but if he is sitting down and watching TV he may unintentionally doze off during the day  He states that when he unintentionally sleeps in the day, he has increased difficulty falling asleep at night  He does have a history of obstructive sleep apnea and had a sleep study done on 10/02/2022 but has not yet received his CPAP machine, intending to do so next week  We discussed that his daytime sleepiness could be due to either/both his post-stroke depression/anxiety as well as his sleep apnea  He also stated that he has had worsening of existing eustachian tube dysfunction since his stroke  We discussed that he may also be experiencing post-stroke hypervigilance, making him more aware of minor sensations, which resonated with him and his wife   We discussed the risks, benefits, and side effects of treating his symptoms of anxiety and depression with escitalopram and he was agreeable with doing so  We also discussed that he was due soon for a 6 month follow-up VAS carotid for left carotid stenosis  Exam:  At the time of examination, Magui Kumar was awake, alert, and in no apparent distress  His affect was bright and appropriate  He is a good historian, though required repetition on occasion likely due to poor hearing  He is not dysarthric  Pupils were equal, round, and reactive to light  Extraocular movements were intact with no nystagmus  His face was symmetric  Equal sensation in the V1-V3 distributions bilaterally  Palate raise was symmetric and uvula was midline  Tongue protrusion was midline and shoulder shrug was symmetric  Testing of right deltoid was deferred due to pain  There was 4/5 strength of right upper extremity extension limited by pain  Otherwise, strength was 5/5 throughout the upper and lower extremities bilaterally  Sensation was diminished to light touch and  in the right fingertips, otherwise intact and symmetric  He was able to rise without assistance and his gait was stable

## 2022-10-11 NOTE — PROGRESS NOTES
Patient ID: Solomon Hassan is a 76 y o  male  Assessment/Plan:  Patient Instructions   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 as well  This is quite typical for people who have experienced a significant stroke  His neurologic exam is reasonably reassuring in the office today  - for ongoing stroke prevention should continue his combination of Plavix, Lipitor, and appropriate blood pressure and glycemic control  -we will defer to his primary care team for monitoring of his cholesterol panel and blood sugar numbers   -he should check his blood pressure at home once per day for a week to make sure that the numbers are typically less than 130/80  As long as they are he can then check intermittently, perhaps a few times per week  If they are frequently higher than that or much higher than his goal number he should reach out to his primary care team   -he is due to have his carotid arteries re-evaluated with a Doppler ultrasound and I will provide him that prescription today   -as an initial attempt to treat post stroke depression we will use Lexapro 10 mg tablets  He will start with 1/2 tablet for the 1st week  If he notices no change of any kind he can increase to 1 full tablet thereafter  If he feels better on 1/2 tablet or feels like there might be some side effects he can stay at that dose for longer  I would like for him to contact me in no more than 1 month to report on his progress on this medicine    I will plan for him to return to the office in 8 months time to see either myself or one of the KITA's but would be happy to see him sooner if the need should arise    If he has any symptoms concerning for TIA or stroke including sudden painless loss of vision or double vision, difficulty speaking or swallowing, vertigo/room spinning that does not quickly resolve, or weakness/numbness/loss of coordination affecting 1 side of the face or body he should proceed by ambulance to the nearest emergency room immediately  Diagnoses and all orders for this visit:    thalamic hemorrhage history of lacunar stroke    Obstructive sleep apnea syndrome    Right internal carotid occlusion  -     VAS carotid complete study; Future    Carotid stenosis, left  -     VAS carotid complete study; Future    Primary hypertension    Prediabetes    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 no    Are you taking all of your medications as prescribed? Yes    Any side effects including bleeding/bruising? No    I agree with the HPI as documented by Yudelka Petty MS3 which reflects my additions and edits  I directly interviewed the patient and confirmed key aspects and provided the counseling  Celestina Mendoza is a 77year old male here with his wife for one year follow up of a left thalamic hemorrhagic stroke  He reports no new stroke like symptoms and states he is doing well overall  He reports some continued diminished sensation in his right fingertips, right toes, occasional word-finding difficulties, and difficulties with word-identification/repetition  We discussed that while it would be atypical for the location of his stroke to directly affect his hearing, that it may contribute to word-identification/repetition difficulties via semantic aphasia   the has been taking his medications as prescribed, and has reported no concerns with easy bruising/bleeding  He states that his appetite has been good overall       Ector Huang used to work as an  prior to his stroke but has not worked since then  He reports increased feelings of depression and anxiety since his stroke  Specific changes include: a shorter temper than previously, periods of dwelling on what he can no longer do  He has also had some issues with sleeping during the day  He states that if he's actively working on something he has the energy/attention to do so, but if he is sitting down and watching TV he may unintentionally doze off during the day  He states that when he unintentionally sleeps in the day, he has increased difficulty falling asleep at night  He does have a history of obstructive sleep apnea and had a sleep study done on 10/02/2022 but has not yet received his CPAP machine, intending to do so next week  We discussed that his daytime sleepiness could be due to either/both his post-stroke depression/anxiety as well as his sleep apnea  He also stated that he has had worsening of existing eustachian tube dysfunction since his stroke  We discussed that he may also be experiencing post-stroke hypervigilance, making him more aware of minor sensations, which resonated with him and his wife  We discussed the risks, benefits, and side effects of treating his symptoms of anxiety and depression with escitalopram and he was agreeable with doing so   We also discussed that he was due soon for a 6 month follow-up VAS carotid for left carotid stenosis          Past Medical History:   Diagnosis Date   • Cancer [facility])     prostate   • Carotid stenosis    • Hypertension    • Left sided lacunar infarction [facility]) 12/30/2020       Social History     Socioeconomic History   • Marital status: /Civil Union     Spouse name: None   • Number of children: None   • Years of education: None   • Highest education level: None   Occupational History   • None Tobacco Use   • Smoking status: Former Smoker   • Smokeless tobacco: Never Used   Vaping Use   • Vaping Use: Never used   Substance and Sexual Activity   • Alcohol use: Yes     Alcohol/week: 4 0 standard drinks     Types: 4 Cans of beer per week     Comment: several days a week   • Drug use: Never   • Sexual activity: None   Other Topics Concern   • None   Social History Narrative   • None     Social Determinants of Health     Financial Resource Strain: Not on file   Food Insecurity: Not on file   Transportation Needs: Not on file   Physical Activity: Not on file   Stress: Not on file   Social Connections: Not on file   Intimate Partner Violence: Not on file   Housing Stability: Not on file         Current Outpatient Medications:   •  acetaminophen (TYLENOL) 325 mg tablet, Take 650 mg by mouth every 6 (six) hours as needed for mild pain As needed, Disp: , Rfl:   •  amLODIPine (NORVASC) 10 mg tablet, TAKE 1 TABLET BY MOUTH EVERY DAY, Disp: 90 tablet, Rfl: 1  •  atorvastatin (LIPITOR) 40 mg tablet, TAKE 1 TABLET BY MOUTH DAILY WITH DINNER, Disp: 90 tablet, Rfl: 1  •  clopidogrel (PLAVIX) 75 mg tablet, TAKE 1 TABLET BY MOUTH EVERY DAY, Disp: 90 tablet, Rfl: 1  •  escitalopram (Lexapro) 10 mg tablet, 1/2 tab daily for 1 week, if no side effects increase to 1 tab daily, Disp: 30 tablet, Rfl: 3  •  fluticasone (FLONASE) 50 mcg/act nasal spray, 1 spray into each nostril daily, Disp: 16 g, Rfl: 5  •  losartan (COZAAR) 25 mg tablet, TAKE 1 TABLET (25 MG TOTAL) BY MOUTH DAILY  , Disp: 90 tablet, Rfl: 1    Allergies   Allergen Reactions   • Ace Inhibitors Cough             Objective:    Blood pressure 133/72, pulse 75, temperature 97 6 °F (36 4 °C), temperature source Temporal, height 5' 10" (1 778 m), weight 90 6 kg (199 lb 11 2 oz)  Physical Exam    Neurological Exam    I was present for and directly observed the exam as performed by Batsheva Tijerina MS3 and agree with his documented findings      At the time of examination, Nahum Castellanos was awake, alert, and in no apparent distress  His affect was bright and appropriate  He is a good historian, though required repetition on occasion likely due to poor hearing  He is not dysarthric  Pupils were equal, round, and reactive to light  Extraocular movements were intact with no nystagmus  His face was symmetric  Equal sensation in the V1-V3 distributions bilaterally  Palate raise was symmetric and uvula was midline  Tongue protrusion was midline and shoulder shrug was symmetric  Testing of right deltoid was deferred due to pain  There was 4/5 strength of right upper extremity extension limited by pain  Otherwise, strength was 5/5 throughout the upper and lower extremities bilaterally  Sensation was diminished to light touch and  in the right fingertips, otherwise intact and symmetric  He was able to rise without assistance and his gait was stable  ROS:    Review of Systems   Constitutional: Negative  Negative for appetite change and fever  HENT: Negative  Negative for hearing loss, tinnitus, trouble swallowing and voice change  Eyes: Negative for photophobia, pain and visual disturbance  Respiratory: Negative  Negative for shortness of breath  Cardiovascular: Negative  Negative for palpitations  Gastrointestinal: Negative  Negative for nausea and vomiting  Endocrine: Negative  Negative for cold intolerance  Genitourinary: Negative  Negative for dysuria, frequency and urgency  Musculoskeletal: Positive for neck pain (tightness in the back of the neck)  Negative for gait problem and myalgias  Skin: Negative  Negative for rash  Neurological: Positive for speech difficulty (occasional slurred speech), weakness (overall weakness), numbness (toes and right hand) and headaches (dull daily Headache)  Negative for dizziness, tremors, seizures, syncope, facial asymmetry and light-headedness  Hematological: Negative  Does not bruise/bleed easily     Psychiatric/Behavioral: Negative for confusion, hallucinations and sleep disturbance  Memory concerns       Reviewed ROS as entered by medical assistant

## 2022-10-18 NOTE — TELEPHONE ENCOUNTER
Patient is scheduled for DME set up 10/31/22 in [facility]  Rx for CPAP and clinical information sent to [facility] via [place]

## 2022-10-19 LAB
DME PARACHUTE DELIVERY DATE REQUESTED: NORMAL
DME PARACHUTE DELIVERY NOTE: NORMAL
DME PARACHUTE ITEM DESCRIPTION: NORMAL
DME PARACHUTE ORDER STATUS: NORMAL
DME PARACHUTE SUPPLIER NAME: NORMAL
DME PARACHUTE SUPPLIER PHONE: NORMAL

## 2022-10-20 ENCOUNTER — TELEPHONE (OUTPATIENT)
Dept: NEUROLOGY | Facility: CLINIC | Age: 77
End: 2022-10-20

## 2022-10-20 NOTE — TELEPHONE ENCOUNTER
Patient's spouse would like to speak with Dr Dennis Sickle nurse about questions regarding his medicine   Please call back [phone]

## 2022-10-28 LAB
DME PARACHUTE DELIVERY DATE EXPECTED: NORMAL
DME PARACHUTE DELIVERY DATE REQUESTED: NORMAL
DME PARACHUTE DELIVERY NOTE: NORMAL
DME PARACHUTE ITEM DESCRIPTION: NORMAL
DME PARACHUTE ORDER STATUS: NORMAL
DME PARACHUTE SUPPLIER NAME: NORMAL
DME PARACHUTE SUPPLIER PHONE: NORMAL

## 2022-10-31 ENCOUNTER — OFFICE VISIT (OUTPATIENT)
Dept: FAMILY MEDICINE CLINIC | Facility: CLINIC | Age: 77
End: 2022-10-31

## 2022-10-31 VITALS
DIASTOLIC BLOOD PRESSURE: 72 MMHG | WEIGHT: 198 LBS | BODY MASS INDEX: 28.35 KG/M2 | SYSTOLIC BLOOD PRESSURE: 124 MMHG | HEIGHT: 70 IN | TEMPERATURE: 97.5 F

## 2022-10-31 DIAGNOSIS — E55.9 VITAMIN D DEFICIENCY: ICD-10-CM

## 2022-10-31 DIAGNOSIS — R73.03 PREDIABETES: ICD-10-CM

## 2022-10-31 DIAGNOSIS — F33.9 DEPRESSION, RECURRENT (HCC): ICD-10-CM

## 2022-10-31 DIAGNOSIS — I69.30 SEQUELA OF LACUNAR INFARCTION: ICD-10-CM

## 2022-10-31 DIAGNOSIS — I10 PRIMARY HYPERTENSION: Primary | ICD-10-CM

## 2022-10-31 DIAGNOSIS — E78.2 MIXED HYPERLIPIDEMIA: ICD-10-CM

## 2022-10-31 DIAGNOSIS — Z23 ENCOUNTER FOR IMMUNIZATION: ICD-10-CM

## 2022-10-31 LAB
DME PARACHUTE DELIVERY DATE ACTUAL: NORMAL
DME PARACHUTE DELIVERY DATE EXPECTED: NORMAL
DME PARACHUTE DELIVERY DATE REQUESTED: NORMAL
DME PARACHUTE DELIVERY NOTE: NORMAL
DME PARACHUTE ITEM DESCRIPTION: NORMAL
DME PARACHUTE ORDER STATUS: NORMAL
DME PARACHUTE SUPPLIER NAME: NORMAL
DME PARACHUTE SUPPLIER PHONE: NORMAL

## 2022-10-31 NOTE — ASSESSMENT & PLAN NOTE
Well controlled with losartan 25 mg daily, amlodipine 10 mg daily  Continue same    Recheck in February

## 2022-10-31 NOTE — PROGRESS NOTES
Chief Complaint   Patient presents with   • Hyperlipidemia   • Hypertension     No refills needed      Name: Alda Bryan      : 1945      MRN: [id number]  Encounter Provider: Glenn Ibanez DO  Encounter Date: 10/31/2022   Encounter department: [facility] PRIMARY CARE    Assessment & Plan     1  Primary hypertension  Assessment & Plan:  Well controlled with losartan 25 mg daily, amlodipine 10 mg daily  Continue same  Recheck in February    Orders:  -     Comprehensive metabolic panel; Future; Expected date: 2023  -     CBC and differential; Future; Expected date: 2023  -     Lipid panel; Future; Expected date: 2023    2  Mixed hyperlipidemia  Assessment & Plan:  Continue atorvastatin 40 mg daily, recheck lab in January    Orders:  -     Comprehensive metabolic panel; Future; Expected date: 2023  -     Lipid panel; Future; Expected date: 2023  -     TSH, 3rd generation; Future; Expected date: 2023  -     T4; Future; Expected date: 2023    3  Prediabetes  Assessment & Plan:  Continue dietary discretion, recheck lab in January    Orders:  -     Comprehensive metabolic panel; Future; Expected date: 2023  -     Hemoglobin A1C; Future; Expected date: 2023    4  BMI 28 0-28 9,adult    5  Sequela of lacunar infarction  Assessment & Plan:  Continue clopidogrel 75 mg daily, atorvastatin 40 mg daily  Recheck in the office with 3 months    Orders:  -     CBC and differential; Future; Expected date: 2023  -     Lipid panel; Future; Expected date: 2023    6  Encounter for immunization  -     influenza vaccine, high-dose, PF 0 7 mL (FLUZONE HIGH-DOSE)    7  Vitamin D deficiency  -     Vitamin D 25 hydroxy; Future; Expected date: 2023    8  Depression, recurrent ([facility] [address] )  Assessment & Plan:  Did not tolerate escitalopram, made him too sleepy    Discussed with patient at length today, highly recommend he be more physically active he will take this under advisement  Subjective      He is here for follow-up on his hypertension, hyperlipidemia, history of stroke, weight, and moods  He discussed depression with his neurologist was put on escitalopram   Patient did not tolerate it, made him sleep for the next 3 days  Discussed this at length with the patient  He certainly has some mild depression but he would like to try to control it through physical activity and being more involved  Review of Systems   Constitutional: Negative for appetite change, chills, diaphoresis, fatigue, fever and unexpected weight change  HENT: Negative for congestion, ear pain, hearing loss, nosebleeds, postnasal drip, rhinorrhea, sinus pressure, sore throat, tinnitus and trouble swallowing  Eyes: Negative for photophobia, pain, discharge, redness, itching and visual disturbance  Respiratory: Negative for cough, chest tightness, shortness of breath and wheezing  Cardiovascular: Negative for chest pain, palpitations and leg swelling  Denies orthopnea , dyspnea on exertion   Gastrointestinal: Negative for abdominal distention, abdominal pain, blood in stool, constipation, diarrhea, nausea and vomiting  Endocrine: Negative  Genitourinary: Negative for difficulty urinating, dysuria, flank pain, frequency, hematuria and urgency  Denies nocturia , erectile dysfunction   Musculoskeletal: Negative for arthralgias, back pain, gait problem, joint swelling and myalgias  Skin: Negative for pallor, rash and wound  Denies skin lesions   Allergic/Immunologic: Negative for environmental allergies, food allergies and immunocompromised state  Neurological: Negative for dizziness, tremors, seizures, syncope, speech difficulty, weakness, numbness and headaches  Hematological: Negative for adenopathy  Does not bruise/bleed easily     Psychiatric/Behavioral: Negative for behavioral problems, confusion, sleep disturbance and suicidal ideas  The patient is not nervous/anxious  Current Outpatient Medications on File Prior to Visit   Medication Sig   • acetaminophen (TYLENOL) 325 mg tablet Take 650 mg by mouth every 6 (six) hours as needed for mild pain As needed   • amLODIPine (NORVASC) 10 mg tablet TAKE 1 TABLET BY MOUTH EVERY DAY   • atorvastatin (LIPITOR) 40 mg tablet TAKE 1 TABLET BY MOUTH DAILY WITH DINNER   • clopidogrel (PLAVIX) 75 mg tablet TAKE 1 TABLET BY MOUTH EVERY DAY   • escitalopram (Lexapro) 10 mg tablet 1/2 tab daily for 1 week, if no side effects increase to 1 tab daily   • fluticasone (FLONASE) 50 mcg/act nasal spray 1 spray into each nostril daily   • losartan (COZAAR) 25 mg tablet TAKE 1 TABLET (25 MG TOTAL) BY MOUTH DAILY  Objective     /72   Temp 97 5 °F (36 4 °C)   Ht 5' 10" (1 778 m)   Wt 89 8 kg (198 lb)   BMI 28 41 kg/m²     Physical Exam  Constitutional:       Appearance: He is well-developed  HENT:      Head: Normocephalic and atraumatic  Right Ear: Tympanic membrane and ear canal normal       Left Ear: Tympanic membrane and ear canal normal       Nose: Nose normal    Eyes:      Conjunctiva/sclera: Conjunctivae normal       Pupils: Pupils are equal, round, and reactive to light  Neck:      Thyroid: No thyromegaly  Vascular: No JVD  Trachea: No tracheal deviation  Cardiovascular:      Rate and Rhythm: Normal rate and regular rhythm  Heart sounds: No murmur heard  Pulmonary:      Effort: Pulmonary effort is normal       Breath sounds: Normal breath sounds  No wheezing or rales  Abdominal:      General: Bowel sounds are normal       Palpations: Abdomen is soft  There is no mass  Tenderness: There is no abdominal tenderness  There is no guarding or rebound  Musculoskeletal:         General: No tenderness or deformity  Cervical back: Normal range of motion and neck supple  Lymphadenopathy:      Cervical: No cervical adenopathy     Skin:     General: Skin is warm and dry  Findings: No lesion or rash  Nails: There is no clubbing  Neurological:      Mental Status: He is alert and oriented to person, place, and time  Cranial Nerves: No cranial nerve deficit  Motor: No abnormal muscle tone  Coordination: Coordination normal       Deep Tendon Reflexes: Reflexes are normal and symmetric  Reflexes normal    Psychiatric:         Attention and Perception: Attention normal          Mood and Affect: Mood and affect normal          Speech: Speech normal          Behavior: Behavior normal  Behavior is cooperative  Thought Content: Thought content normal          Cognition and Memory: Memory is impaired           Judgment: Judgment normal        Angela Show, DO

## 2022-10-31 NOTE — ASSESSMENT & PLAN NOTE
Did not tolerate escitalopram, made him too sleepy  Discussed with patient at length today, highly recommend he be more physically active he will take this under advisement

## 2022-11-09 ENCOUNTER — CONSULT (OUTPATIENT)
Dept: CARDIOLOGY CLINIC | Facility: CLINIC | Age: 77
End: 2022-11-09

## 2022-11-09 VITALS
WEIGHT: 200 LBS | DIASTOLIC BLOOD PRESSURE: 64 MMHG | BODY MASS INDEX: 28.63 KG/M2 | HEART RATE: 56 BPM | HEIGHT: 70 IN | SYSTOLIC BLOOD PRESSURE: 136 MMHG

## 2022-11-09 DIAGNOSIS — I65.21 RIGHT INTERNAL CAROTID OCCLUSION: ICD-10-CM

## 2022-11-09 DIAGNOSIS — R94.31 EKG, ABNORMAL: ICD-10-CM

## 2022-11-09 DIAGNOSIS — R00.1 BRADYCARDIA: ICD-10-CM

## 2022-11-09 DIAGNOSIS — I49.1 PREMATURE ATRIAL CONTRACTION: Primary | ICD-10-CM

## 2022-11-09 DIAGNOSIS — I10 PRIMARY HYPERTENSION: ICD-10-CM

## 2022-11-09 DIAGNOSIS — E78.2 MIXED HYPERLIPIDEMIA: ICD-10-CM

## 2022-11-09 NOTE — ASSESSMENT & PLAN NOTE
Mr Krystle Ballard is a pleasant 68-year-old gentleman with known history of hemorrhagic CVA, hypertension, carotid artery disease and recently diagnosed obstructive sleep apnea  He was noted to have transient bradycardia and junctional rhythm during his sleep study  He gives no symptoms that suggest daytime dysrhythmia with symptoms or atrial fibrillation  His blood pressure is currently well controlled  His ECG shows sinus arrhythmia and premature atrial contractions  His left ventricular function is normal   His last echocardiogram in October 2021 showed that his pulmonary artery pressure was close to upper limit of normal     I suspect junctional bradycardia and his sleep is related to his under corrected sleep apnea  Treatment for this would be to treat her sleep apnea  -- I am requesting a 48 hour Holter monitor to assess his cardiac rhythm  -- I am advising him to continue his current medications  -- as his cardiac physical examination is benign I am withholding from repeating an echocardiogram   -- I am advising him to reach out to his sleep medicine specialist is and CPAP suppliers to discuss possibility of changing the mask RV adjusting the mask for fitness  I emphasized with him the importance of using the CPAP consistently as it is going to prevent him from developing arrhythmias in the future as well as developing pulmonary hypertension  -- he has continued advised to continue normal activities and regular exercise  -- Dietary and medical compliance are reinforced  -- He is advised  to report any concerning symptoms such as chest pain, shortness of breath, decline in exercise tolerance or presyncope/syncope

## 2022-11-09 NOTE — PROGRESS NOTES
[facility]  [address], [place] [address]  Phone#  [phone]  Fax#  [phone]  *-*-*-*-*-*-*-*-*-*-*-*-*-*-*-*-*-*-*-*-*-*-*-*-*-*-*-*-*-*-*-*-*-*-*-*-*-*-*-*-*-*-*-*-*-*-*-*-*-*-*-*-*-*  [facility] [place] DATE: 11/09/22 10:07 AM  PATIENT NAME: Mercedes Ramesh   1945    [id number]  Age: 76 y o  Sex: male  AUTHOR: Janet Lara  PRIMARYCARE PHYSICIAN: Gracie Jorgensen DO  REFERRING PHYSICIAN: Gracie Jorgensen DO  [address]  [address],  [address], *   *-*-*-*-*-*-*-*-*-*-*-*-*-*-*-*-*-*-*-*-*-*-*-*-*-*-*-*-*-*-*-*-*-*-*-*-*-*-*-*-*-*-*-*-*-*-*-*-*-*-*-*-*-*-  REASON FOR REFERRAL:  Cardiac rhythm abnormalities noted during sleep study  *-*-*-*-*-*-*-*-*-*-*-*-*-*-*-*-*-*-*-*-*-*-*-*-*-*-*-*-*-*-*-*-*-*-*-*-*-*-*-*-*-*-*-*-*-*-*-*-*-*-*-*-*-*-  CARDIOLOGY ASSESSMENT & PLAN:   Diagnosis ICD-10-CM Associated Orders   1  Premature atrial contraction  I49 1    2  EKG, abnormal  R94 31 Ambulatory Referral to Cardiology     POCT ECG   3  Primary hypertension  I10 Ambulatory Referral to Cardiology     POCT ECG     Holter monitor   4  Mixed hyperlipidemia  E78 2 Ambulatory Referral to Cardiology     POCT ECG   5  Right internal carotid occlusion  I65 21    6  Bradycardia  R00 1 Holter monitor     EKG, abnormal  Mr Mercedes Ramesh is a pleasant 27-year-old gentleman with known history of hemorrhagic CVA, hypertension, carotid artery disease and recently diagnosed obstructive sleep apnea  He was noted to have transient bradycardia and junctional rhythm during his sleep study  He gives no symptoms that suggest daytime dysrhythmia with symptoms or atrial fibrillation  His blood pressure is currently well controlled  His ECG shows sinus arrhythmia and premature atrial contractions    His left ventricular function is normal   His last echocardiogram in October 2021 showed that his pulmonary artery pressure was close to upper limit of normal     I suspect junctional bradycardia and his sleep is related to his under corrected sleep apnea  Treatment for this would be to treat her sleep apnea  -- I am requesting a 48 hour Holter monitor to assess his cardiac rhythm  -- I am advising him to continue his current medications  -- as his cardiac physical examination is benign I am withholding from repeating an echocardiogram   -- I am advising him to reach out to his sleep medicine specialist is and CPAP suppliers to discuss possibility of changing the mask RV adjusting the mask for fitness  I emphasized with him the importance of using the CPAP consistently as it is going to prevent him from developing arrhythmias in the future as well as developing pulmonary hypertension  -- he has continued advised to continue normal activities and regular exercise  -- Dietary and medical compliance are reinforced  -- He is advised  to report any concerning symptoms such as chest pain, shortness of breath, decline in exercise tolerance or presyncope/syncope  *-*-*-*-*-*-*-*-*-*-*-*-*-*-*-*-*-*-*-*-*-*-*-*-*-*-*-*-*-*-*-*-*-*-*-*-*-*-*-*-*-*-*-*-*-*-*-*-*-*-*-*-*-*-  CURRENT ECG:  Results for orders placed or performed in visit on 11/09/22   POCT ECG    Narrative    Sinus bradycardia rhythm with sinus arrhythmia and occasional premature atrial complexes, leftward axis, normal intervals, no significant chamber hypertrophy enlargement  No evidence of prior infarction or ongoing ischemia  HR 56 beats per minute  *-*-*-*-*-*-*-*-*-*-*-*-*-*-*-*-*-*-*-*-*-*-*-*-*-*-*-*-*-*-*-*-*-*-*-*-*-*-*-*-*-*-*-*-*-*-*-*-*-*-*-*-*-*-  HISTORY OF PRESENT ILLNESS:  Patient is a pleasant 75-year-old gentleman with medical history significant for:  1  Primary hypertension  2  Dyslipidemia  3  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, October 2021  4  Bilateral carotid artery disease  5   History of prostate CA, status post radical prostatectomy about 23 years back  6  Sensorineural hearing loss bilaterally  7  Chronic rhinitis  8  Recently diagnosed obstructive sleep apnea  9  Actinic keratosis  10  Diastolic dysfunction without heart failure  11  History of recurrent depression  12  Right rotator cuff injury following fall from bed    He recently was diagnosed with obstructive sleep apnea and in September underwent CPAP machine titration study  During this he was noted to have junctional bradycardia at times during the study  He was advised follow-up ECG and cardiac evaluation  He has no prior history of coronary artery disease or congestive heart failure or atrial fibrillation or arrhythmias  He was hospitalized at [facility] in October 2021 after presenting with expressive aphasia  His CT head showed acute parenchymal hemorrhage within the left thalamus likely due to hypertensive hemorrhage  During the stay his workup showed right internal carotid occlusion and 50-69 left internal carotid artery stenosis  He was subsequently seen by vascular surgery and medical management was advised for left carotid stenosis  He is currently on mono antiplatelet therapy with clopidogrel 75 mg daily  He has had no recurrence of stroke since October 2021  From a symptom perspective he reports that a couple of months back he fell down from his bed and injured his right shoulder  He has been diagnosed with right rotator cuff injury  He is being conservatively managed  He has difficulty with extension of the right arm and some weakness  Few months back he was sent to have a sleep study because his wife noticed him to have excessive snoring and apneic spells during sleep and he had daytime fatigue and headache  He was confirmed to have obstructive sleep apnea    Most recently he has been started on CPAP therapy however he is finding it difficult to keep the mask covering his mouth and nose and it is often coming of disturbing his sleep and his not being able to be compliant with its use  From a symptom perspective he denies any recent unusual chest pain or palpitation or dizziness or lightheadedness or passing out or near passing out experience  He does report frequent numbness of his hands especially when he is sleeping on the left side his hand very easily becomes numb  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 and weakness in right lower extremity  He has had no passing out or near passing out episodes  He denies any pedal edema  He has no prior history of coronary artery disease or congestive heart failure or atrial fibrillation or arrhythmias  He has a remote history of prostate cancer for which she underwent radical prostatectomy about 23 years back and he has been in remission  He is physically very active considering his comorbidities and age  He goes to gym 3 times a week and 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  He denies family history of premature CAD or sudden cardiac death  He quit smoking when he was young  He used to drink intermittently but now he quit after his stroke last year  He drinks very rarely a beer  His current cardiac medications include atorvastatin 40 mg daily, amlodipine 10 mg daily, losartan 25 mg daily, clopidogrel 75 mg daily  Blood work in July 2022 was significant for normal renal function and electrolytes and liver function tests, his total cholesterol was 134, triglyceride 65 HDL 50, calculated LDL was 71, hemoglobin and hematocrit were normal   His hemoglobin A1c was 5 8  His TSH was 1 55  His last cardiac evaluation was following his stroke in October 2021  His left ventricular function was normal with EF of 75%    Grade 2 diastolic dysfunction, normal right ventricular size and function, moderate left atrial and mild right atrial enlargement, aortic valve sclerosis, trace aortic valve regurgitation, trace mitral valve regurgitation, trace tricuspid valve regurgitation, no pulmonic valve regurgitation, estimated RVSP/PASP was close to upper limit of normal at 35 mmHg, there was no pericardial effusion      *-*-*-*-*-*-*-*-*-*-*-*-*-*-*-*-*-*-*-*-*-*-*-*-*-*-*-*-*-*-*-*-*-*-*-*-*-*-*-*-*-*-*-*-*-*-*-*-*-*-*-*-*-*  PAST MEDICAL HISTORY:  Past Medical History:   Diagnosis Date   • Cancer [facility])     prostate   • Carotid stenosis    • Hypertension    • Left sided [facility]ar [facility] ([facility] ) 12/30/2020    PAST SURGICAL HISTORY:   Past Surgical History:   Procedure Laterality Date   • TONSILLECTOMY     • TRANSURETHRAL RESECTION OF PROSTATE           FAMILY HISTORY:  Family History   Problem Relation Age of Onset   • Colon cancer Mother    • No Known Problems Father    • Leukemia Maternal Grandmother    • Diabetes Maternal Grandmother    • Alcohol abuse Maternal Grandfather    • No Known Problems Paternal Grandmother    • No Known Problems Paternal Grandfather     SOCIAL HISTORY:  Social History     Tobacco Use   Smoking Status Former Smoker   Smokeless Tobacco Never Used      Social History     Substance and Sexual Activity   Alcohol Use Yes   • Alcohol/week: 4 0 standard drinks   • Types: 4 Cans of beer per week    Comment: several days a week     Social History     Substance and Sexual Activity   Drug Use Never    [unfilled]     *-*-*-*-*-*-*-*-*-*-*-*-*-*-*-*-*-*-*-*-*-*-*-*-*-*-*-*-*-*-*-*-*-*-*-*-*-*-*-*-*-*-*-*-*-*-*-*-*-*-*-*-*-*  ALLERGIES:  Allergies   Allergen Reactions   • Ace Inhibitors Cough    CURRENT SCHEDULED MEDICATIONS:    Current Outpatient Medications:   •  acetaminophen (TYLENOL) 325 mg tablet, Take 650 mg by mouth every 6 (six) hours as needed for mild pain As needed, Disp: , Rfl:   •  amLODIPine (NORVASC) 10 mg tablet, TAKE 1 TABLET BY MOUTH EVERY DAY, Disp: 90 tablet, Rfl: 1  •  atorvastatin (LIPITOR) 40 mg tablet, TAKE 1 TABLET BY MOUTH DAILY WITH DINNER, Disp: 90 tablet, Rfl: 1  •  clopidogrel (PLAVIX) 75 mg tablet, TAKE 1 TABLET BY MOUTH EVERY DAY, Disp: 90 tablet, Rfl: 1  •  escitalopram (Lexapro) 10 mg tablet, 1/2 tab daily for 1 week, if no side effects increase to 1 tab daily, Disp: 30 tablet, Rfl: 3  •  fluticasone (FLONASE) 50 mcg/act nasal spray, 1 spray into each nostril daily, Disp: 16 g, Rfl: 5  •  losartan (COZAAR) 25 mg tablet, TAKE 1 TABLET (25 MG TOTAL) BY MOUTH DAILY  , Disp: 90 tablet, Rfl: 1     *-*-*-*-*-*-*-*-*-*-*-*-*-*-*-*-*-*-*-*-*-*-*-*-*-*-*-*-*-*-*-*-*-*-*-*-*-*-*-*-*-*-*-*-*-*-*-*-*-*-*-*-*-*  REVIEW OF SYMPTOMS:    Positive for:  As noted above in HPI  Negative for: All remaining as reviewed below and in HPI   SYSTEM SYMPTOMS REVIEWED:  General--weight change, fever, night sweats  Respiratoryl-- Wheezing, shortness of breath, cough, URI symptoms, sputum, blood  Cardiovascular--chest pain, syncope, dyspnea on exertion, edema, decline in exercise tolerance, claudication   Gastrointestinal--persistent vomiting, diarrhea, abdominal distention, blood in stool   Muscular or skeletal--joint pain or swelling   Neurologic--headaches, syncope, abnormal movement  Hematologic--history of easy bruising and bleeding   Endocrine--thyroid enlargement, heat or cold intolerance, polyuria   Psychiatric--anxiety, depression      *-*-*-*-*-*-*-*-*-*-*-*-*-*-*-*-*-*-*-*-*-*-*-*-*-*-*-*-*-*-*-*-*-*-*-*-*-*-*-*-*-*-*-*-*-*-*-*-*-*-*-*-*-*  CURRENT OUTPATIENT MEDICATIONS:     Current Outpatient Medications:   •  acetaminophen (TYLENOL) 325 mg tablet, Take 650 mg by mouth every 6 (six) hours as needed for mild pain As needed, Disp: , Rfl:   •  amLODIPine (NORVASC) 10 mg tablet, TAKE 1 TABLET BY MOUTH EVERY DAY, Disp: 90 tablet, Rfl: 1  •  atorvastatin (LIPITOR) 40 mg tablet, TAKE 1 TABLET BY MOUTH DAILY WITH DINNER, Disp: 90 tablet, Rfl: 1  •  clopidogrel (PLAVIX) 75 mg tablet, TAKE 1 TABLET BY MOUTH EVERY DAY, Disp: 90 tablet, Rfl: 1  •  escitalopram (Lexapro) 10 mg tablet, 1/2 tab daily for 1 week, if no side effects increase to 1 tab daily, Disp: 30 tablet, Rfl: 3  •  fluticasone (FLONASE) 50 mcg/act nasal spray, 1 spray into each nostril daily, Disp: 16 g, Rfl: 5  •  losartan (COZAAR) 25 mg tablet, TAKE 1 TABLET (25 MG TOTAL) BY MOUTH DAILY  , Disp: 90 tablet, Rfl: 1    *-*-*-*-*-*-*-*-*-*-*-*-*-*-*-*-*-*-*-*-*-*-*-*-*-*-*-*-*-*-*-*-*-*-*-*-*-*-*-*-*-*-*-*-*-*-*-*-*-*-*-*-*-*  VITAL SIGNS:  Vitals:    11/09/22 0911   BP: 136/64   BP Location: Right arm   Patient Position: Sitting   Cuff Size: Large   Pulse: 56   Weight: 90 7 kg (200 lb)   Height: 5' 10" (1 778 m)       BMI: Body mass index is 28 7 kg/m²  WEIGHTS:   Wt Readings from Last 25 Encounters:   11/09/22 90 7 kg (200 lb)   10/31/22 89 8 kg (198 lb)   10/11/22 90 6 kg (199 lb 11 2 oz)   09/14/22 92 1 kg (203 lb)   07/29/22 92 5 kg (204 lb)   06/02/22 92 7 kg (204 lb 6 4 oz)   04/29/22 90 3 kg (199 lb)   04/08/22 90 3 kg (199 lb)   03/23/22 88 kg (194 lb)   01/27/22 86 2 kg (190 lb)   12/16/21 84 4 kg (186 lb)   11/29/21 84 4 kg (186 lb)   11/04/21 84 4 kg (186 lb)   11/02/21 85 3 kg (188 lb)   10/25/21 85 kg (187 lb 8 oz)   10/08/21 91 4 kg (201 lb 8 oz)   10/02/21 94 kg (207 lb 3 7 oz)   07/06/21 93 2 kg (205 lb 6 4 oz)   01/27/21 91 7 kg (202 lb 3 2 oz)   01/04/21 93 6 kg (206 lb 6 4 oz)   12/30/20 92 1 kg (203 lb)   12/30/20 92 kg (202 lb 12 8 oz)        *-*-*-*-*-*-*-*-*-*-*-*-*-*-*-*-*-*-*-*-*-*-*-*-*-*-*-*-*-*-*-*-*-*-*-*-*-*-*-*-*-*-*-*-*-*-*-*-*-*-*-*-*-*-  PHYSICAL EXAM:  General Appearance:    Alert, cooperative, no distress, appears stated age   Head, Eyes, ENT:    No obvious abnormality, moist mucous mebranes  Neck:   Supple, no carotid bruit or JVD   Back:     Symmetric, no curvature  Lungs:     Respirations unlabored  Clear to auscultation bilaterally,    Chest wall:    No tenderness or deformity   Heart:    Regular rate and rhythm interrupted with premature beats, S1 and S2 normal, no murmur, rub  or gallop     Abdomen:     Soft, non-tender, no abdominal bruit   Extremities:   Extremities warm, no cyanosis or edema    Skin:   no venostatic changes in lower extremities  Normal skin color, texture, and turgor  No rashes or lesions     *-*-*-*-*-*-*-*-*-*-*-*-*-*-*-*-*-*-*-*-*-*-*-*-*-*-*-*-*-*-*-*-*-*-*-*-*-*-*-*-*-*-*-*-*-*-*-*-*-*-*-*-*-*-  LABORATORY DATA: I have personally reviewed the available laboratory data  Potassium   Date Value Ref Range Status   07/28/2022 4 5 3 5 - 5 3 mmol/L Final   04/21/2022 4 2 3 5 - 5 3 mmol/L Final   10/06/2021 4 9 3 5 - 5 3 mmol/L Final     Comment:     Slightly Hemolyzed; Results May be Affected&XA&Slightly Hemolyzed; Results May be Affected&XA&Slightly Hemolyzed; Results May be Affected&XA&Slightly Hemolyzed; Results May be Affected&XA&Slightly Hemolyzed;  Results May be Affected   07/06/2021 4 6 3 5 - 5 3 mmol/L Final     Chloride   Date Value Ref Range Status   07/28/2022 109 (H) 96 - 108 mmol/L Final   04/21/2022 108 100 - 108 mmol/L Final   10/06/2021 107 100 - 108 mmol/L Final   07/06/2021 104 98 - 110 mmol/L Final     CO2   Date Value Ref Range Status   07/28/2022 27 21 - 32 mmol/L Final   04/21/2022 29 21 - 32 mmol/L Final   10/06/2021 24 21 - 32 mmol/L Final   07/06/2021 26 20 - 32 mmol/L Final     BUN   Date Value Ref Range Status   07/28/2022 15 5 - 25 mg/dL Final   04/21/2022 17 5 - 25 mg/dL Final   10/06/2021 16 5 - 25 mg/dL Final   07/06/2021 13 7 - 25 mg/dL Final     Creatinine   Date Value Ref Range Status   07/28/2022 0 96 0 60 - 1 30 mg/dL Final     Comment:     Standardized to IDMS reference method   04/21/2022 1 01 0 60 - 1 30 mg/dL Final     Comment:     Standardized to IDMS reference method   10/06/2021 0 80 0 60 - 1 30 mg/dL Final     Comment:     Standardized to IDMS reference method     eGFR   Date Value Ref Range Status   07/28/2022 75 ml/min/1 73sq m Final   04/21/2022 70 ml/min/1 73sq m Final   10/06/2021 86 ml/min/1 73sq m Final     Calcium   Date Value Ref Range Status   07/28/2022 9 6 8 3 - 10 1 mg/dL Final   04/21/2022 9 4 8 3 - 10 1 mg/dL Final   10/06/2021 9 6 8 3 - 10 1 mg/dL Final   07/06/2021 9 4 8 6 - 10 3 mg/dL Final     AST   Date Value Ref Range Status   07/28/2022 19 5 - 45 U/L Final     Comment:     Specimen collection should occur prior to Sulfasalazine administration due to the potential for falsely depressed results  04/21/2022 19 5 - 45 U/L Final     Comment:     Specimen collection should occur prior to Sulfasalazine administration due to the potential for falsely depressed results  10/03/2021 20 5 - 45 U/L Final     Comment:     Specimen collection should occur prior to Sulfasalazine administration due to the potential for falsely depressed results  07/06/2021 22 10 - 35 U/L Final     ALT   Date Value Ref Range Status   07/28/2022 45 12 - 78 U/L Final     Comment:     Specimen collection should occur prior to Sulfasalazine and/or Sulfapyridine administration due to the potential for falsely depressed results  04/21/2022 38 12 - 78 U/L Final     Comment:     Specimen collection should occur prior to Sulfasalazine and/or Sulfapyridine administration due to the potential for falsely depressed results  10/03/2021 30 12 - 78 U/L Final     Comment:     Specimen collection should occur prior to Sulfasalazine and/or Sulfapyridine administration due to the potential for falsely depressed results  07/06/2021 35 9 - 46 U/L Final     Alkaline Phosphatase   Date Value Ref Range Status   07/28/2022 93 46 - 116 U/L Final   04/21/2022 99 46 - 116 U/L Final   10/03/2021 69 46 - 116 U/L Final   07/06/2021 82 35 - 144 U/L Final     Magnesium   Date Value Ref Range Status   10/06/2021 2 5 1 6 - 2 6 mg/dL Final     Comment:     Slightly Hemolyzed; Results May be Affected&XA&Slightly Hemolyzed; Results May be Affected&XA&Slightly Hemolyzed; Results May be Affected&XA&Slightly Hemolyzed; Results May be Affected&XA&Slightly Hemolyzed;  Results May be Affected   10/05/2021 2 5 1 6 - 2 6 mg/dL Final     Comment:     Slightly Hemolyzed; Results May be Affected   10/04/2021 2 3 1 6 - 2 6 mg/dL Final     WBC   Date Value Ref Range Status   04/21/2022 6 16 4 31 - 10 16 Thousand/uL Final   10/05/2021 6 92 4 31 - 10 16 Thousand/uL Final   10/04/2021 8 70 4 31 - 10 16 Thousand/uL Final     Hemoglobin   Date Value Ref Range Status   04/21/2022 15 1 12 0 - 17 0 g/dL Final   10/05/2021 15 6 12 0 - 17 0 g/dL Final   10/04/2021 14 5 12 0 - 17 0 g/dL Final     Platelets   Date Value Ref Range Status   04/21/2022 168 149 - 390 Thousands/uL Final   10/05/2021 159 149 - 390 Thousands/uL Final   10/04/2021 164 149 - 390 Thousands/uL Final     PTT   Date Value Ref Range Status   10/02/2021 34 23 - 37 seconds Final     Comment:     Therapeutic Heparin Range =  60-90 seconds   12/30/2020 35 23 - 37 seconds Final     Comment:     Therapeutic Heparin Range =  60-90 seconds     INR   Date Value Ref Range Status   10/02/2021 1 03 0 84 - 1 19 Final   12/30/2020 1 07 0 84 - 1 19 Final     No results found for: CKMB, DIGOXIN  TSH   Date Value Ref Range Status   07/06/2021 1 61 0 40 - 4 50 mIU/L Final     HDL   Date Value Ref Range Status   07/06/2021 55 > OR = 40 mg/dL Final     HDL, Direct   Date Value Ref Range Status   07/28/2022 50 >=40 mg/dL Final     Comment:     Specimen collection should occur prior to Metamizole administration due to the potential for falsley depressed results  Triglycerides   Date Value Ref Range Status   07/28/2022 65 See Comment mg/dL Final     Comment:     Triglyceride:     0-9Y            <75mg/dL     10Y-17Y         <90 mg/dL       >=18Y     Normal          <150 mg/dL     Borderline High 150-199 mg/dL     High            200-499 mg/dL        Very High       >499 mg/dL    Specimen collection should occur prior to N-Acetylcysteine or Metamizole administration due to the potential for falsely depressed results     07/06/2021 118 <150 mg/dL Final      Hemoglobin A1C   Date Value Ref Range Status   07/28/2022 5 8 (H) Normal 3 8-5 6%; PreDiabetic 5 7-6 4%; Diabetic >=6 5%; Glycemic control for adults with diabetes <7 0% % Final     No results found for: Fady Hilts, GRAMSTAIN, URINECX, WOUNDCULT, BODYFLUIDCUL, MRSACULTURE, INFLUAPCR, INFLUBPCR, RSVPCR, LEGIONELLAUR, CDIFFTOXINB    *-*-*-*-*-*-*-*-*-*-*-*-*-*-*-*-*-*-*-*-*-*-*-*-*-*-*-*-*-*-*-*-*-*-*-*-*-*-*-*-*-*-*-*-*-*-*-*-*-*-*-*-*-*-  RADIOLOGY RESULTS:  RBD CPAP Study    Result Date: 10/3/2022  Impression:    Patient slept 207 minutes out of 371 minutes representing poor sleep efficiency of 56%  Sleep architecture demonstrated a normal sleep latency and reduced REM latency  It also demonstrated increased stage N1 sleep, reduced stage N2 end stage N3 sleep, and normal REM sleep  EKG demonstrated irregular rhythm with occasionally absent P-waves, a 12 lead EKG is recommended to further evaluate  Oxygenation was adequate through the night with 3 minutes below 90% saturation and lowest SpO2 of 81%  Patient did not demonstrate any abnormal behavior during REM sleep although if clinicall suspicion for REM behavior disorder persists, a repeat RBD CPAP study can be considered future  CPAP titration was initiated at 5 cm H2O was increased up to 13 cm H2O  He did well at 13 cm H2O which was tested with adequate sleep time, REM sleep, and supine position would AHI of 1 event per hour at 13 cm H2O  Therefore I will recommend auto CPAP 12-20 cm H2O with heated humidification using the medium ResMed Med AirFit F20 full face mask  MD Sherrill Page [facility] Sleep Medicine Fellow Attestation I have reviewed the patient history, study data and discussed the interpretation with the fellow  I agree with documentation as above             Board Certified Sleep Physician      *-*-*-*-*-*-*-*-*-*-*-*-*-*-*-*-*-*-*-*-*-*-*-*-*-*-*-*-*-*-*-*-*-*-*-*-*-*-*-*-*-*-*-*-*-*-*-*-*-*-*-*-*-*-  LAST ECHOCARDIOGRAM AND OTHER CARDIOLOGY RESULTS:  Results for orders placed during the hospital encounter of 10/02/21    Echo complete with contrast if indicated    Narrative  [address]  [facility], [address] [facility]  [phone]    Transthoracic Echocardiogram  2D, M-mode, Doppler, and Color Doppler    Study date:  03-Oct-2021    Patient: Ivan López  MR number: [id number]  Account number: [de-identified]  : 1945  Age: 64 years  Gender: Male  Status: Inpatient  Location: Bedside  Height: 70 in  Weight: 205 9 lb  BP: 105/ 59 mmHg    Indications: Cerebral Infarction    Diagnoses: I63 9 - Cerebral infarction, unspecified    Sonographer:  Max Sabillon RDCS  Primary Physician:  Jaydon Arredondo DO  Referring Physician:  Yassine Krause MD  Group:  [facility]  Interpreting Physician:  María Solares MD    SUMMARY    LEFT VENTRICLE:  Systolic function was hyperdynamic by visual assessment  Ejection fraction was estimated to be 75 %  There were no regional wall motion abnormalities  Features were consistent with a pseudonormal left ventricular filling pattern, with concomitant abnormal relaxation and increased filling pressure (grade 2 diastolic dysfunction)  LEFT ATRIUM:  The atrium was moderately dilated  RIGHT ATRIUM:  The atrium was mildly dilated  MITRAL VALVE:  There was trace regurgitation  AORTIC VALVE:  There was trace regurgitation  TRICUSPID VALVE:  There was trace regurgitation  IVC, HEPATIC VEINS:  The inferior vena cava was dilated  HISTORY: PRIOR HISTORY: Hypertension, Prostate Cancer, PVC's    PROCEDURE: The procedure was performed at the bedside  This was a routine study  The transthoracic approach was used  The study included complete 2D imaging, M-mode, complete spectral Doppler, and color Doppler  The heart rate was 69 bpm,  at the start of the study  Images were obtained from the parasternal, apical, subcostal, and suprasternal notch acoustic windows  Image quality was adequate      LEFT VENTRICLE: Size was normal  Systolic function was hyperdynamic by visual assessment  Ejection fraction was estimated to be 75 %  There were no regional wall motion abnormalities  Wall thickness was normal  DOPPLER: Features were  consistent with a pseudonormal left ventricular filling pattern, with concomitant abnormal relaxation and increased filling pressure (grade 2 diastolic dysfunction)  RIGHT VENTRICLE: The size was normal  Systolic function was normal  Wall thickness was normal     LEFT ATRIUM: The atrium was moderately dilated  RIGHT ATRIUM: The atrium was mildly dilated  MITRAL VALVE: Valve structure was normal  There was normal leaflet separation  DOPPLER: The transmitral velocity was within the normal range  There was no evidence for stenosis  There was trace regurgitation  AORTIC VALVE: The valve was trileaflet  Leaflets exhibited normal thickness and normal cuspal separation  DOPPLER: Transaortic velocity was within the normal range  There was no evidence for stenosis  There was trace regurgitation  TRICUSPID VALVE: The valve structure was normal  There was normal leaflet separation  DOPPLER: The transtricuspid velocity was within the normal range  There was no evidence for stenosis  There was trace regurgitation  Pulmonary artery  systolic pressure was at the upper limits of normal  Estimated peak PA pressure was 35 mmHg  PULMONIC VALVE: Leaflets exhibited normal thickness, no calcification, and normal cuspal separation  DOPPLER: The transpulmonic velocity was within the normal range  There was no significant regurgitation  PERICARDIUM: There was no pericardial effusion  The pericardium was normal in appearance  AORTA: The root exhibited normal size  SYSTEMIC VEINS: IVC: The inferior vena cava was dilated   Respirophasic changes were normal     MEASUREMENT TABLES    OTHER ECHO MEASUREMENTS  (Reference normals)  Estimated CVP   10 mmHg   (--)    SYSTEM MEASUREMENT TABLES    2D  %FS: 24 66 %  Ao Diam: 3 71 cm  Ao asc: 3 43 cm  EDV(Teich): 60 63 ml  EF(Teich): 49 66 %  ESV(Teich): 30 52 ml  IVSd: 1 7 cm  LA Diam: 4 59 cm  LAAs A4C: 28 15 cm2  LAESV A-L A4C: 106 1 ml  LAESV MOD A4C: 102 28 ml  LALs A4C: 6 34 cm  LVEDV MOD A4C: 53 74 ml  LVEF MOD A4C: 57 21 %  LVESV MOD A4C: 22 99 ml  LVIDd: 3 77 cm  LVIDs: 2 84 cm  LVLd A4C: 7 98 cm  LVLs A4C: 6 98 cm  LVPWd: 1 31 cm  RAESV A-L: 57 71 ml  RAESV MOD: 53 66 ml  RALs: 6 51 cm  RVIDd: 2 87 cm  SV MOD A4C: 30 74 ml  SV(Teich): 30 11 ml    CW  AV Env  Ti: 312 08 ms  AV VTI: 25 01 cm  AV Vmax: 1 27 m/s  AV Vmean: 0 8 m/s  AV maxP 49 mmHg  AV meanPG: 3 19 mmHg  TR Vmax: 2 47 m/s  TR maxP 45 mmHg    MM  TAPSE: 2 62 cm    PW  E' Av 09 m/s  E' Lat: 0 11 m/s  E' Sept: 0 07 m/s  E/E' Avg: 10 3  E/E' Lat: 8 5  E/E' Sept: 13 08  LVOT Env  Ti: 274 02 ms  LVOT VTI: 20 88 cm  LVOT Vmax: 1 15 m/s  LVOT Vmean: 0 76 m/s  LVOT maxP 25 mmHg  LVOT meanP 75 mmHg  MV A Kyle: 0 45 m/s  MV Dec [place]: 4 43 m/s2  MV DecT: 208 05 ms  MV E Kyle: 0 92 m/s  MV E/A Ratio: 2 06  MV PHT: 60 33 ms  MVA By PHT: 3 65 cm2    [address] [facility]redited Echocardiography Laboratory    Prepared and electronically signed by    Karena Mendez MD  Signed 03-Oct-2021 14:07:17    No results found for this or any previous visit  No results found for this or any previous visit  No results found for this or any previous visit  *-*-*-*-*-*-*-*-*-*-*-*-*-*-*-*-*-*-*-*-*-*-*-*-*-*-*-*-*-*-*-*-*-*-*-*-*-*-*-*-*-*-*-*-*-*-*-*-*-*-*-*-*-*-  RADIOLOGY RESULTS:  RBD CPAP Study    Result Date: 10/3/2022  Impression:    Patient slept 207 minutes out of 371 minutes representing poor sleep efficiency of 56%  Sleep architecture demonstrated a normal sleep latency and reduced REM latency  It also demonstrated increased stage N1 sleep, reduced stage N2 end stage N3 sleep, and normal REM sleep    EKG demonstrated irregular rhythm with occasionally absent P-waves, a 12 lead EKG is recommended to further evaluate  Oxygenation was adequate through the night with 3 minutes below 90% saturation and lowest SpO2 of 81%  Patient did not demonstrate any abnormal behavior during REM sleep although if clinicall suspicion for REM behavior disorder persists, a repeat RBD CPAP study can be considered future  CPAP titration was initiated at 5 cm H2O was increased up to 13 cm H2O  He did well at 13 cm H2O which was tested with adequate sleep time, REM sleep, and supine position would AHI of 1 event per hour at 13 cm H2O  Therefore I will recommend auto CPAP 12-20 cm H2O with heated humidification using the medium ResMed Med AirFit F20 full face mask  MD Rashaad Angeles 71 Sleep Medicine Fellow Attestation I have reviewed the patient history, study data and discussed the interpretation with the fellow  I agree with documentation as above             Board Certified Sleep Physician      *-*-*-*-*-*-*-*-*-*-*-*-*-*-*-*-*-*-*-*-*-*-*-*-*-*-*-*-*-*-*-*-*-*-*-*-*-*-*-*-*-*-*-*-*-*-*-*-*-*-*-*-*-*-  ECHOCARDIOGRAM AND OTHER CARDIOLOGY RESULTS:  Results for orders placed during the hospital encounter of 10/02/21    Echo complete with contrast if indicated    Narrative  [facility] [address]  [facility], [address] [facility]  [phone]    Transthoracic Echocardiogram  2D, M-mode, Doppler, and Color Doppler    Study date:  03-Oct-2021    Patient: Viridiana Fry  MR number: [id number]  Account number: [de-identified]  : 1945  Age: 64 years  Gender: Male  Status: Inpatient  Location: Bedside  Height: 70 in  Weight: 205 9 lb  BP: 105/ 59 mmHg    Indications: Cerebral Infarction    Diagnoses: I63 9 - Cerebral infarction, unspecified    Sonographer:  Priscilla Hernandez RDCS  Primary Physician:  Jaylin Kessler DO  Referring Physician:  Alicia Leon MD  Group:  [facility]  Interpreting Physician:  Levi Mello MD    SUMMARY    LEFT VENTRICLE:  Systolic function was hyperdynamic by visual assessment  Ejection fraction was estimated to be 75 %  There were no regional wall motion abnormalities  Features were consistent with a pseudonormal left ventricular filling pattern, with concomitant abnormal relaxation and increased filling pressure (grade 2 diastolic dysfunction)  LEFT ATRIUM:  The atrium was moderately dilated  RIGHT ATRIUM:  The atrium was mildly dilated  MITRAL VALVE:  There was trace regurgitation  AORTIC VALVE:  There was trace regurgitation  TRICUSPID VALVE:  There was trace regurgitation  IVC, HEPATIC VEINS:  The inferior vena cava was dilated  HISTORY: PRIOR HISTORY: Hypertension, Prostate Cancer, PVC's    PROCEDURE: The procedure was performed at the bedside  This was a routine study  The transthoracic approach was used  The study included complete 2D imaging, M-mode, complete spectral Doppler, and color Doppler  The heart rate was 69 bpm,  at the start of the study  Images were obtained from the parasternal, apical, subcostal, and suprasternal notch acoustic windows  Image quality was adequate  LEFT VENTRICLE: Size was normal  Systolic function was hyperdynamic by visual assessment  Ejection fraction was estimated to be 75 %  There were no regional wall motion abnormalities  Wall thickness was normal  DOPPLER: Features were  consistent with a pseudonormal left ventricular filling pattern, with concomitant abnormal relaxation and increased filling pressure (grade 2 diastolic dysfunction)  RIGHT VENTRICLE: The size was normal  Systolic function was normal  Wall thickness was normal     LEFT ATRIUM: The atrium was moderately dilated  RIGHT ATRIUM: The atrium was mildly dilated  MITRAL VALVE: Valve structure was normal  There was normal leaflet separation  DOPPLER: The transmitral velocity was within the normal range  There was no evidence for stenosis  There was trace regurgitation      AORTIC VALVE: The valve was trileaflet  Leaflets exhibited normal thickness and normal cuspal separation  DOPPLER: Transaortic velocity was within the normal range  There was no evidence for stenosis  There was trace regurgitation  TRICUSPID VALVE: The valve structure was normal  There was normal leaflet separation  DOPPLER: The transtricuspid velocity was within the normal range  There was no evidence for stenosis  There was trace regurgitation  Pulmonary artery  systolic pressure was at the upper limits of normal  Estimated peak PA pressure was 35 mmHg  PULMONIC VALVE: Leaflets exhibited normal thickness, no calcification, and normal cuspal separation  DOPPLER: The transpulmonic velocity was within the normal range  There was no significant regurgitation  PERICARDIUM: There was no pericardial effusion  The pericardium was normal in appearance  AORTA: The root exhibited normal size  SYSTEMIC VEINS: IVC: The inferior vena cava was dilated  Respirophasic changes were normal     MEASUREMENT TABLES    OTHER ECHO MEASUREMENTS  (Reference normals)  Estimated CVP   10 mmHg   (--)    SYSTEM MEASUREMENT TABLES    2D  %FS: 24 66 %  Ao Diam: 3 71 cm  Ao asc: 3 43 cm  EDV(Teich): 60 63 ml  EF(Teich): 49 66 %  ESV(Teich): 30 52 ml  IVSd: 1 7 cm  LA Diam: 4 59 cm  LAAs A4C: 28 15 cm2  LAESV A-L A4C: 106 1 ml  LAESV MOD A4C: 102 28 ml  LALs A4C: 6 34 cm  LVEDV MOD A4C: 53 74 ml  LVEF MOD A4C: 57 21 %  LVESV MOD A4C: 22 99 ml  LVIDd: 3 77 cm  LVIDs: 2 84 cm  LVLd A4C: 7 98 cm  LVLs A4C: 6 98 cm  LVPWd: 1 31 cm  RAESV A-L: 57 71 ml  RAESV MOD: 53 66 ml  RALs: 6 51 cm  RVIDd: 2 87 cm  SV MOD A4C: 30 74 ml  SV(Teich): 30 11 ml    CW  AV Env  Ti: 312 08 ms  AV VTI: 25 01 cm  AV Vmax: 1 27 m/s  AV Vmean: 0 8 m/s  AV maxP 49 mmHg  AV meanPG: 3 19 mmHg  TR Vmax: 2 47 m/s  TR maxP 45 mmHg    MM  TAPSE: 2 62 cm    PW  E' Av 09 m/s  E' Lat: 0 11 m/s  E' Sept: 0 07 m/s  E/E' Avg: 10 3  E/E' Lat: 8 5  E/E' Sept: 13 08  LVOT Env  Ti: 274 02 ms  LVOT VTI: 20 88 cm  LVOT Vmax: 1 15 m/s  LVOT Vmean: 0 76 m/s  LVOT maxP 25 mmHg  LVOT meanP 75 mmHg  MV A Kyle: 0 45 m/s  MV Dec [place]: 4 43 m/s2  MV DecT: 208 05 ms  MV E Kyle: 0 92 m/s  MV E/A Ratio: 2 06  MV PHT: 60 33 ms  MVA By PHT: 3 65 cm2    [address] Accredited Echocardiography Laboratory    Prepared and electronically signed by    Heidi Bundy MD  Signed 03-Oct-2021 14:07:17    No results found for this or any previous visit  No results found for this or any previous visit  No results found for this or any previous visit         *-*-*-*-*-*-*-*-*-*-*-*-*-*-*-*-*-*-*-*-*-*-*-*-*-*-*-*-*-*-*-*-*-*-*-*-*-*-*-*-*-*-*-*-*-*-*-*-*-*-*-*-*-*-  SIGNATURES:   [unfilled]   [facility]     CC:   Melissa De Leon, DO Gomez Purcell, *

## 2022-11-09 NOTE — PATIENT INSTRUCTIONS
CARDIOLOGY ASSESSMENT & PLAN:   Diagnosis ICD-10-CM Associated Orders   1  Premature atrial contraction  I49 1    2  EKG, abnormal  R94 31 Ambulatory Referral to Cardiology     POCT ECG   3  Primary hypertension  I10 Ambulatory Referral to Cardiology     POCT ECG     Holter monitor   4  Mixed hyperlipidemia  E78 2 Ambulatory Referral to Cardiology     POCT ECG   5  Right internal carotid occlusion  I65 21    6  Bradycardia  R00 1 Holter monitor     EKG, abnormal  Mr Brandon Thibodeaux is a pleasant 68-year-old gentleman with known history of hemorrhagic CVA, hypertension, carotid artery disease and recently diagnosed obstructive sleep apnea  He was noted to have transient bradycardia and junctional rhythm during his sleep study  He gives no symptoms that suggest daytime dysrhythmia with symptoms or atrial fibrillation  His blood pressure is currently well controlled  His ECG shows sinus arrhythmia and premature atrial contractions  His left ventricular function is normal   His last echocardiogram in October 2021 showed that his pulmonary artery pressure was close to upper limit of normal     I suspect junctional bradycardia and his sleep is related to his under corrected sleep apnea  Treatment for this would be to treat her sleep apnea  -- I am requesting a 48 hour Holter monitor to assess his cardiac rhythm  -- I am advising him to continue his current medications  -- as his cardiac physical examination is benign I am withholding from repeating an echocardiogram   -- I am advising him to reach out to his sleep medicine specialist is and CPAP suppliers to discuss possibility of changing the mask RV adjusting the mask for fitness  I emphasized with him the importance of using the CPAP consistently as it is going to prevent him from developing arrhythmias in the future as well as developing pulmonary hypertension  -- he has continued advised to continue normal activities and regular exercise    -- Dietary and medical compliance are reinforced  -- He is advised  to report any concerning symptoms such as chest pain, shortness of breath, decline in exercise tolerance or presyncope/syncope

## 2022-11-18 ENCOUNTER — VBI (OUTPATIENT)
Dept: ADMINISTRATIVE | Facility: OTHER | Age: 77
End: 2022-11-18

## 2022-11-21 ENCOUNTER — HOSPITAL ENCOUNTER (OUTPATIENT)
Dept: NON INVASIVE DIAGNOSTICS | Facility: HOSPITAL | Age: 77
Discharge: HOME | End: 2022-11-21
Attending: INTERNAL MEDICINE

## 2022-11-21 DIAGNOSIS — I10 PRIMARY HYPERTENSION: ICD-10-CM

## 2022-11-21 DIAGNOSIS — R00.1 BRADYCARDIA: ICD-10-CM

## 2022-11-30 ENCOUNTER — TELEPHONE (OUTPATIENT)
Dept: VASCULAR SURGERY | Facility: CLINIC | Age: 77
End: 2022-11-30

## 2022-11-30 NOTE — TELEPHONE ENCOUNTER
Reviewed  CV duplex done in April 2022 shows chronic R ICA occlusion with <50% L ICA stenosis  Recommend d/c CV duplex for 12/7/22 and repeat in April 2023

## 2022-11-30 NOTE — TELEPHONE ENCOUNTER
Received a call from [place], at [facility]  Pt is scheduled for Carotid duplex 12/7/22  Pt had carotid duplex on 4/13/22, and letter in [place] states to repeat it in one year, after 4/13/23  Routed to triage to advise if carotid duplex should be done 12/7/22

## 2022-12-01 NOTE — TELEPHONE ENCOUNTER
Called pt and s/w Manuel Jacobs and informed of same  Per Manuel Jacobs this CV doppler was ordered by Dr Hyacinth Rinne, his neurologist, and he would like it done now  Confirmed in chart it was ordered by Dr Hyacinth Rinne, they will keep CV doppler as planned  [facility] vascular lab and s/w Pricila and informed of same

## 2022-12-06 ENCOUNTER — OFFICE VISIT (OUTPATIENT)
Dept: FAMILY MEDICINE CLINIC | Facility: CLINIC | Age: 77
End: 2022-12-06

## 2022-12-06 VITALS
DIASTOLIC BLOOD PRESSURE: 68 MMHG | HEIGHT: 70 IN | BODY MASS INDEX: 29.2 KG/M2 | TEMPERATURE: 97.9 F | SYSTOLIC BLOOD PRESSURE: 124 MMHG | WEIGHT: 204 LBS

## 2022-12-06 DIAGNOSIS — G47.33 OBSTRUCTIVE SLEEP APNEA SYNDROME: ICD-10-CM

## 2022-12-06 DIAGNOSIS — S39.012A STRAIN OF LUMBAR REGION, INITIAL ENCOUNTER: Primary | ICD-10-CM

## 2022-12-06 NOTE — PROGRESS NOTES
Chief Complaint   Patient presents with   • Back Pain      X 4-5 days      Name: Paris Sánchez      : 1945      MRN: [id number]  Encounter Provider: Norma Huynh DO  Encounter Date: 2022   Encounter department: [facility]    Assessment & Plan     1  Strain of lumbar region, initial encounter  Comments:  Recommend Tylenol, heat 3 times daily  2  Obstructive sleep apnea syndrome  Comments: Follow-up with sleep doctor  Subjective      He is complaining of right-sided low back pain that started approximately 4-5 days ago  Started the day after he had worked on replacing an overhead light on a ladder  Actually feels much better this morning  While sitting in the chair, started to come back a little bit  Denies any weakness in the legs, numbness tingling, loss of bowel or bladder  No similar prior episodes  He is also complaining that his sleep apnea mask does not fit well  After an hour it starts to leak any has to take it off  With that he is complaining of some headaches, twitching of his eye, and feeling that he is tired all the time  Review of Systems   Constitutional: Positive for activity change, fatigue and unexpected weight change  Negative for appetite change, chills and fever  HENT: Negative for ear pain and sore throat  Eyes: Negative for pain and visual disturbance  Respiratory: Negative for cough and shortness of breath  Cardiovascular: Negative for chest pain and palpitations  Gastrointestinal: Negative for abdominal pain and vomiting  Genitourinary: Negative for dysuria and hematuria  Musculoskeletal: Positive for back pain and myalgias  Negative for arthralgias, gait problem, joint swelling, neck pain and neck stiffness  Skin: Negative for color change and rash  Neurological: Positive for headaches  Negative for seizures and syncope  All other systems reviewed and are negative        Current Outpatient Medications on File Prior to Visit   Medication Sig   • acetaminophen (TYLENOL) 325 mg tablet Take 650 mg by mouth every 6 (six) hours as needed for mild pain As needed   • amLODIPine (NORVASC) 10 mg tablet TAKE 1 TABLET BY MOUTH EVERY DAY   • atorvastatin (LIPITOR) 40 mg tablet TAKE 1 TABLET BY MOUTH DAILY WITH DINNER   • clopidogrel (PLAVIX) 75 mg tablet TAKE 1 TABLET BY MOUTH EVERY DAY   • escitalopram (Lexapro) 10 mg tablet 1/2 tab daily for 1 week, if no side effects increase to 1 tab daily   • fluticasone (FLONASE) 50 mcg/act nasal spray 1 spray into each nostril daily   • losartan (COZAAR) 25 mg tablet TAKE 1 TABLET (25 MG TOTAL) BY MOUTH DAILY  Objective     /68   Temp 97 9 °F (36 6 °C)   Ht 5' 10" (1 778 m)   Wt 92 5 kg (204 lb)   BMI 29 27 kg/m²     Physical Exam  Vitals and nursing note reviewed  Constitutional:       General: He is not in acute distress  Appearance: Normal appearance  He is well-developed and well-nourished  HENT:      Head: Normocephalic and atraumatic  Eyes:      Extraocular Movements: EOM normal       Conjunctiva/sclera: Conjunctivae normal    Cardiovascular:      Rate and Rhythm: Normal rate and regular rhythm  Pulses: Normal pulses  Heart sounds: Normal heart sounds  Pulmonary:      Effort: Pulmonary effort is normal       Breath sounds: Normal breath sounds  Abdominal:      General: Abdomen is flat  Musculoskeletal:      Lumbar back: Spasms and tenderness present  Normal range of motion  Negative right straight leg raise test and negative left straight leg raise test    Skin:     Capillary Refill: Capillary refill takes less than 2 seconds  Neurological:      General: No focal deficit present  Mental Status: He is alert and oriented to person, place, and time  Mental status is at baseline     Psychiatric:         Mood and Affect: Mood and affect and mood normal          Judgment: Judgment normal        Electa Cables, DO

## 2022-12-07 ENCOUNTER — HOSPITAL ENCOUNTER (OUTPATIENT)
Dept: NON INVASIVE DIAGNOSTICS | Facility: CLINIC | Age: 77
Discharge: HOME | End: 2022-12-07

## 2022-12-07 DIAGNOSIS — I65.22 CAROTID STENOSIS, LEFT: ICD-10-CM

## 2022-12-08 ENCOUNTER — TRANSCRIBE ORDERS (OUTPATIENT)
Dept: VASCULAR SURGERY | Facility: CLINIC | Age: 77
End: 2022-12-08

## 2022-12-08 DIAGNOSIS — I65.22 CAROTID STENOSIS, LEFT: Primary | ICD-10-CM

## 2022-12-13 ENCOUNTER — TELEPHONE (OUTPATIENT)
Dept: SLEEP CENTER | Facility: CLINIC | Age: 77
End: 2022-12-13

## 2022-12-13 NOTE — TELEPHONE ENCOUNTER
Patient's wife left voice mail stating his Cpap pressures are to high and he can not tolerate after and hour of using each night  Returned pt's call  Pt's wife requested to talk to the nursing staff   Pt is scheduled for office visit 12/27/22

## 2022-12-14 NOTE — TELEPHONE ENCOUNTER
Patient's wife left message asking for call back at [phone]  Returned call at that number but had to leave call back message  Called [phone] but also had to leave call back message

## 2022-12-14 NOTE — TELEPHONE ENCOUNTER
Left message for wife to call back to discuss further  Email sent to [facility] liaison requesting compliance data  May offer sooner appointment with Mary Sanz if available  Mary Sanz currently has openings this Friday 12/16/22 in [place]

## 2022-12-15 DIAGNOSIS — Z78.9 DIFFICULTY USING CONTINUOUS POSITIVE AIRWAY PRESSURE (CPAP) DEVICE: Primary | ICD-10-CM

## 2022-12-15 NOTE — TELEPHONE ENCOUNTER
[facility]  Pressure change ordered   Needs appointment with Tyrone MUÑOZ  See note for additional patient instructions   Patient reports that he is unable to tolerate the pressure of his CPAP  Compliance report only has 5 days of use since start of therapy in November  P95 is at 15  Tolerated 13cm during the titration study, but saturations were 91% at 12cm  Will order pressure change of 7-12cm  Patient will need to bring equipment in to have pressure changed - has WANDA  Starting tonight, recommend he make sure head gear is tight enough to avoid air leaks, which will cause the pressure to increase unnecessarily and if tight enough and still  having air leaks, plan to have a mask fitting while at appointment as well   -----------------------------------------------------    Called patient and spoke to wife Maddi Avalos  Advised of order for pressure change as well as Pretty's recommendations regarding mask fit  Scheduled pressure change appointment tomorrow 12/16/22 in [place]  Maddi Avalos would like mask fitting as well and plans to bring patient's current mask with him  [facility] [employer]

## 2022-12-15 NOTE — Clinical Note
Pressure change ordered  Needs appointment with Tyrone MUÑOZ   See note for additional patient instructions

## 2022-12-15 NOTE — TELEPHONE ENCOUNTER
Returned call from patient's wife Valentino Alegria  She states patient can only tolerate the wearing the machine for about an hour and a half  The air starts blowing too hard and he has to take it off  This has been going on for the past month since he got the machine  He would like to continue trying to use it  Advised I will send data to Howie Gloria to review and one of the nurses will call her back with Pretty's recommendations  I advised wife that in the meantime, patient should try to get 4 hours of use per night to be compliant per insurance  He can wear in the evenings while relaxing and watching TV  Wife states he will try  Patient is scheduled for a follow up on 12/27/22 and wishes to keep the appointment  Howie Gloria, please review compliance and advise

## 2022-12-15 NOTE — PROGRESS NOTES
Patient reports that he is unable to tolerate the pressure of his CPAP  Compliance report only has 5 days of use since start of therapy in November  P95 is at 15  Tolerated 13cm during the titration study, but saturations were 91% at 12cm  Will order pressure change of 7-12cm  Patient will need to bring equipment in to have pressure changed - has WANDA  Starting tonight, recommend he make sure head gear is tight enough to avoid air leaks, which will cause the pressure to increase unnecessarily and if tight enough and still  having air leaks, plan to have a mask fitting while at appointment as well

## 2022-12-21 DIAGNOSIS — I63.81 LEFT SIDED LACUNAR INFARCTION (HCC): ICD-10-CM

## 2022-12-21 DIAGNOSIS — E78.5 HYPERLIPIDEMIA: ICD-10-CM

## 2022-12-21 DIAGNOSIS — I65.21 STENOSIS OF RIGHT CAROTID ARTERY: ICD-10-CM

## 2022-12-21 DIAGNOSIS — I61.9 HEMORRHAGIC STROKE (HCC): ICD-10-CM

## 2022-12-21 RX ORDER — ATORVASTATIN CALCIUM 40 MG/1
TABLET, FILM COATED ORAL
Qty: 90 TABLET | Refills: 1 | Status: SHIPPED | OUTPATIENT
Start: 2022-12-21

## 2022-12-21 RX ORDER — CLOPIDOGREL BISULFATE 75 MG/1
TABLET ORAL
Qty: 90 TABLET | Refills: 1 | Status: SHIPPED | OUTPATIENT
Start: 2022-12-21

## 2022-12-22 ENCOUNTER — VBI (OUTPATIENT)
Dept: ADMINISTRATIVE | Facility: OTHER | Age: 77
End: 2022-12-22

## 2023-01-20 ENCOUNTER — OFFICE VISIT (OUTPATIENT)
Dept: SLEEP CENTER | Facility: CLINIC | Age: 78
End: 2023-01-20

## 2023-01-20 VITALS
HEIGHT: 70 IN | WEIGHT: 212.2 LBS | BODY MASS INDEX: 30.38 KG/M2 | SYSTOLIC BLOOD PRESSURE: 124 MMHG | DIASTOLIC BLOOD PRESSURE: 70 MMHG

## 2023-01-20 DIAGNOSIS — Z78.9 DIFFICULTY USING CONTINUOUS POSITIVE AIRWAY PRESSURE (CPAP) DEVICE: ICD-10-CM

## 2023-01-20 DIAGNOSIS — G47.33 OBSTRUCTIVE SLEEP APNEA TREATED WITH CONTINUOUS POSITIVE AIRWAY PRESSURE (CPAP): Primary | ICD-10-CM

## 2023-01-20 DIAGNOSIS — Z99.89 OBSTRUCTIVE SLEEP APNEA TREATED WITH CONTINUOUS POSITIVE AIRWAY PRESSURE (CPAP): Primary | ICD-10-CM

## 2023-01-20 NOTE — PATIENT INSTRUCTIONS
1   Continue use of CPAP equipment nightly  2  Continue to clean your equipment, as discussed  3  Contact the Sleep [address] [place] [address] with any questions or concerns prior to your next visit, as needed  4  Schedule visit for follow-up in 6 months    Uriah Arshadiagregg  Resupply orderinBassem Pat Contact:  [phone]    Suggested PAP supply Replacement Frequency  Disposable filters    Arther Soja every 2 weeks  Replaceable nasal mask cushions    Arther Soja every 2 weeks  Replaceable full face cushions    Arther Soja every 1 month  Mask    Arther Soja every 3 months  Tubing    Arther Soja every 3 months  Head Gear    Arther Soja every 6 months  Water chamber    Arther Soja every 6 months    After hours emergency:  [phone]      Nursing Support:  When: Monday through Friday 7A-5PM except holidays  Where: Our direct line is [phone]  If you are having a true emergency please call 911  In the event that the line is busy or it is after hours please leave a voice message and we will return your call  Please speak clearly, leaving your full name, birth date, best number to reach you and the reason for your call  Medication refills: We will need the name of the medication, the dosage, the ordering provider, whether you get a 30 or 90 day refill, and the pharmacy name and address  Medications will be ordered by the provider only  Nurses cannot call in prescriptions  Please allow 7 days for medication refills  Physician requested updates: If your provider requested that you call with an update after starting medication, please be ready to provide us the medication and dosage, what time you take your medication, the time you attempt to fall asleep, time you fall asleep, when you wake up, and what time you get out of bed  Sleep Study Results:  We will contact you with sleep study results and/or next steps after the physician has reviewed your testing

## 2023-01-20 NOTE — PROGRESS NOTES
Progress Note - Karina [age] y o  male   :1945, MRN: [id number]  2023          Follow Up Evaluation / Problem:     Mild obstructive Sleep Apnea      Thank you for the opportunity of participating in the evaluation and care of this patient in the Sleep Clinic at [facility]  HPI: Geovanny Gil is a 76y o  year old male  The patient presents with his wife for follow up of mild obstructive sleep apnea  He had concern of excessive daytime sleepiness and restlessness during sleep  He completed a home sleep study in 2022  ADARSH was 10 6 with an oxygen mariia of 87% with 4% of the study spent with saturations below 90%  During the consultation his wife also reported evidence of acting out of dreams including movement of his arms and yelling at times  A CPAP titration study with evaluation of possible REM behavior disorder was completed in 2022  He was set up with CPAP equipment in 2022  He presents to review the results of his sleep study as well as compliance with use of the equipment and effectiveness of treatment  Current Outpatient Medications:   •  acetaminophen (TYLENOL) 325 mg tablet, Take 650 mg by mouth every 6 (six) hours as needed for mild pain As needed, Disp: , Rfl:   •  amLODIPine (NORVASC) 10 mg tablet, TAKE 1 TABLET BY MOUTH EVERY DAY, Disp: 90 tablet, Rfl: 1  •  atorvastatin (LIPITOR) 40 mg tablet, TAKE 1 TABLET BY MOUTH EVERY DAY WITH DINNER, Disp: 90 tablet, Rfl: 1  •  clopidogrel (PLAVIX) 75 mg tablet, TAKE 1 TABLET BY MOUTH EVERY DAY, Disp: 90 tablet, Rfl: 1  •  fluticasone (FLONASE) 50 mcg/act nasal spray, 1 spray into each nostril daily, Disp: 16 g, Rfl: 5  •  losartan (COZAAR) 25 mg tablet, TAKE 1 TABLET (25 MG TOTAL) BY MOUTH DAILY  , Disp: 90 tablet, Rfl: 1  •  escitalopram (Lexapro) 10 mg tablet, 1/2 tab daily for 1 week, if no side effects increase to 1 tab daily (Patient not taking: Reported on 1/20/2023), Disp: 30 tablet, Rfl: 3    Chana Sleepiness Scale  Sitting and reading: High chance of dozing  Watching TV: High chance of dozing  Sitting, inactive in a public place (e g  a theatre or a meeting): Would never doze  As a passenger in a car for an hour without a break: Would never doze  Lying down to rest in the afternoon when circumstances permit: Moderate chance of dozing  Sitting and talking to someone: Would never doze  Sitting quietly after a lunch without alcohol: Would never doze  In a car, while stopped for a few minutes in traffic: Would never doze  Total score: 8              Vitals:    01/20/23 1117   BP: 124/70   BP Location: Right arm   Patient Position: Sitting   Cuff Size: Large   Weight: 96 3 kg (212 lb 3 2 oz)   Height: 5' 10" (1 778 m)       Body mass index is 30 45 kg/m²  EPWORTH SLEEPINESS SCORE  Total score: 8      Past History Since Last Sleep Center Visit:   He denies any significant changes to his health since his consultation  He received his CPAP equipment in October and began using it  He had difficulty tolerating the initial pressure setting  After adjustments were made, he was able to begin using the equipment  He continues to adjust to the use of the equipment  At times he has air leaks that because the equipment setting to become overwhelming, with worsening of air leaks  He is able to initiate sleep with the CPAP mask and is able to sleep for longer periods of time while wearing it  His wife has noticed that he is not as restless when sleeping and he seems more awake during the day  The patient reports that he cleans the equipment appropriately, using mild soap and water        The review of systems and following portions of the patient's history were reviewed and updated as appropriate: allergies, current medications, past family history, past medical history, past social history, past surgical history, and problem list         OBJECTIVE  Equipment set up date: 10/31/2022  PAP Pressure: Nasal AutoPAP using a lower limit of 9 cm and an upper limit of 13 cm of water pressure, ramp starting at 7 cm    (Note:  Set up was for 12-15cm, based on the titration study, pressure change ordered to 7-12cm, however, pressure change did not go through)  Type of mask used: full face  DME Provider: [facility]    Physical Exam:     General Appearance:   Alert, cooperative, no distress, appears stated age, obese     Head:   Normocephalic, without obvious abnormality, atraumatic     Lungs:    Heart:  Clear to auscultation bilaterally, respirations unlabored      Regular rate and rhythm, S1 and S2 normal, no murmur, rub or gallop          ASSESSMENT / PLAN    1  Obstructive sleep apnea treated with continuous positive airway pressure (CPAP)  PAP DME Pressure Change    PAP DME Resupply/Reorder      2  Difficulty using continuous positive airway pressure (CPAP) device  PAP DME Pressure Change    PAP DME Resupply/Reorder              Counseling / Coordination of Care  Total clinic time spent today 30 minutes  Greater than 50% of total time was spent with the patient and / or family counseling and / or coordination of care  A description of the counseling / coordination of care:     Impressions, Diagnostic results, Prognosis, Instructions for management, Risks and benefits of treatment, Patient and family education, Risk factor reductions and Importance of compliance with treatment    Today I reviewed the results of the patient's sleep study  No RBD was noted  We then reviewed the patient's compliance data  he has been able to use the equipment 96 7% of all days recorded  Average usage was 4 or more hours 73 3% of all days recorded  The patient uses the equipment for an average of 6 hours and 11 minutes per night  The estimated AHI is 6 4 abnormal breathing events per hour, including 4 4 CA    Data from titration study was reviewed  A pressure change has been ordered to APAP 9-13cm with ramp at 7cm  The patient feels they benefit from the use of PAP equipment and would like to continue PAP therapy  Response to treatment has been fair, due to pressure settings  We discussed that if mask leaks persist after pressure change, a mask fitting is recommended with possible additional setting changes needed  A prescription for supplies has been provided to last for the next year  He will continue using this equipment at the settings noted above for the next 6 months  At that timehe will then return for a routine follow-up evaluation  I have asked the patient to contact the [address] if he encounters any difficulties prior to that time  The following instructions have been given to the patient today:    Patient Instructions   1  Continue use of CPAP equipment nightly  2  Continue to clean your equipment, as discussed  3  Contact the [address] with any questions or concerns prior to your next visit, as needed  4  Schedule visit for follow-up in 6 months    Uriah Morales  Resupply orderinBassem Pat Contact:  [phone]    Suggested PAP supply Replacement Frequency  Disposable filters    Magdy Corinne every 2 weeks  Replaceable nasal mask cushions    Magdy Corinne every 2 weeks  Replaceable full face cushions    Magdy Corinne every 1 month  Mask    Magdy Corinne every 3 months  Tubing    Magdy Corinne every 3 months  Head Gear    Magdy Corinne every 6 months  Water chamber    Magdy Corinne every 6 months    After hours emergency:  [phone]      Nursing Support:  When: Monday through Friday 7A-5PM except holidays  Where: Our direct line is [phone]  If you are having a true emergency please call 911    In the event that the line is busy or it is after hours please leave a voice message and we will return your call  Please speak clearly, leaving your full name, birth date, best number to reach you and the reason for your call  Medication refills: We will need the name of the medication, the dosage, the ordering provider, whether you get a 30 or 90 day refill, and the pharmacy name and address  Medications will be ordered by the provider only  Nurses cannot call in prescriptions  Please allow 7 days for medication refills  Physician requested updates: If your provider requested that you call with an update after starting medication, please be ready to provide us the medication and dosage, what time you take your medication, the time you attempt to fall asleep, time you fall asleep, when you wake up, and what time you get out of bed  Sleep Study Results: We will contact you with sleep study results and/or next steps after the physician has reviewed your testing  Madonna Hebert, [address] [place] [address]      Portions of the record may have been created with voice recognition software  Occasional wrong word or "sound a like" substitutions may have occurred due to the inherent limitations of voice recognition software  Read the chart carefully and recognize, using context, where substitutions have occurred

## 2023-01-23 ENCOUNTER — TELEPHONE (OUTPATIENT)
Dept: SLEEP CENTER | Facility: CLINIC | Age: 78
End: 2023-01-23

## 2023-01-24 LAB

## 2023-01-27 ENCOUNTER — TELEPHONE (OUTPATIENT)
Dept: FAMILY MEDICINE CLINIC | Facility: CLINIC | Age: 78
End: 2023-01-27

## 2023-01-27 ENCOUNTER — OFFICE VISIT (OUTPATIENT)
Dept: FAMILY MEDICINE CLINIC | Facility: CLINIC | Age: 78
End: 2023-01-27

## 2023-01-27 VITALS
DIASTOLIC BLOOD PRESSURE: 80 MMHG | BODY MASS INDEX: 30.35 KG/M2 | TEMPERATURE: 97.2 F | WEIGHT: 212 LBS | SYSTOLIC BLOOD PRESSURE: 140 MMHG | HEIGHT: 70 IN

## 2023-01-27 DIAGNOSIS — E78.2 MIXED HYPERLIPIDEMIA: ICD-10-CM

## 2023-01-27 DIAGNOSIS — Z99.89 OBSTRUCTIVE SLEEP APNEA TREATED WITH CONTINUOUS POSITIVE AIRWAY PRESSURE (CPAP): ICD-10-CM

## 2023-01-27 DIAGNOSIS — R06.02 SHORTNESS OF BREATH: ICD-10-CM

## 2023-01-27 DIAGNOSIS — G47.33 OBSTRUCTIVE SLEEP APNEA TREATED WITH CONTINUOUS POSITIVE AIRWAY PRESSURE (CPAP): ICD-10-CM

## 2023-01-27 DIAGNOSIS — R60.0 BILATERAL LOWER EXTREMITY EDEMA: Primary | ICD-10-CM

## 2023-01-27 DIAGNOSIS — I10 PRIMARY HYPERTENSION: ICD-10-CM

## 2023-01-27 NOTE — PROGRESS NOTES
Name: Lyle Homans      : 1945      MRN: [id number]  Encounter Provider: Roger Infante MD  Encounter Date: 2023   Encounter department: [address] [place] [address]     Chief Complaint   Patient presents with   • Leg Swelling     Right lower leg worse than left leg     1+ pitting edema bilateral lower extremities  Reviewed his previous echo from  that did show an ejection fraction of 12% but with diastolic dysfunction  Will order echocardiogram to see if there are any changes  Recommend patient keep legs elevated and try compression stocking  Continue blood pressure medications as ordered  Continue statin as ordered  Recommend patient to continue compliance with CPAP machine nightly  We will call patient with results  Follow-up as scheduled or as needed  1  Bilateral lower extremity edema  -     Echo complete w/ contrast if indicated; Future; Expected date: 2023    2  Shortness of breath  -     Echo complete w/ contrast if indicated; Future; Expected date: 2023    3  Primary hypertension    4  Mixed hyperlipidemia    5  Obstructive sleep apnea treated with continuous positive airway pressure (CPAP)         Subjective      He presents today after noticing swelling of his bilateral lower extremities this morning  Denies any shortness of breath  States that overall he feels well  Wife does report that yesterday when he was bringing groceries and he seemed short of breath  Has been compliant with his medications  Review of Systems   Constitutional: Negative for activity change, appetite change, chills, fatigue and fever  HENT: Negative for congestion, rhinorrhea, sneezing and sore throat  Respiratory: Positive for shortness of breath (on exertion)  Negative for cough, chest tightness and wheezing  Cardiovascular: Negative for chest pain and palpitations     Gastrointestinal: Negative for abdominal pain, constipation, diarrhea, nausea and vomiting  Musculoskeletal: Negative for arthralgias, gait problem, myalgias and neck pain  Skin: Positive for color change  Neurological: Negative for dizziness, weakness, numbness and headaches  All other systems reviewed and are negative  Current Outpatient Medications on File Prior to Visit   Medication Sig   • acetaminophen (TYLENOL) 325 mg tablet Take 650 mg by mouth every 6 (six) hours as needed for mild pain As needed   • amLODIPine (NORVASC) 10 mg tablet TAKE 1 TABLET BY MOUTH EVERY DAY   • atorvastatin (LIPITOR) 40 mg tablet TAKE 1 TABLET BY MOUTH EVERY DAY WITH DINNER   • clopidogrel (PLAVIX) 75 mg tablet TAKE 1 TABLET BY MOUTH EVERY DAY   • losartan (COZAAR) 25 mg tablet TAKE 1 TABLET (25 MG TOTAL) BY MOUTH DAILY  • fluticasone (FLONASE) 50 mcg/act nasal spray 1 spray into each nostril daily (Patient not taking: Reported on 1/27/2023)   • [DISCONTINUED] escitalopram (Lexapro) 10 mg tablet 1/2 tab daily for 1 week, if no side effects increase to 1 tab daily (Patient not taking: Reported on 1/20/2023)       Objective     /80 (BP Location: Right arm, Patient Position: Sitting, Cuff Size: Standard)   Temp (!) 97 2 °F (36 2 °C)   Ht 5' 10" (1 778 m)   Wt 96 2 kg (212 lb)   BMI 30 42 kg/m²     Physical Exam  Vitals reviewed  Constitutional:       General: He is not in acute distress  Appearance: Normal appearance  He is well-developed  He is not toxic-appearing or diaphoretic  HENT:      Head: Normocephalic and atraumatic  Nose: Nose normal  No congestion or rhinorrhea  Mouth/Throat:      Pharynx: No oropharyngeal exudate or posterior oropharyngeal erythema  Eyes:      General: No scleral icterus  Right eye: No discharge  Left eye: No discharge  Conjunctiva/sclera: Conjunctivae normal    Cardiovascular:      Rate and Rhythm: Normal rate and regular rhythm  Pulses: Normal pulses  Heart sounds: Normal heart sounds   No murmur heard   Pulmonary:      Effort: Pulmonary effort is normal  No respiratory distress  Breath sounds: Normal breath sounds  No wheezing  Abdominal:      General: Bowel sounds are normal  There is no distension  Palpations: Abdomen is soft  Tenderness: There is no abdominal tenderness  Musculoskeletal:         General: No tenderness  Normal range of motion  Right lower leg: Edema present  Left lower leg: Edema present  Skin:     General: Skin is warm  Findings: Lesion (small punctate lesions lower extremities) present  No erythema or rash  Neurological:      Mental Status: He is alert     Psychiatric:         Mood and Affect: Mood normal          Behavior: Behavior normal        Alta Wilson MD

## 2023-02-13 ENCOUNTER — TELEPHONE (OUTPATIENT)
Dept: CARDIOLOGY CLINIC | Facility: CLINIC | Age: 78
End: 2023-02-13

## 2023-02-13 NOTE — TELEPHONE ENCOUNTER
----- Message from Jolanta Garcia MD sent at 2/12/2023  2:31 PM EST -----  Please advise patient that his recent Holter monitor was overall all right with some minor abnormalities which are not very concerning  We will follow-up as scheduled      Jolanta Garcia MD

## 2023-02-13 NOTE — TELEPHONE ENCOUNTER
Placed call to patient  Spoke with Mackenzie, patient's caregiver  Ok per communication consent  She verbalized understanding  Pt has echo 2/17/23, appt with Dr Vincenzo Gutiérrez 2/22/23

## 2023-02-17 ENCOUNTER — HOSPITAL ENCOUNTER (OUTPATIENT)
Dept: NON INVASIVE DIAGNOSTICS | Facility: HOSPITAL | Age: 78
Discharge: HOME | End: 2023-02-17
Attending: FAMILY MEDICINE

## 2023-02-17 VITALS
HEIGHT: 70 IN | DIASTOLIC BLOOD PRESSURE: 80 MMHG | HEART RATE: 56 BPM | SYSTOLIC BLOOD PRESSURE: 140 MMHG | WEIGHT: 212 LBS | BODY MASS INDEX: 30.35 KG/M2

## 2023-02-17 DIAGNOSIS — R06.02 SHORTNESS OF BREATH: ICD-10-CM

## 2023-02-17 DIAGNOSIS — R60.0 BILATERAL LOWER EXTREMITY EDEMA: ICD-10-CM

## 2023-02-17 LAB
AORTIC ROOT: 3.4 CM
APICAL FOUR CHAMBER EJECTION FRACTION: 65 %
ASCENDING AORTA: 3.7 CM
E WAVE DECELERATION TIME: 205 MS
FRACTIONAL SHORTENING: 34 % (ref 28–44)
INTERVENTRICULAR SEPTUM IN DIASTOLE (PARASTERNAL SHORT AXIS VIEW): 1.6 CM
INTERVENTRICULAR SEPTUM: 1.6 CM (ref 0.6–1.1)
LAAS-AP2: 23.2 CM2
LAAS-AP4: 20.9 CM2
LEFT ATRIUM AREA SYSTOLE SINGLE PLANE A4C: 24 CM2
LEFT ATRIUM SIZE: 5 CM
LEFT INTERNAL DIMENSION IN SYSTOLE: 2.9 CM (ref 2.1–4)
LEFT VENTRICULAR INTERNAL DIMENSION IN DIASTOLE: 4.4 CM (ref 3.5–6)
LEFT VENTRICULAR POSTERIOR WALL IN END DIASTOLE: 1.5 CM
LEFT VENTRICULAR STROKE VOLUME: 55 ML
LVSV (TEICH): 55 ML
MV E'TISSUE VEL-LAT: 9 CM/S
MV E'TISSUE VEL-SEP: 6 CM/S
MV PEAK A VEL: 0.36 M/S
MV PEAK E VEL: 72 CM/S
MV STENOSIS PRESSURE HALF TIME: 59 MS
MV VALVE AREA P 1/2 METHOD: 3.73 CM2
RIGHT ATRIUM AREA SYSTOLE A4C: 18.8 CM2
RIGHT VENTRICLE ID DIMENSION: 4.1 CM
SL CV LEFT ATRIUM LENGTH A2C: 6.2 CM
SL CV LV EF: 70
SL CV PED ECHO LEFT VENTRICLE DIASTOLIC VOLUME (MOD BIPLANE) 2D: 86 ML
SL CV PED ECHO LEFT VENTRICLE SYSTOLIC VOLUME (MOD BIPLANE) 2D: 31 ML
TR MAX PG: 22 MMHG
TR PEAK VELOCITY: 2.4 M/S
TRICUSPID ANNULAR PLANE SYSTOLIC EXCURSION: 2.9 CM
TRICUSPID VALVE PEAK REGURGITATION VELOCITY: 2.37 M/S

## 2023-02-22 ENCOUNTER — OFFICE VISIT (OUTPATIENT)
Dept: CARDIOLOGY CLINIC | Facility: CLINIC | Age: 78
End: 2023-02-22

## 2023-02-22 VITALS
SYSTOLIC BLOOD PRESSURE: 142 MMHG | HEIGHT: 70 IN | DIASTOLIC BLOOD PRESSURE: 60 MMHG | HEART RATE: 66 BPM | WEIGHT: 209.4 LBS | BODY MASS INDEX: 29.98 KG/M2

## 2023-02-22 DIAGNOSIS — I10 PRIMARY HYPERTENSION: ICD-10-CM

## 2023-02-22 DIAGNOSIS — I11.0 HYPERTENSIVE HEART DISEASE WITH CHRONIC DIASTOLIC CONGESTIVE HEART FAILURE (HCC): ICD-10-CM

## 2023-02-22 DIAGNOSIS — I50.32 HYPERTENSIVE HEART DISEASE WITH CHRONIC DIASTOLIC CONGESTIVE HEART FAILURE (HCC): ICD-10-CM

## 2023-02-22 DIAGNOSIS — I50.32 CHRONIC DIASTOLIC HEART FAILURE WITH PRESERVED EJECTION FRACTION (HCC): Primary | ICD-10-CM

## 2023-02-22 RX ORDER — FUROSEMIDE 20 MG/1
20 TABLET ORAL EVERY OTHER DAY
Qty: 45 TABLET | Refills: 3 | Status: SHIPPED | OUTPATIENT
Start: 2023-02-22

## 2023-02-22 RX ORDER — SPIRONOLACTONE 25 MG/1
12.5 TABLET ORAL DAILY
Qty: 45 TABLET | Refills: 3 | Status: SHIPPED | OUTPATIENT
Start: 2023-02-22

## 2023-02-22 NOTE — PROGRESS NOTES
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, October 2021  5  Bilateral carotid artery disease  6  Sensorineural hearing loss bilaterally  7  Chronic rhinitis  8  Recently diagnosed obstructive sleep apnea  9  Actinic keratosis  10  Diastolic dysfunction without heart failure  11  History of recurrent depression  12  Right rotator cuff injury following fall from bed  13  History of prostate CA, status post radical prostatectomy about 23 years back    48-HOUR HOLTER November 2022: Average heart rate was 66 bpm with minimum heart rate of 44 and maximum of 111  There was 22 hours 30 minutes of bradycardia  There was 5 minutes 42 seconds of tachycardia  Predominant rhythm was normal sinus  There was 1 6% supraventricular ectopy burden  There were 6 supraventricular runs with longest run of 5 beats and fastest being 150 bpm   There were 106 atrial pairs  Longest R-R interval was 1 8 seconds  There was no significant ventricular ectopy  ECHOCARDIOGRAM 2/17/2023: Mild concentric left ventricle hypertrophy, normal left ventricular systolic function, EF 91%, grade 2 diastolic dysfunction, normal right ventricle size and systolic function, mild left atrial cavity enlargement, trace aortic valve regurgitation, trace mitral valve and tricuspid valve regurgitation, trace pulmonic valve regurgitation, no pericardial effusion  No pulmonary hypertension         CURRENT ECG   No results found for this visit on 02/22/23  CARDIOLOGY ASSESSMENT & PLAN     1  Chronic diastolic heart failure with preserved ejection fraction (HCC)  furosemide (LASIX) 20 mg tablet    spironolactone (ALDACTONE) 25 mg tablet    TSH, 3rd generation with Free T4 reflex    COMPREHENSIVE METABOLIC EPINP(86)    NT-BNP PRO-BE,[facility] campuses only              CBC and Platelet      2  Primary hypertension        3  Hypertensive heart disease with chronic diastolic congestive heart failure ([facility] [address] )          Hypertensive heart disease with chronic diastolic congestive heart failure ([facility] [address] )  Wt Readings from Last 3 Encounters:   02/22/23 95 kg (209 lb 6 4 oz)   02/17/23 96 2 kg (212 lb)   01/27/23 96 2 kg (212 lb)     Mr Cat Flood is noted to have recent progressive weight gain and exertional shortness of breath and pedal edema  On examination there is no pulmonary vascular congestion but there is peripheral vascular congestion  Recent echocardiogram is significant for left ventricular hypertrophy and grade 2 diastolic dysfunction  There was no evidence of pulmonary hypertension  He has known obstructive sleep apnea and is compliant with use of CPAP  Blood pressure is slightly on the higher side  -- Today we reviewed the results of recent echocardiogram and the previous Holter test done in November  -- I am adding diuretic therapy and aldosterone antagonist therapy  -- I am advising him to take spironolactone 12 5 mg that is a half a pill of 25 mg, once daily in the morning  -- I am initiating furosemide therapy 20 mg every other day  -- Following 2 weeks weight I am advising him to have blood work done including comprehensive metabolic panel, NT proBNP/BNP level and CBC   -- I am advising him to continue normal activities and keep a diary of symptoms  -- I am advising him to decrease the intake of salt in food    I would like to reassess him in 6 weeks time and we will determine if we need to make further changes to his regimen  I am not adding beta-blocker at this time because he has a tendency toward slower heart rate  INTERVAL HISTORY & HISTORY OF PRESENT ILLNESS     Belkis Rebolledo is here for follow-up regarding his cardiac comorbidities which include: Premature atrial contractions with symptoms, primary hypertension, dyslipidemia, diastolic dysfunction without heart failure previously and other comorbidities  He was last seen by me November 2022  He was referred to undergo a Holter test   He is here for follow-up accompanied with his wife  Recently he has been noticing exertional shortness of breath occurring when he climbs a flight of stairs or does some exertional activity  Denies symptoms with walking  More recently has also noticed some swelling in his feet with the right lower extremity swelling more than the left  He saw his primary care physician recently who sent him for an echocardiogram and prescribed compression stockings  Other than this patient has not had any palpitations recently has not had any passing out or near passing out episodes  He sleeps in a recliner with his CPAP machine consistently and denies waking up from sleep out of breath  He denies orthopnea or PND episodes  He is able to continue his normal activity but does get short of breath with exertion which he has felt previously also  He has had no passing out or near passing out episodes  His wife mentions that she noticed that he was wheezing when they were walking the other day  Functional capacity status: Moderate   (Excellent- >10 METs; Good: (7-10 METs); Moderate (4-7 METs); Poor (<= 4 METs)    Any chronic stressors: None   (feeling of poor health, financial problems, and social isolation etc)  Tobacco or alcohol dependence: He has never been a smoker  He drinks very rarely      Current cardiac medications: Atorvastatin 40 mg daily, amlodipine 10 mg daily, losartan 25 mg daily clopidogrel 75 mg daily    Last blood work is from July 28, 2022  Sodium was 139 potassium 4 5 chloride 109 bicarb 27 BUN 15 creatinine 0 96 normal LFTs, total cholesterol 134 triglyceride 65 HDL C50 calculated LDL 71 hemoglobin A1c 5 8 TSH 1 55    REVIEW OF SYSTEMS   Positive for: Noted above in HPI  Negative for: All remaining as reviewed below and in HPI  SYSTEM SYMPTOMS REVIEWED:  General--weight change, fever, night sweats  Respiratory--cough, wheezing, shortness of breath, sputum production  Cardiovascular--chest pain, syncope, dyspnea on exertion, edema, decline in exercise tolerance, claudication   Gastrointestinal--persistent vomiting, diarrhea, abdominal distention, blood in stool   Muscular or skeletal--joint pain or swelling   Neurologic--headaches, syncope, abnormal movement  Hematologic--history of easy bruising and bleeding   Endocrine--thyroid enlargement, heat or cold intolerance, polyuria   Psychiatric--anxiety, depression     *-*-*-*-*-*-*-*-*-*-*-*-*-*-*-*-*-*-*-*-*-*-*-*-*-*-*-*-*-*-*-*-*-*-*-*-*-*-*-*-*-*-*-*-*-*-*-*-*-*-*-*-*-*-  VITAL SIGNS     CURRENT VITAL SIGNS:   Vitals:    02/22/23 1328   BP: 142/60   BP Location: Left arm   Patient Position: Sitting   Cuff Size: Large   Pulse: 66   Weight: 95 kg (209 lb 6 4 oz)   Height: 5' 10" (1 778 m)       BMI: Body mass index is 30 05 kg/m²      WEIGHTS:   Wt Readings from Last 25 Encounters:   02/22/23 95 kg (209 lb 6 4 oz)   02/17/23 96 2 kg (212 lb)   01/27/23 96 2 kg (212 lb)   01/20/23 96 3 kg (212 lb 3 2 oz)   12/06/22 92 5 kg (204 lb)   11/09/22 90 7 kg (200 lb)   10/31/22 89 8 kg (198 lb)   10/11/22 90 6 kg (199 lb 11 2 oz)   09/14/22 92 1 kg (203 lb)   07/29/22 92 5 kg (204 lb)   06/02/22 92 7 kg (204 lb 6 4 oz)   04/29/22 90 3 kg (199 lb)   04/08/22 90 3 kg (199 lb)   03/23/22 88 kg (194 lb)   01/27/22 86 2 kg (190 lb)   12/16/21 84 4 kg (186 lb)   11/29/21 84 4 kg (186 lb)   11/04/21 84 4 kg (186 lb)   11/02/21 85 3 kg (188 lb)   10/25/21 85 kg (187 lb 8 oz)   10/08/21 91 4 kg (201 lb 8 oz)   10/02/21 94 kg (207 lb 3 7 oz)   07/06/21 93 2 kg (205 lb 6 4 oz)   01/27/21 91 7 kg (202 lb 3 2 oz)   01/04/21 93 6 kg (206 lb 6 4 oz)        *-*-*-*-*-*-*-*-*-*-*-*-*-*-*-*-*-*-*-*-*-*-*-*-*-*-*-*-*-*-*-*-*-*-*-*-*-*-*-*-*-*-*-*-*-*-*-*-*-*-*-*-*-*-  PHYSICAL EXAM     General Appearance:    Alert, cooperative, no distress, appears stated age   Head, Eyes, ENT:    No obvious abnormality, moist mucous mebranes  Neck:   Supple, no carotid bruit or JVD   Back:     Symmetric, no curvature  Lungs:     Respirations unlabored  Clear to auscultation bilaterally,    Chest wall:    No tenderness or deformity   Heart:    Regular rate and rhythm, S1 and S2 normal, no murmur, rub  or gallop  Abdomen:     Soft, non-tender,    Extremities:   Extremities warm, no cyanosis, grade 1+ pedal edema slightly more on the right compared to left  Skin:   Male venostatic changes in lower extremities  Normal skin color, texture, and turgor  No rashes or lesions     *-*-*-*-*-*-*-*-*-*-*-*-*-*-*-*-*-*-*-*-*-*-*-*-*-*-*-*-*-*-*-*-*-*-*-*-*-*-*-*-*-*-*-*-*-*-*-*-*-*-*-*-*-*-  CURRENT MEDICATIONS LIST     Current Outpatient Medications:   •  acetaminophen (TYLENOL) 325 mg tablet, Take 650 mg by mouth every 6 (six) hours as needed for mild pain As needed, Disp: , Rfl:   •  amLODIPine (NORVASC) 10 mg tablet, TAKE 1 TABLET BY MOUTH EVERY DAY, Disp: 90 tablet, Rfl: 1  •  atorvastatin (LIPITOR) 40 mg tablet, TAKE 1 TABLET BY MOUTH EVERY DAY WITH DINNER, Disp: 90 tablet, Rfl: 1  •  clopidogrel (PLAVIX) 75 mg tablet, TAKE 1 TABLET BY MOUTH EVERY DAY, Disp: 90 tablet, Rfl: 1  •  fluticasone (FLONASE) 50 mcg/act nasal spray, 1 spray into each nostril daily, Disp: 16 g, Rfl: 5  •  furosemide (LASIX) 20 mg tablet, Take 1 tablet (20 mg total) by mouth every other day Take it in the morning , Disp: 45 tablet, Rfl: 3  •  losartan (COZAAR) 25 mg tablet, TAKE 1 TABLET (25 MG TOTAL) BY MOUTH DAILY  , Disp: 90 tablet, Rfl: 1  •  spironolactone (ALDACTONE) 25 mg tablet, Take 0 5 tablets (12 5 mg total) by mouth daily, Disp: 45 tablet, Rfl: 3       ALLERGIES     Allergies   Allergen Reactions   • Ace Inhibitors Cough       *-*-*-*-*-*-*-*-*-*-*-*-*-*-*-*-*-*-*-*-*-*-*-*-*-*-*-*-*-*-*-*-*-*-*-*-*-*-*-*-*-*-*-*-*-*-*-*-*-*-*-*-*-*-  LABORATORY DATA   No results found for: NA  Potassium   Date Value Ref Range Status   07/28/2022 4 5 3 5 - 5 3 mmol/L Final   04/21/2022 4 2 3 5 - 5 3 mmol/L Final   10/06/2021 4 9 3 5 - 5 3 mmol/L Final     Comment:     Slightly Hemolyzed; Results May be Affected&XA&Slightly Hemolyzed; Results May be Affected&XA&Slightly Hemolyzed; Results May be Affected&XA&Slightly Hemolyzed; Results May be Affected&XA&Slightly Hemolyzed;  Results May be Affected   07/06/2021 4 6 3 5 - 5 3 mmol/L Final     Chloride   Date Value Ref Range Status   07/28/2022 109 (H) 96 - 108 mmol/L Final   04/21/2022 108 100 - 108 mmol/L Final   10/06/2021 107 100 - 108 mmol/L Final   07/06/2021 104 98 - 110 mmol/L Final     CO2   Date Value Ref Range Status   07/28/2022 27 21 - 32 mmol/L Final   04/21/2022 29 21 - 32 mmol/L Final   10/06/2021 24 21 - 32 mmol/L Final   07/06/2021 26 20 - 32 mmol/L Final     BUN   Date Value Ref Range Status   07/28/2022 15 5 - 25 mg/dL Final   04/21/2022 17 5 - 25 mg/dL Final   10/06/2021 16 5 - 25 mg/dL Final   07/06/2021 13 7 - 25 mg/dL Final     Creatinine   Date Value Ref Range Status   07/28/2022 0 96 0 60 - 1 30 mg/dL Final     Comment:     Standardized to IDMS reference method   04/21/2022 1 01 0 60 - 1 30 mg/dL Final     Comment:     Standardized to IDMS reference method   10/06/2021 0 80 0 60 - 1 30 mg/dL Final     Comment:     Standardized to IDMS reference method     eGFR   Date Value Ref Range Status   07/28/2022 75 ml/min/1 73sq m Final   04/21/2022 70 ml/min/1 73sq m Final   10/06/2021 86 ml/min/1 73sq m Final     Calcium   Date Value Ref Range Status   07/28/2022 9 6 8 3 - 10 1 mg/dL Final   04/21/2022 9 4 8 3 - 10 1 mg/dL Final   10/06/2021 9 6 8 3 - 10 1 mg/dL Final   07/06/2021 9 4 8 6 - 10 3 mg/dL Final     AST   Date Value Ref Range Status   07/28/2022 19 5 - 45 U/L Final     Comment:     Specimen collection should occur prior to Sulfasalazine administration due to the potential for falsely depressed results  04/21/2022 19 5 - 45 U/L Final     Comment:     Specimen collection should occur prior to Sulfasalazine administration due to the potential for falsely depressed results  10/03/2021 20 5 - 45 U/L Final     Comment:     Specimen collection should occur prior to Sulfasalazine administration due to the potential for falsely depressed results  07/06/2021 22 10 - 35 U/L Final     ALT   Date Value Ref Range Status   07/28/2022 45 12 - 78 U/L Final     Comment:     Specimen collection should occur prior to Sulfasalazine and/or Sulfapyridine administration due to the potential for falsely depressed results  04/21/2022 38 12 - 78 U/L Final     Comment:     Specimen collection should occur prior to Sulfasalazine and/or Sulfapyridine administration due to the potential for falsely depressed results  10/03/2021 30 12 - 78 U/L Final     Comment:     Specimen collection should occur prior to Sulfasalazine and/or Sulfapyridine administration due to the potential for falsely depressed results  07/06/2021 35 9 - 46 U/L Final     Alkaline Phosphatase   Date Value Ref Range Status   07/28/2022 93 46 - 116 U/L Final   04/21/2022 99 46 - 116 U/L Final   10/03/2021 69 46 - 116 U/L Final   07/06/2021 82 35 - 144 U/L Final     Magnesium   Date Value Ref Range Status   10/06/2021 2 5 1 6 - 2 6 mg/dL Final     Comment:     Slightly Hemolyzed; Results May be Affected&XA&Slightly Hemolyzed; Results May be Affected&XA&Slightly Hemolyzed; Results May be Affected&XA&Slightly Hemolyzed; Results May be Affected&XA&Slightly Hemolyzed;  Results May be Affected   10/05/2021 2 5 1 6 - 2 6 mg/dL Final     Comment:     Slightly Hemolyzed; Results May be Affected   10/04/2021 2 3 1 6 - 2 6 mg/dL Final     WBC   Date Value Ref Range Status   04/21/2022 6 16 4 31 - 10 16 Thousand/uL Final   10/05/2021 6 92 4 31 - 10 16 Thousand/uL Final   10/04/2021 8 70 4 31 - 10 16 Thousand/uL Final     Hemoglobin   Date Value Ref Range Status   04/21/2022 15 1 12 0 - 17 0 g/dL Final   10/05/2021 15 6 12 0 - 17 0 g/dL Final   10/04/2021 14 5 12 0 - 17 0 g/dL Final     Platelets   Date Value Ref Range Status   04/21/2022 168 149 - 390 Thousands/uL Final   10/05/2021 159 149 - 390 Thousands/uL Final   10/04/2021 164 149 - 390 Thousands/uL Final     PTT   Date Value Ref Range Status   10/02/2021 34 23 - 37 seconds Final     Comment:     Therapeutic Heparin Range =  60-90 seconds   12/30/2020 35 23 - 37 seconds Final     Comment:     Therapeutic Heparin Range =  60-90 seconds     INR   Date Value Ref Range Status   10/02/2021 1 03 0 84 - 1 19 Final   12/30/2020 1 07 0 84 - 1 19 Final     No results found for: CKMB, DIGOXIN  TSH   Date Value Ref Range Status   07/06/2021 1 61 0 40 - 4 50 mIU/L Final     No results found for: CHOL   Hemoglobin A1C   Date Value Ref Range Status   07/28/2022 5 8 (H) Normal 3 8-5 6%; PreDiabetic 5 7-6 4%; Diabetic >=6 5%; Glycemic control for adults with diabetes <7 0% % Final     No results found for: Liv Betters, GRAMSTAIN, URINECX, WOUNDCULT, BODYFLUIDCUL, MRSACULTURE, INFLUAPCR, INFLUBPCR, RSVPCR, LEGIONELLAUR, CDIFFTOXINB    *-*-*-*-*-*-*-*-*-*-*-*-*-*-*-*-*-*-*-*-*-*-*-*-*-*-*-*-*-*-*-*-*-*-*-*-*-*-*-*-*-*-*-*-*-*-*-*-*-*-*-*-*-*-  PREVIOUS CARDIOLOGY & RADIOLOGY TEST RESULTS   I have personally reviewed pertinent results of cardiovascular tests noted below      Results for orders placed during the hospital encounter of 10/02/21    Echo complete with contrast if indicated    [address]  [place], [place] [address]  [phone]    Transthoracic Echocardiogram  2D, M-mode, Doppler, and Color Doppler    Study date:  03-Oct-2021    Patient: Pillo Diaz  MR number: [id number]  Account number: [de-identified]  : 1945  Age: 64 years  Gender: Male  Status: Inpatient  Location: Bedside  Height: 70 in  Weight: 205 9 lb  BP: 105/ 59 mmHg    Indications: Cerebral Infarction    Diagnoses: I63 9 - Cerebral infarction, unspecified    Sonographer:  Luis Altamirano RDCS  Primary Physician:  Virgil Castro DO  Referring Physician:  Mervat Bradshaw MD  Group:  [facility]  Interpreting Physician:  Emiliana Palmer MD    SUMMARY    LEFT VENTRICLE:  Systolic function was hyperdynamic by visual assessment  Ejection fraction was estimated to be 75 %  There were no regional wall motion abnormalities  Features were consistent with a pseudonormal left ventricular filling pattern, with concomitant abnormal relaxation and increased filling pressure (grade 2 diastolic dysfunction)  LEFT ATRIUM:  The atrium was moderately dilated  RIGHT ATRIUM:  The atrium was mildly dilated  MITRAL VALVE:  There was trace regurgitation  AORTIC VALVE:  There was trace regurgitation  TRICUSPID VALVE:  There was trace regurgitation  IVC, HEPATIC VEINS:  The inferior vena cava was dilated  HISTORY: PRIOR HISTORY: Hypertension, Prostate Cancer, PVC's    PROCEDURE: The procedure was performed at the bedside  This was a routine study  The transthoracic approach was used  The study included complete 2D imaging, M-mode, complete spectral Doppler, and color Doppler  The heart rate was 69 bpm,  at the start of the study  Images were obtained from the parasternal, apical, subcostal, and suprasternal notch acoustic windows  Image quality was adequate  LEFT VENTRICLE: Size was normal  Systolic function was hyperdynamic by visual assessment  Ejection fraction was estimated to be 75 %   There were no regional wall motion abnormalities  Wall thickness was normal  DOPPLER: Features were  consistent with a pseudonormal left ventricular filling pattern, with concomitant abnormal relaxation and increased filling pressure (grade 2 diastolic dysfunction)  RIGHT VENTRICLE: The size was normal  Systolic function was normal  Wall thickness was normal     LEFT ATRIUM: The atrium was moderately dilated  RIGHT ATRIUM: The atrium was mildly dilated  MITRAL VALVE: Valve structure was normal  There was normal leaflet separation  DOPPLER: The transmitral velocity was within the normal range  There was no evidence for stenosis  There was trace regurgitation  AORTIC VALVE: The valve was trileaflet  Leaflets exhibited normal thickness and normal cuspal separation  DOPPLER: Transaortic velocity was within the normal range  There was no evidence for stenosis  There was trace regurgitation  TRICUSPID VALVE: The valve structure was normal  There was normal leaflet separation  DOPPLER: The transtricuspid velocity was within the normal range  There was no evidence for stenosis  There was trace regurgitation  Pulmonary artery  systolic pressure was at the upper limits of normal  Estimated peak PA pressure was 35 mmHg  PULMONIC VALVE: Leaflets exhibited normal thickness, no calcification, and normal cuspal separation  DOPPLER: The transpulmonic velocity was within the normal range  There was no significant regurgitation  PERICARDIUM: There was no pericardial effusion  The pericardium was normal in appearance  AORTA: The root exhibited normal size  SYSTEMIC VEINS: IVC: The inferior vena cava was dilated   Respirophasic changes were normal     MEASUREMENT TABLES    OTHER ECHO MEASUREMENTS  (Reference normals)  Estimated CVP   10 mmHg   (--)    SYSTEM MEASUREMENT TABLES    2D  %FS: 24 66 %  Ao Diam: 3 71 cm  Ao asc: 3 43 cm  EDV(Teich): 60 63 ml  EF(Teich): 49 66 %  ESV(Teich): 30 52 ml  IVSd: 1 7 cm  LA Diam: 4 59 cm  LAAs A4C: 28 15 cm2  LAESV A-L A4C: 106 1 ml  LAESV MOD A4C: 102 28 ml  LALs A4C: 6 34 cm  LVEDV MOD A4C: 53 74 ml  LVEF MOD A4C: 57 21 %  LVESV MOD A4C: 22 99 ml  LVIDd: 3 77 cm  LVIDs: 2 84 cm  LVLd A4C: 7 98 cm  LVLs A4C: 6 98 cm  LVPWd: 1 31 cm  RAESV A-L: 57 71 ml  RAESV MOD: 53 66 ml  RALs: 6 51 cm  RVIDd: 2 87 cm  SV MOD A4C: 30 74 ml  SV(Teich): 30 11 ml    CW  AV Env  Ti: 312 08 ms  AV VTI: 25 01 cm  AV Vmax: 1 27 m/s  AV Vmean: 0 8 m/s  AV maxP 49 mmHg  AV meanPG: 3 19 mmHg  TR Vmax: 2 47 m/s  TR maxP 45 mmHg    MM  TAPSE: 2 62 cm    PW  E' Av 09 m/s  E' Lat: 0 11 m/s  E' Sept: 0 07 m/s  E/E' Avg: 10 3  E/E' Lat: 8 5  E/E' Sept: 13 08  LVOT Env  Ti: 274 02 ms  LVOT VTI: 20 88 cm  LVOT Vmax: 1 15 m/s  LVOT Vmean: 0 76 m/s  LVOT maxP 25 mmHg  LVOT meanP 75 mmHg  MV A Kyle: 0 45 m/s  MV Dec Gulf: 4 43 m/s2  MV DecT: 208 05 ms  MV E Kyle: 0 92 m/s  MV E/A Ratio: 2 06  MV PHT: 60 33 ms  MVA By PHT: 3 65 cm2    [address] [facility]    Prepared and electronically signed by    Faraz Dominguez MD  Signed 03-Oct-2021 14:07:17    No results found for this or any previous visit  No results found for this or any previous visit  No results found for this or any previous visit  Echo complete w/ contrast if indicated  •  Left Ventricle: Left ventricular cavity size is normal  Wall thickness   is mildly increased  There is mild concentric hypertrophy  The left   ventricular ejection fraction is 70%  Systolic function is vigorous  Wall   motion is normal  Diastolic function is moderately abnormal, consistent   with grade II (pseudonormal) relaxation  There is no LV dynamic   obstruction  •  Right Ventricle: Right ventricular cavity size is normal  Systolic   function is normal   •  Left Atrium: The atrium is mildly dilated          *-*-*-*-*-*-*-*-*-*-*-*-*-*-*-*-*-*-*-*-*-*-*-*-*-*-*-*-*-*-*-*-*-*-*-*-*-*-*-*-*-*-*-*-*-*-*-*-*-*-*-*-*-*-  SIGNATURES:   [unfilled]   Ather MD Marco; MHA    *-*-*-*-*-*-*-*-*-*-*-*-*-*-*-*-*-*-*-*-*-*-*-*-*-*-*-*-*-*-*-*-*-*-*-*-*-*-*-*-*-*-*-*-*-*-*-*-*-*-*-*-*-*-  PAST MEDICAL HISTORY:  Past Medical History:   Diagnosis Date   • Cancer [facility])     prostate   • Carotid stenosis    • Hypertension    • Left sided lacunar infarction ([facility] [address] ) 12/30/2020    PAST SURGICAL HISTORY:  Past Surgical History:   Procedure Laterality Date   • TONSILLECTOMY     • TRANSURETHRAL RESECTION OF PROSTATE           FAMILY HISTORY:  Family History   Problem Relation Age of Onset   • Colon cancer Mother    • No Known Problems Father    • Leukemia Maternal Grandmother    • Diabetes Maternal Grandmother    • Alcohol abuse Maternal Grandfather    • No Known Problems Paternal Grandmother    • No Known Problems Paternal Grandfather     SOCIAL HISTORY:  Social History     Tobacco Use   Smoking Status Former   Smokeless Tobacco Never      Social History     Substance and Sexual Activity   Alcohol Use Yes   • Alcohol/week: 4 0 standard drinks   • Types: 4 Cans of beer per week    Comment: several days a week     Social History     Substance and Sexual Activity   Drug Use Never    [unfilled]     *-*-*-*-*-*-*-*-*-*-*-*-*-*-*-*-*-*-*-*-*-*-*-*-*-*-*-*-*-*-*-*-*-*-*-*-*-*-*-*-*-*-*-*-*-*-*-*-*-*-*-*-*-*  ALLERGIES:  Allergies   Allergen Reactions   • Ace Inhibitors Cough    CURRENT SCHEDULED MEDICATIONS:    Current Outpatient Medications:   •  acetaminophen (TYLENOL) 325 mg tablet, Take 650 mg by mouth every 6 (six) hours as needed for mild pain As needed, Disp: , Rfl:   •  amLODIPine (NORVASC) 10 mg tablet, TAKE 1 TABLET BY MOUTH EVERY DAY, Disp: 90 tablet, Rfl: 1  •  atorvastatin (LIPITOR) 40 mg tablet, TAKE 1 TABLET BY MOUTH EVERY DAY WITH DINNER, Disp: 90 tablet, Rfl: 1  •  clopidogrel (PLAVIX) 75 mg tablet, TAKE 1 TABLET BY MOUTH EVERY DAY, Disp: 90 tablet, Rfl: 1  •  fluticasone (FLONASE) 50 mcg/act nasal spray, 1 spray into each nostril daily, Disp: 16 g, Rfl: 5  •  furosemide (LASIX) 20 mg tablet, Take 1 tablet (20 mg total) by mouth every other day Take it in the morning , Disp: 45 tablet, Rfl: 3  •  losartan (COZAAR) 25 mg tablet, TAKE 1 TABLET (25 MG TOTAL) BY MOUTH DAILY  , Disp: 90 tablet, Rfl: 1  •  spironolactone (ALDACTONE) 25 mg tablet, Take 0 5 tablets (12 5 mg total) by mouth daily, Disp: 45 tablet, Rfl: 3     *-*-*-*-*-*-*-*-*-*-*-*-*-*-*-*-*-*-*-*-*-*-*-*-*-*-*-*-*-*-*-*-*-*-*-*-*-*-*-*-*-*-*-*-*-*-*-*-*-*-*-*-*-*

## 2023-02-22 NOTE — ASSESSMENT & PLAN NOTE
Wt Readings from Last 3 Encounters:   02/22/23 95 kg (209 lb 6 4 oz)   02/17/23 96 2 kg (212 lb)   01/27/23 96 2 kg (212 lb)     Mr Hanna Carlos is noted to have recent progressive weight gain and exertional shortness of breath and pedal edema  On examination there is no pulmonary vascular congestion but there is peripheral vascular congestion  Recent echocardiogram is significant for left ventricular hypertrophy and grade 2 diastolic dysfunction  There was no evidence of pulmonary hypertension  He has known obstructive sleep apnea and is compliant with use of CPAP  Blood pressure is slightly on the higher side  -- Today we reviewed the results of recent echocardiogram and the previous Holter test done in November  -- I am adding diuretic therapy and aldosterone antagonist therapy  -- I am advising him to take spironolactone 12 5 mg that is a half a pill of 25 mg, once daily in the morning  -- I am initiating furosemide therapy 20 mg every other day  -- Following 2 weeks weight I am advising him to have blood work done including comprehensive metabolic panel, NT proBNP/BNP level and CBC   -- I am advising him to continue normal activities and keep a diary of symptoms  -- I am advising him to decrease the intake of salt in food  I would like to reassess him in 6 weeks time and we will determine if we need to make further changes to his regimen  I am not adding beta-blocker at this time because he has a tendency toward slower heart rate

## 2023-02-22 NOTE — PATIENT INSTRUCTIONS
CARDIOLOGY ASSESSMENT & PLAN     1  Chronic diastolic heart failure with preserved ejection fraction (HCC)  furosemide (LASIX) 20 mg tablet    spironolactone (ALDACTONE) 25 mg tablet    TSH, 3rd generation with Free T4 reflex    COMPREHENSIVE METABOLIC DKCNU(07)    NT-BNP PRO-BE, campus only              CBC and Platelet      2  Primary hypertension        3  Hypertensive heart disease with chronic diastolic congestive heart failure ([facility] [address] )          Hypertensive heart disease with chronic diastolic congestive heart failure ([facility] [address] )  Wt Readings from Last 3 Encounters:   02/22/23 95 kg (209 lb 6 4 oz)   02/17/23 96 2 kg (212 lb)   01/27/23 96 2 kg (212 lb)     Mr Chen Yang is noted to have recent progressive weight gain and exertional shortness of breath and pedal edema  On examination there is no pulmonary vascular congestion but there is peripheral vascular congestion  Recent echocardiogram is significant for left ventricular hypertrophy and grade 2 diastolic dysfunction  There was no evidence of pulmonary hypertension  He has known obstructive sleep apnea and is compliant with use of CPAP  Blood pressure is slightly on the higher side  -- Today we reviewed the results of recent echocardiogram and the previous Holter test done in November  -- I am adding diuretic therapy and aldosterone antagonist therapy  -- I am advising him to take spironolactone 12 5 mg that is a half a pill of 25 mg, once daily in the morning  -- I am initiating furosemide therapy 20 mg every other day  -- Following 2 weeks weight I am advising him to have blood work done including comprehensive metabolic panel, NT proBNP/BNP level and CBC   -- I am advising him to continue normal activities and keep a diary of symptoms  -- I am advising him to decrease the intake of salt in food  I would like to reassess him in 6 weeks time and we will determine if we need to make further changes to his regimen    I am not adding beta-blocker at this time because he has a tendency toward slower heart rate

## 2023-02-28 ENCOUNTER — OFFICE VISIT (OUTPATIENT)
Dept: FAMILY MEDICINE CLINIC | Facility: CLINIC | Age: 78
End: 2023-02-28

## 2023-02-28 VITALS
HEIGHT: 70 IN | SYSTOLIC BLOOD PRESSURE: 128 MMHG | BODY MASS INDEX: 29.49 KG/M2 | DIASTOLIC BLOOD PRESSURE: 74 MMHG | WEIGHT: 206 LBS

## 2023-02-28 DIAGNOSIS — R73.03 PREDIABETES: ICD-10-CM

## 2023-02-28 DIAGNOSIS — Z99.89 OBSTRUCTIVE SLEEP APNEA TREATED WITH CONTINUOUS POSITIVE AIRWAY PRESSURE (CPAP): ICD-10-CM

## 2023-02-28 DIAGNOSIS — I10 PRIMARY HYPERTENSION: Primary | ICD-10-CM

## 2023-02-28 DIAGNOSIS — I61.9 HEMORRHAGIC STROKE (HCC): ICD-10-CM

## 2023-02-28 DIAGNOSIS — I63.81 LEFT SIDED LACUNAR INFARCTION (HCC): ICD-10-CM

## 2023-02-28 DIAGNOSIS — E78.2 MIXED HYPERLIPIDEMIA: ICD-10-CM

## 2023-02-28 DIAGNOSIS — G47.33 OBSTRUCTIVE SLEEP APNEA TREATED WITH CONTINUOUS POSITIVE AIRWAY PRESSURE (CPAP): ICD-10-CM

## 2023-02-28 DIAGNOSIS — I50.32 CHRONIC DIASTOLIC HEART FAILURE WITH PRESERVED EJECTION FRACTION (HCC): ICD-10-CM

## 2023-02-28 NOTE — PROGRESS NOTES
Name: Malina Alston      : 1945      MRN: [id number]  Encounter Provider: Phillip Tariq DO  Encounter Date: 2023   Encounter department: [facility] PRIMARY CARE    Assessment & Plan     1  Primary hypertension  Assessment & Plan:  Continue amlodipine 10 mg daily, losartan 25 mg daily, Aldactone 12 5 mg daily and furosemide 20 mg daily  Get lab work as recommended by cardiology and by myself  Recheck in the office in 3 months  2  Mixed hyperlipidemia  Assessment & Plan:  Overdue for lab, continue atorvastatin 40 mg daily  3  Hemorrhagic stroke ([facility] [address] )    4  Left sided lacunar infarction ([facility] [address] )    5  Prediabetes  Assessment & Plan:  Overdue for lab      6  Chronic diastolic heart failure with preserved ejection fraction [facility])  Assessment & Plan:  Wt Readings from Last 3 Encounters:   23 93 4 kg (206 lb)   23 95 kg (209 lb 6 4 oz)   23 96 2 kg (212 lb)       Continue losartan 25 mg daily, Aldactone 12 5 mg daily, furosemide 20 mg daily  Follow-up with cardiology as scheduled  7  Obstructive sleep apnea treated with continuous positive airway pressure (CPAP)  Assessment & Plan:  Continue CPAP           Subjective      Presents to the office for follow-up on his blood pressure and to review recent cardiology visit  Was felt to have chronic diastolic heart failure, diuretics were added  Swelling in his legs much improved  Balance is still not perfect, and alarmingly he tells me he goes up ladders to do some work at times  Chief Complaint   Patient presents with   • Follow-up   • Hypertension       Review of Systems   Constitutional: Negative for appetite change, chills, diaphoresis, fatigue, fever and unexpected weight change  HENT: Negative for congestion, ear pain, hearing loss, nosebleeds, postnasal drip, rhinorrhea, sinus pressure, sore throat, tinnitus and trouble swallowing      Eyes: Negative for photophobia, pain, discharge, redness, itching and visual disturbance  Respiratory: Negative for cough, chest tightness, shortness of breath and wheezing  Cardiovascular: Negative for chest pain, palpitations and leg swelling  Denies orthopnea , dyspnea on exertion   Gastrointestinal: Negative for abdominal distention, abdominal pain, blood in stool, constipation, diarrhea, nausea and vomiting  Endocrine: Negative  Genitourinary: Negative for difficulty urinating, dysuria, flank pain, frequency, hematuria and urgency  Denies nocturia , erectile dysfunction   Musculoskeletal: Negative for arthralgias, back pain, gait problem, joint swelling and myalgias  Skin: Negative for pallor, rash and wound  Denies skin lesions   Allergic/Immunologic: Negative for environmental allergies, food allergies and immunocompromised state  Neurological: Negative for dizziness, tremors, seizures, syncope, speech difficulty, weakness, numbness and headaches  Balance problems  Worse when he closes eyes  Hematological: Negative for adenopathy  Does not bruise/bleed easily  Psychiatric/Behavioral: Negative for behavioral problems, confusion, sleep disturbance and suicidal ideas  The patient is not nervous/anxious  Current Outpatient Medications on File Prior to Visit   Medication Sig   • acetaminophen (TYLENOL) 325 mg tablet Take 650 mg by mouth every 6 (six) hours as needed for mild pain As needed   • amLODIPine (NORVASC) 10 mg tablet TAKE 1 TABLET BY MOUTH EVERY DAY   • atorvastatin (LIPITOR) 40 mg tablet TAKE 1 TABLET BY MOUTH EVERY DAY WITH DINNER   • clopidogrel (PLAVIX) 75 mg tablet TAKE 1 TABLET BY MOUTH EVERY DAY   • fluticasone (FLONASE) 50 mcg/act nasal spray 1 spray into each nostril daily   • furosemide (LASIX) 20 mg tablet Take 1 tablet (20 mg total) by mouth every other day Take it in the morning  • losartan (COZAAR) 25 mg tablet TAKE 1 TABLET (25 MG TOTAL) BY MOUTH DAILY     • spironolactone (ALDACTONE) 25 mg tablet Take 0 5 tablets (12 5 mg total) by mouth daily       Objective     /74   Ht 5' 10" (1 778 m)   Wt 93 4 kg (206 lb)   BMI 29 56 kg/m²     Physical Exam  Constitutional:       Appearance: He is well-developed  HENT:      Head: Normocephalic and atraumatic  Right Ear: Tympanic membrane and ear canal normal       Left Ear: Tympanic membrane and ear canal normal       Nose: Nose normal       Mouth/Throat:      Mouth: Mucous membranes are moist    Eyes:      Conjunctiva/sclera: Conjunctivae normal       Pupils: Pupils are equal, round, and reactive to light  Neck:      Thyroid: No thyromegaly  Vascular: No JVD  Trachea: No tracheal deviation  Cardiovascular:      Rate and Rhythm: Normal rate and regular rhythm  Heart sounds: No murmur heard  Pulmonary:      Effort: Pulmonary effort is normal       Breath sounds: Normal breath sounds  No wheezing or rales  Abdominal:      General: Bowel sounds are normal       Palpations: Abdomen is soft  There is no mass  Tenderness: There is no abdominal tenderness  There is no guarding or rebound  Musculoskeletal:         General: No tenderness or deformity  Cervical back: Normal range of motion and neck supple  Lymphadenopathy:      Cervical: No cervical adenopathy  Skin:     General: Skin is warm and dry  Capillary Refill: Capillary refill takes less than 2 seconds  Findings: No lesion or rash  Nails: There is no clubbing  Neurological:      General: No focal deficit present  Mental Status: He is alert and oriented to person, place, and time  Cranial Nerves: No cranial nerve deficit or facial asymmetry  Motor: No abnormal muscle tone  Coordination: Romberg sign positive  Coordination normal       Deep Tendon Reflexes: Reflexes are normal and symmetric   Reflexes normal    Psychiatric:         Mood and Affect: Mood normal          Judgment: Judgment normal        Armin Mueller DO

## 2023-02-28 NOTE — ASSESSMENT & PLAN NOTE
Wt Readings from Last 3 Encounters:   02/28/23 93 4 kg (206 lb)   02/22/23 95 kg (209 lb 6 4 oz)   02/17/23 96 2 kg (212 lb)       Continue losartan 25 mg daily, Aldactone 12 5 mg daily, furosemide 20 mg daily  Follow-up with cardiology as scheduled

## 2023-02-28 NOTE — ASSESSMENT & PLAN NOTE
Continue amlodipine 10 mg daily, losartan 25 mg daily, Aldactone 12 5 mg daily and furosemide 20 mg daily  Get lab work as recommended by cardiology and by myself  Recheck in the office in 3 months

## 2023-03-20 ENCOUNTER — OFFICE VISIT (OUTPATIENT)
Dept: FAMILY MEDICINE CLINIC | Facility: CLINIC | Age: 78
End: 2023-03-20

## 2023-03-20 VITALS
TEMPERATURE: 97.3 F | OXYGEN SATURATION: 99 % | DIASTOLIC BLOOD PRESSURE: 60 MMHG | HEIGHT: 70 IN | SYSTOLIC BLOOD PRESSURE: 132 MMHG | WEIGHT: 207.6 LBS | BODY MASS INDEX: 29.72 KG/M2

## 2023-03-20 DIAGNOSIS — J40 BRONCHITIS: Primary | ICD-10-CM

## 2023-03-20 DIAGNOSIS — F32.5 MAJOR DEPRESSIVE DISORDER WITH SINGLE EPISODE, IN FULL REMISSION (HCC): ICD-10-CM

## 2023-03-20 RX ORDER — DOXYCYCLINE HYCLATE 100 MG/1
100 CAPSULE ORAL EVERY 12 HOURS SCHEDULED
Qty: 20 CAPSULE | Refills: 0 | Status: SHIPPED | OUTPATIENT
Start: 2023-03-20 | End: 2023-03-30

## 2023-03-20 RX ORDER — BENZONATATE 200 MG/1
200 CAPSULE ORAL 3 TIMES DAILY PRN
Qty: 20 CAPSULE | Refills: 0 | Status: SHIPPED | OUTPATIENT
Start: 2023-03-20

## 2023-03-20 NOTE — PROGRESS NOTES
Chief Complaint   Patient presents with   • Cough      X 1 wk  Name: Rebecca Islas      : 1945      MRN: [id number]  Encounter Provider: Mily Hernandez DO  Encounter Date: 3/20/2023   Encounter department: [facility] PRIMARY CARE    Assessment & Plan     1  Bronchitis  Comments:  Take the medications as directed  Call if symptoms do not improve  Orders:  -     benzonatate (TESSALON) 200 MG capsule; Take 1 capsule (200 mg total) by mouth 3 (three) times a day as needed for cough  -     doxycycline hyclate (VIBRAMYCIN) 100 mg capsule; Take 1 capsule (100 mg total) by mouth every 12 (twelve) hours for 10 days    2  Major depressive disorder with single episode, in full remission ([facility] )  Comments:  Briefly discussed, patient doing well off of medications  Subjective      Presents to the office complaining of a productive cough for approximately 1 week  It has stagnated, neither getting worse nor improving  No ill contacts to his knowledge  Does feel bronchial in nature, catches his breath occasionally  Denies any fever, chills, sinus congestion, runny nose  Review of Systems   Constitutional: Negative for chills, fatigue and fever  HENT: Negative for ear pain and sore throat  Eyes: Negative for pain and visual disturbance  Respiratory: Positive for cough and shortness of breath  Negative for chest tightness, wheezing and stridor  Cardiovascular: Negative for chest pain and palpitations  Gastrointestinal: Negative for abdominal pain and vomiting  Genitourinary: Negative for dysuria and hematuria  Musculoskeletal: Negative for arthralgias, back pain, gait problem and joint swelling  Skin: Negative for color change and rash  Neurological: Negative for seizures and syncope  All other systems reviewed and are negative        Current Outpatient Medications on File Prior to Visit   Medication Sig   • acetaminophen (TYLENOL) 325 mg tablet Take 650 mg by mouth every 6 (six) hours as needed for mild pain As needed   • amLODIPine (NORVASC) 10 mg tablet TAKE 1 TABLET BY MOUTH EVERY DAY   • atorvastatin (LIPITOR) 40 mg tablet TAKE 1 TABLET BY MOUTH EVERY DAY WITH DINNER   • clopidogrel (PLAVIX) 75 mg tablet TAKE 1 TABLET BY MOUTH EVERY DAY   • fluticasone (FLONASE) 50 mcg/act nasal spray 1 spray into each nostril daily   • furosemide (LASIX) 20 mg tablet Take 1 tablet (20 mg total) by mouth every other day Take it in the morning  • losartan (COZAAR) 25 mg tablet TAKE 1 TABLET (25 MG TOTAL) BY MOUTH DAILY  • spironolactone (ALDACTONE) 25 mg tablet Take 0 5 tablets (12 5 mg total) by mouth daily       Objective     /60   Temp (!) 97 3 °F (36 3 °C)   Ht 5' 10" (1 778 m)   Wt 94 2 kg (207 lb 9 6 oz)   SpO2 99%   BMI 29 79 kg/m²     Physical Exam  Constitutional:       Appearance: He is well-developed  HENT:      Head: Normocephalic and atraumatic  Right Ear: Tympanic membrane and ear canal normal       Left Ear: Tympanic membrane and ear canal normal       Nose: Nose normal       Mouth/Throat:      Mouth: Mucous membranes are moist    Eyes:      Conjunctiva/sclera: Conjunctivae normal       Pupils: Pupils are equal, round, and reactive to light  Neck:      Thyroid: No thyromegaly  Vascular: No JVD  Trachea: No tracheal deviation  Cardiovascular:      Rate and Rhythm: Normal rate and regular rhythm  Heart sounds: No murmur heard  Pulmonary:      Effort: Pulmonary effort is normal       Breath sounds: Normal breath sounds  No wheezing or rales  Abdominal:      General: Bowel sounds are normal       Palpations: Abdomen is soft  There is no mass  Tenderness: There is no abdominal tenderness  There is no guarding or rebound  Musculoskeletal:         General: No tenderness or deformity  Cervical back: Normal range of motion and neck supple  Lymphadenopathy:      Cervical: No cervical adenopathy     Skin:     General: Skin is warm and dry  Capillary Refill: Capillary refill takes less than 2 seconds  Findings: No lesion or rash  Nails: There is no clubbing  Neurological:      General: No focal deficit present  Mental Status: He is alert and oriented to person, place, and time  Cranial Nerves: No cranial nerve deficit  Motor: No abnormal muscle tone  Coordination: Coordination normal       Deep Tendon Reflexes: Reflexes are normal and symmetric   Reflexes normal    Psychiatric:         Mood and Affect: Mood normal          Judgment: Judgment normal        Amelia Waldrop DO

## 2023-03-21 ENCOUNTER — APPOINTMENT (OUTPATIENT)
Dept: LAB | Facility: HOSPITAL | Age: 78
End: 2023-03-21

## 2023-03-21 DIAGNOSIS — I10 PRIMARY HYPERTENSION: ICD-10-CM

## 2023-03-21 DIAGNOSIS — E78.2 MIXED HYPERLIPIDEMIA: ICD-10-CM

## 2023-03-21 DIAGNOSIS — I69.30 SEQUELA OF LACUNAR INFARCTION: ICD-10-CM

## 2023-03-21 DIAGNOSIS — R73.03 PREDIABETES: ICD-10-CM

## 2023-03-21 DIAGNOSIS — E55.9 VITAMIN D DEFICIENCY: ICD-10-CM

## 2023-03-21 DIAGNOSIS — I50.32 CHRONIC DIASTOLIC HEART FAILURE WITH PRESERVED EJECTION FRACTION (HCC): ICD-10-CM

## 2023-03-21 LAB
25(OH)D3 SERPL-MCNC: 12.9 NG/ML (ref 30–100)
ALBUMIN SERPL BCP-MCNC: 4.5 G/DL (ref 3.5–5)
ALP SERPL-CCNC: 81 U/L (ref 34–104)
ALT SERPL W P-5'-P-CCNC: 21 U/L (ref 7–52)
ANION GAP SERPL CALCULATED.3IONS-SCNC: 8 MMOL/L (ref 4–13)
AST SERPL W P-5'-P-CCNC: 17 U/L (ref 13–39)
BASOPHILS # BLD AUTO: 0.01 THOUSANDS/ÂΜL (ref 0–0.1)
BASOPHILS NFR BLD AUTO: 0 % (ref 0–1)
BILIRUB SERPL-MCNC: 0.91 MG/DL (ref 0.2–1)
BUN SERPL-MCNC: 14 MG/DL (ref 5–25)
CALCIUM SERPL-MCNC: 9.4 MG/DL (ref 8.4–10.2)
CHLORIDE SERPL-SCNC: 106 MMOL/L (ref 96–108)
CHOLEST SERPL-MCNC: 127 MG/DL
CO2 SERPL-SCNC: 27 MMOL/L (ref 21–32)
CREAT SERPL-MCNC: 0.8 MG/DL (ref 0.6–1.3)
EOSINOPHIL # BLD AUTO: 0.1 THOUSAND/ÂΜL (ref 0–0.61)
EOSINOPHIL NFR BLD AUTO: 2 % (ref 0–6)
ERYTHROCYTE [DISTWIDTH] IN BLOOD BY AUTOMATED COUNT: 12.9 % (ref 11.6–15.1)
GFR SERPL CREATININE-BSD FRML MDRD: 84 ML/MIN/1.73SQ M
GLUCOSE P FAST SERPL-MCNC: 97 MG/DL (ref 65–99)
HCT VFR BLD AUTO: 46.5 % (ref 36.5–49.3)
HDLC SERPL-MCNC: 46 MG/DL
HGB BLD-MCNC: 14.8 G/DL (ref 12–17)
IMM GRANULOCYTES # BLD AUTO: 0.01 THOUSAND/UL (ref 0–0.2)
IMM GRANULOCYTES NFR BLD AUTO: 0 % (ref 0–2)
LDLC SERPL CALC-MCNC: 62 MG/DL (ref 0–100)
LYMPHOCYTES # BLD AUTO: 2.18 THOUSANDS/ÂΜL (ref 0.6–4.47)
LYMPHOCYTES NFR BLD AUTO: 34 % (ref 14–44)
MCH RBC QN AUTO: 29.3 PG (ref 26.8–34.3)
MCHC RBC AUTO-ENTMCNC: 31.8 G/DL (ref 31.4–37.4)
MCV RBC AUTO: 92 FL (ref 82–98)
MONOCYTES # BLD AUTO: 0.52 THOUSAND/ÂΜL (ref 0.17–1.22)
MONOCYTES NFR BLD AUTO: 8 % (ref 4–12)
NEUTROPHILS # BLD AUTO: 3.67 THOUSANDS/ÂΜL (ref 1.85–7.62)
NEUTS SEG NFR BLD AUTO: 56 % (ref 43–75)
NONHDLC SERPL-MCNC: 81 MG/DL
NRBC BLD AUTO-RTO: 0 /100 WBCS
NT-PROBNP SERPL-MCNC: 362 PG/ML
PLATELET # BLD AUTO: 182 THOUSANDS/UL (ref 149–390)
PMV BLD AUTO: 10.2 FL (ref 8.9–12.7)
POTASSIUM SERPL-SCNC: 4.3 MMOL/L (ref 3.5–5.3)
PROT SERPL-MCNC: 6.6 G/DL (ref 6.4–8.4)
RBC # BLD AUTO: 5.05 MILLION/UL (ref 3.88–5.62)
SODIUM SERPL-SCNC: 141 MMOL/L (ref 135–147)
T4 SERPL-MCNC: 8.8 UG/DL (ref 4.7–13.3)
TRIGL SERPL-MCNC: 94 MG/DL
TSH SERPL DL<=0.05 MIU/L-ACNC: 1.92 UIU/ML (ref 0.45–4.5)
WBC # BLD AUTO: 6.49 THOUSAND/UL (ref 4.31–10.16)

## 2023-03-22 LAB
EST. AVERAGE GLUCOSE BLD GHB EST-MCNC: 123 MG/DL
HBA1C MFR BLD: 5.9 %

## 2023-04-05 ENCOUNTER — OFFICE VISIT (OUTPATIENT)
Dept: CARDIOLOGY CLINIC | Facility: CLINIC | Age: 78
End: 2023-04-05

## 2023-04-05 VITALS
HEIGHT: 70 IN | SYSTOLIC BLOOD PRESSURE: 141 MMHG | HEART RATE: 64 BPM | DIASTOLIC BLOOD PRESSURE: 65 MMHG | WEIGHT: 207.8 LBS | BODY MASS INDEX: 29.75 KG/M2

## 2023-04-05 DIAGNOSIS — I50.32 CHRONIC DIASTOLIC HEART FAILURE WITH PRESERVED EJECTION FRACTION (HCC): Primary | ICD-10-CM

## 2023-04-05 DIAGNOSIS — G47.33 OBSTRUCTIVE SLEEP APNEA TREATED WITH CONTINUOUS POSITIVE AIRWAY PRESSURE (CPAP): ICD-10-CM

## 2023-04-05 DIAGNOSIS — I11.0 HYPERTENSIVE HEART DISEASE WITH CHRONIC DIASTOLIC CONGESTIVE HEART FAILURE (HCC): ICD-10-CM

## 2023-04-05 DIAGNOSIS — I50.32 HYPERTENSIVE HEART DISEASE WITH CHRONIC DIASTOLIC CONGESTIVE HEART FAILURE (HCC): ICD-10-CM

## 2023-04-05 DIAGNOSIS — I10 PRIMARY HYPERTENSION: ICD-10-CM

## 2023-04-05 DIAGNOSIS — I65.22 CAROTID STENOSIS, LEFT: ICD-10-CM

## 2023-04-05 DIAGNOSIS — Z99.89 OBSTRUCTIVE SLEEP APNEA TREATED WITH CONTINUOUS POSITIVE AIRWAY PRESSURE (CPAP): ICD-10-CM

## 2023-04-05 NOTE — PROGRESS NOTES
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, October 2021  5  Bilateral carotid artery disease  6  Sensorineural hearing loss bilaterally  7  Chronic rhinitis  8  Recently diagnosed obstructive sleep apnea  9  Actinic keratosis  10  Diastolic dysfunction without heart failure  11  History of recurrent depression  12  Right rotator cuff injury following fall from bed  13  History of prostate CA, status post radical prostatectomy about 23 years back    48-HOUR HOLTER November 2022: Average heart rate was 66 bpm with minimum heart rate of 44 and maximum of 111  There was 22 hours 30 minutes of bradycardia  There was 5 minutes 42 seconds of tachycardia  Predominant rhythm was normal sinus  There was 1 6% supraventricular ectopy burden  There were 6 supraventricular runs with longest run of 5 beats and fastest being 150 bpm   There were 106 atrial pairs  Longest R-R interval was 1 8 seconds  There was no significant ventricular ectopy  ECHOCARDIOGRAM 2/17/2023: Mild concentric left ventricle hypertrophy, normal left ventricular systolic function, EF 02%, grade 2 diastolic dysfunction, normal right ventricle size and systolic function, mild left atrial cavity enlargement, trace aortic valve regurgitation, trace mitral valve and tricuspid valve regurgitation, trace pulmonic valve regurgitation, no pericardial effusion  No pulmonary hypertension         CURRENT ECG   No results found for this visit on 04/05/23  CARDIOLOGY ASSESSMENT & PLAN     1  Chronic diastolic heart failure with preserved ejection fraction ([facility] )        2  Hypertensive heart disease with chronic diastolic congestive heart failure ([facility] )        3  Primary hypertension        4  Obstructive sleep apnea treated with continuous positive airway pressure (CPAP)        5  Carotid stenosis, left          Hypertensive heart disease with chronic diastolic congestive heart failure ([facility] )  Wt Readings from Last 3 Encounters:   04/05/23 94 3 kg (207 lb 12 8 oz)   03/20/23 94 2 kg (207 lb 9 6 oz)   02/28/23 93 4 kg (206 lb)     Mr Kellie Mora is overall doing well with improvement in his lower extremity edema  And no significant shortness of breath or dizziness or lightheadedness  Though blood pressure in office is elevated he reports normal blood pressure at home  He is on good medical regimen  Recent blood work still indicates stable renal function and electrolytes  -- I am advising him to continue current medications  -- I am advising him to intermittently measure blood pressures at home and keep a record  When he has about 10-15 readings he can bring it to us for review  -- Will add nonpharmacologic measures for blood pressure control  Some tips are provided below  -- Dietary and medical compliance are reinforced  -- He is advised  to report any concerning symptoms such as chest pain, shortness of breath, decline in exercise tolerance or presyncope/syncope  INTERVAL HISTORY & HISTORY OF PRESENT ILLNESS     Kellie Mora is here for follow-up regarding his cardiac comorbidities which include: Premature atrial contractions with symptoms, primary hypertension, dyslipidemia, diastolic dysfunction without heart failure previously and other comorbidities  He was recently seen by me in February 2023    At last visit his medications were modified with addition of diuretic and aldosterone antagonist and he was advised to  have blood work prior to next follow-up  Today he mentions that he has been overall well  He has had improvement in swelling in his lower extremities  He denies chest pain or shortness of breath or dizziness or palpitations he remains active     Denies orthopnea PND or presyncope/syncope  He uses his CPAP machine regularly  Functional capacity status: Moderate   (Excellent- >10 METs; Good: (7-10 METs); Moderate (4-7 METs); Poor (<= 4 METs)    Any chronic stressors: None   (feeling of poor health, financial problems, and social isolation etc)  Tobacco or alcohol dependence: He has never been a smoker  He drinks very rarely  Current cardiac medications: Atorvastatin 40 mg daily, amlodipine 10 mg daily, losartan 25 mg daily clopidogrel 75 mg daily, furosemide 20 mg every other day spironolactone 12 5 mg daily  Blood work from 3/21/2023 shows sodium 141 potassium 4 3 chloride 106 bicarb 27 BUN 14 creatinine 0 8 normal LFTs NT proBNP level 362 total cholesterol 127 triglyceride 94 HDL 46 calculated LDL 62 vitamin D level 12 9 normal CBC hemoglobin A1c 5 9 TSH 1 915 Free T48 8    REVIEW OF SYSTEMS   Positive for: Noted above in HPI  Negative for: All remaining as reviewed below and in HPI      SYSTEM SYMPTOMS REVIEWED:  General--weight change, fever, night sweats  Respiratory--cough, wheezing, shortness of breath, sputum production  Cardiovascular--chest pain, syncope, dyspnea on exertion, edema, decline in exercise tolerance, claudication   Gastrointestinal--persistent vomiting, diarrhea, abdominal distention, blood in stool   Muscular or skeletal--joint pain or swelling   Neurologic--headaches, syncope, abnormal movement  Hematologic--history of easy bruising and bleeding   Endocrine--thyroid enlargement, heat or cold intolerance, polyuria   Psychiatric--anxiety, depression "    *-*-*-*-*-*-*-*-*-*-*-*-*-*-*-*-*-*-*-*-*-*-*-*-*-*-*-*-*-*-*-*-*-*-*-*-*-*-*-*-*-*-*-*-*-*-*-*-*-*-*-*-*-*-  VITAL SIGNS     CURRENT VITAL SIGNS:   Vitals:    04/05/23 1526 04/05/23 1541   BP: 150/54 141/65   BP Location: Left arm Left arm   Patient Position: Sitting Sitting   Cuff Size: Large Large   Pulse: 64    Weight: 94 3 kg (207 lb 12 8 oz)    Height: 5' 10\" (1 778 m)        BMI: Body mass index is 29 82 kg/m²  WEIGHTS:   Wt Readings from Last 25 Encounters:   04/05/23 94 3 kg (207 lb 12 8 oz)   03/20/23 94 2 kg (207 lb 9 6 oz)   02/28/23 93 4 kg (206 lb)   02/22/23 95 kg (209 lb 6 4 oz)   02/17/23 96 2 kg (212 lb)   01/27/23 96 2 kg (212 lb)   01/20/23 96 3 kg (212 lb 3 2 oz)   12/06/22 92 5 kg (204 lb)   11/09/22 90 7 kg (200 lb)   10/31/22 89 8 kg (198 lb)   10/11/22 90 6 kg (199 lb 11 2 oz)   09/14/22 92 1 kg (203 lb)   07/29/22 92 5 kg (204 lb)   06/02/22 92 7 kg (204 lb 6 4 oz)   04/29/22 90 3 kg (199 lb)   04/08/22 90 3 kg (199 lb)   03/23/22 88 kg (194 lb)   01/27/22 86 2 kg (190 lb)   12/16/21 84 4 kg (186 lb)   11/29/21 84 4 kg (186 lb)   11/04/21 84 4 kg (186 lb)   11/02/21 85 3 kg (188 lb)   10/25/21 85 kg (187 lb 8 oz)   10/08/21 91 4 kg (201 lb 8 oz)   10/02/21 94 kg (207 lb 3 7 oz)        *-*-*-*-*-*-*-*-*-*-*-*-*-*-*-*-*-*-*-*-*-*-*-*-*-*-*-*-*-*-*-*-*-*-*-*-*-*-*-*-*-*-*-*-*-*-*-*-*-*-*-*-*-*-  PHYSICAL EXAM     General Appearance:    Alert, cooperative, no distress, appears stated age   Head, Eyes, ENT:    No obvious abnormality, moist mucous mebranes  Neck:   Supple, no carotid bruit or JVD   Back:     Symmetric, no curvature  Lungs:     Respirations unlabored  Clear to auscultation bilaterally,    Chest wall:    No tenderness or deformity   Heart:    Regular rate and rhythm, S1 and S2 normal, no murmur, rub  or gallop  Abdomen:     Soft, non-tender,    Extremities:   Extremities warm, no cyanosis, grade 1+ pedal edema slightly more on the right compared to left     Skin:   Male " venostatic changes in lower extremities  Normal skin color, texture, and turgor  No rashes or lesions     *-*-*-*-*-*-*-*-*-*-*-*-*-*-*-*-*-*-*-*-*-*-*-*-*-*-*-*-*-*-*-*-*-*-*-*-*-*-*-*-*-*-*-*-*-*-*-*-*-*-*-*-*-*-  CURRENT MEDICATIONS LIST     Current Outpatient Medications:   •  acetaminophen (TYLENOL) 325 mg tablet, Take 650 mg by mouth every 6 (six) hours as needed for mild pain As needed, Disp: , Rfl:   •  amLODIPine (NORVASC) 10 mg tablet, TAKE 1 TABLET BY MOUTH EVERY DAY, Disp: 90 tablet, Rfl: 1  •  atorvastatin (LIPITOR) 40 mg tablet, TAKE 1 TABLET BY MOUTH EVERY DAY WITH DINNER, Disp: 90 tablet, Rfl: 1  •  benzonatate (TESSALON) 200 MG capsule, Take 1 capsule (200 mg total) by mouth 3 (three) times a day as needed for cough, Disp: 20 capsule, Rfl: 0  •  clopidogrel (PLAVIX) 75 mg tablet, TAKE 1 TABLET BY MOUTH EVERY DAY, Disp: 90 tablet, Rfl: 1  •  fluticasone (FLONASE) 50 mcg/act nasal spray, 1 spray into each nostril daily, Disp: 16 g, Rfl: 5  •  furosemide (LASIX) 20 mg tablet, Take 1 tablet (20 mg total) by mouth every other day Take it in the morning , Disp: 45 tablet, Rfl: 3  •  losartan (COZAAR) 25 mg tablet, TAKE 1 TABLET (25 MG TOTAL) BY MOUTH DAILY  , Disp: 90 tablet, Rfl: 1  •  spironolactone (ALDACTONE) 25 mg tablet, Take 0 5 tablets (12 5 mg total) by mouth daily, Disp: 45 tablet, Rfl: 3       ALLERGIES     Allergies   Allergen Reactions   • Ace Inhibitors Cough       *-*-*-*-*-*-*-*-*-*-*-*-*-*-*-*-*-*-*-*-*-*-*-*-*-*-*-*-*-*-*-*-*-*-*-*-*-*-*-*-*-*-*-*-*-*-*-*-*-*-*-*-*-*-  LABORATORY DATA   No results found for: NA  Potassium   Date Value Ref Range Status   03/21/2023 4 3 3 5 - 5 3 mmol/L Final   07/28/2022 4 5 3 5 - 5 3 mmol/L Final   04/21/2022 4 2 3 5 - 5 3 mmol/L Final   07/06/2021 4 6 3 5 - 5 3 mmol/L Final     Chloride   Date Value Ref Range Status   03/21/2023 106 96 - 108 mmol/L Final   07/28/2022 109 (H) 96 - 108 mmol/L Final   04/21/2022 108 100 - 108 mmol/L Final   07/06/2021 104 98 - 110 mmol/L Final     CO2   Date Value Ref Range Status   03/21/2023 27 21 - 32 mmol/L Final   07/28/2022 27 21 - 32 mmol/L Final   04/21/2022 29 21 - 32 mmol/L Final   07/06/2021 26 20 - 32 mmol/L Final     BUN   Date Value Ref Range Status   03/21/2023 14 5 - 25 mg/dL Final   07/28/2022 15 5 - 25 mg/dL Final   04/21/2022 17 5 - 25 mg/dL Final   07/06/2021 13 7 - 25 mg/dL Final     Creatinine   Date Value Ref Range Status   03/21/2023 0 80 0 60 - 1 30 mg/dL Final     Comment:     Standardized to IDMS reference method   07/28/2022 0 96 0 60 - 1 30 mg/dL Final     Comment:     Standardized to IDMS reference method   04/21/2022 1 01 0 60 - 1 30 mg/dL Final     Comment:     Standardized to IDMS reference method     eGFR   Date Value Ref Range Status   03/21/2023 84 ml/min/1 73sq m Final   07/28/2022 75 ml/min/1 73sq m Final   04/21/2022 70 ml/min/1 73sq m Final     Calcium   Date Value Ref Range Status   03/21/2023 9 4 8 4 - 10 2 mg/dL Final   07/28/2022 9 6 8 3 - 10 1 mg/dL Final   04/21/2022 9 4 8 3 - 10 1 mg/dL Final   07/06/2021 9 4 8 6 - 10 3 mg/dL Final     AST   Date Value Ref Range Status   03/21/2023 17 13 - 39 U/L Final     Comment:     Specimen collection should occur prior to Sulfasalazine administration due to the potential for falsely depressed results  07/28/2022 19 5 - 45 U/L Final     Comment:     Specimen collection should occur prior to Sulfasalazine administration due to the potential for falsely depressed results  04/21/2022 19 5 - 45 U/L Final     Comment:     Specimen collection should occur prior to Sulfasalazine administration due to the potential for falsely depressed results  07/06/2021 22 10 - 35 U/L Final     ALT   Date Value Ref Range Status   03/21/2023 21 7 - 52 U/L Final     Comment:     Specimen collection should occur prior to Sulfasalazine administration due to the potential for falsely depressed results      07/28/2022 45 12 - 78 U/L Final     Comment:     Specimen collection should occur prior to Sulfasalazine and/or Sulfapyridine administration due to the potential for falsely depressed results  04/21/2022 38 12 - 78 U/L Final     Comment:     Specimen collection should occur prior to Sulfasalazine and/or Sulfapyridine administration due to the potential for falsely depressed results  07/06/2021 35 9 - 46 U/L Final     Alkaline Phosphatase   Date Value Ref Range Status   03/21/2023 81 34 - 104 U/L Final   07/28/2022 93 46 - 116 U/L Final   04/21/2022 99 46 - 116 U/L Final   07/06/2021 82 35 - 144 U/L Final     Magnesium   Date Value Ref Range Status   10/06/2021 2 5 1 6 - 2 6 mg/dL Final     Comment:     Slightly Hemolyzed; Results May be Affected&XA&Slightly Hemolyzed; Results May be Affected&XA&Slightly Hemolyzed; Results May be Affected&XA&Slightly Hemolyzed; Results May be Affected&XA&Slightly Hemolyzed; Results May be Affected   10/05/2021 2 5 1 6 - 2 6 mg/dL Final     Comment:     Slightly Hemolyzed;  Results May be Affected   10/04/2021 2 3 1 6 - 2 6 mg/dL Final     WBC   Date Value Ref Range Status   03/21/2023 6 49 4 31 - 10 16 Thousand/uL Final   04/21/2022 6 16 4 31 - 10 16 Thousand/uL Final   10/05/2021 6 92 4 31 - 10 16 Thousand/uL Final     Hemoglobin   Date Value Ref Range Status   03/21/2023 14 8 12 0 - 17 0 g/dL Final   04/21/2022 15 1 12 0 - 17 0 g/dL Final   10/05/2021 15 6 12 0 - 17 0 g/dL Final     Platelets   Date Value Ref Range Status   03/21/2023 182 149 - 390 Thousands/uL Final   04/21/2022 168 149 - 390 Thousands/uL Final   10/05/2021 159 149 - 390 Thousands/uL Final     PTT   Date Value Ref Range Status   10/02/2021 34 23 - 37 seconds Final     Comment:     Therapeutic Heparin Range =  60-90 seconds   12/30/2020 35 23 - 37 seconds Final     Comment:     Therapeutic Heparin Range =  60-90 seconds     INR   Date Value Ref Range Status   10/02/2021 1 03 0 84 - 1 19 Final   12/30/2020 1 07 0 84 - 1 19 Final     No results found for: CKMB, DIGOXIN  TSH   Date Value Ref Range Status   2021 1 61 0 40 - 4 50 mIU/L Final     No results found for: CHOL   Hemoglobin A1C   Date Value Ref Range Status   2023 5 9 (H) Normal 3 8-5 6%; PreDiabetic 5 7-6 4%; Diabetic >=6 5%; Glycemic control for adults with diabetes <7 0% % Final     No results found for: Lita Haver, GRAMSTAIN, URINECX, WOUNDCULT, BODYFLUIDCUL, MRSACULTURE, INFLUAPCR, INFLUBPCR, RSVPCR, LEGIONELLAUR, CDIFFTOXINB    *-*-*-*-*-*-*-*-*-*-*-*-*-*-*-*-*-*-*-*-*-*-*-*-*-*-*-*-*-*-*-*-*-*-*-*-*-*-*-*-*-*-*-*-*-*-*-*-*-*-*-*-*-*-  PREVIOUS CARDIOLOGY & RADIOLOGY TEST RESULTS   I have personally reviewed pertinent results of cardiovascular tests noted below  Results for orders placed during the hospital encounter of 10/02/21    Echo complete with contrast if indicated    Narrative  [facility] [address]  [facility], [address] [facility]  [phone]    Transthoracic Echocardiogram  2D, M-mode, Doppler, and Color Doppler    Study date:  03-Oct-2021    Patient: Eunice Gonsalves  MR number: [id number]  Account number: [de-identified]  : 1945  Age: 64 years  Gender: Male  Status: Inpatient  Location: Bedside  Height: 70 in  Weight: 205 9 lb  BP: 105/ 59 mmHg    Indications: Cerebral Infarction    Diagnoses: I63 9 - Cerebral infarction, unspecified    Sonographer:  Rafael Leavitt RDCS  Primary Physician:  Morales Florence DO  Referring Physician:  Gilford Journey, MD  Group:  [id number]  Interpreting Physician:  Efra Lindsay MD    SUMMARY    LEFT VENTRICLE:  Systolic function was hyperdynamic by visual assessment  Ejection fraction was estimated to be 75 %  There were no regional wall motion abnormalities  Features were consistent with a pseudonormal left ventricular filling pattern, with concomitant abnormal relaxation and increased filling pressure (grade 2 diastolic dysfunction)      LEFT ATRIUM:  The atrium was moderately dilated  RIGHT ATRIUM:  The atrium was mildly dilated  MITRAL VALVE:  There was trace regurgitation  AORTIC VALVE:  There was trace regurgitation  TRICUSPID VALVE:  There was trace regurgitation  IVC, HEPATIC VEINS:  The inferior vena cava was dilated  HISTORY: PRIOR HISTORY: Hypertension, Prostate Cancer, PVC's    PROCEDURE: The procedure was performed at the bedside  This was a routine study  The transthoracic approach was used  The study included complete 2D imaging, M-mode, complete spectral Doppler, and color Doppler  The heart rate was 69 bpm,  at the start of the study  Images were obtained from the parasternal, apical, subcostal, and suprasternal notch acoustic windows  Image quality was adequate  LEFT VENTRICLE: Size was normal  Systolic function was hyperdynamic by visual assessment  Ejection fraction was estimated to be 75 %  There were no regional wall motion abnormalities  Wall thickness was normal  DOPPLER: Features were  consistent with a pseudonormal left ventricular filling pattern, with concomitant abnormal relaxation and increased filling pressure (grade 2 diastolic dysfunction)  RIGHT VENTRICLE: The size was normal  Systolic function was normal  Wall thickness was normal     LEFT ATRIUM: The atrium was moderately dilated  RIGHT ATRIUM: The atrium was mildly dilated  MITRAL VALVE: Valve structure was normal  There was normal leaflet separation  DOPPLER: The transmitral velocity was within the normal range  There was no evidence for stenosis  There was trace regurgitation  AORTIC VALVE: The valve was trileaflet  Leaflets exhibited normal thickness and normal cuspal separation  DOPPLER: Transaortic velocity was within the normal range  There was no evidence for stenosis  There was trace regurgitation  TRICUSPID VALVE: The valve structure was normal  There was normal leaflet separation  DOPPLER: The transtricuspid velocity was within the normal range   There was no evidence for stenosis  There was trace regurgitation  Pulmonary artery  systolic pressure was at the upper limits of normal  Estimated peak PA pressure was 35 mmHg  PULMONIC VALVE: Leaflets exhibited normal thickness, no calcification, and normal cuspal separation  DOPPLER: The transpulmonic velocity was within the normal range  There was no significant regurgitation  PERICARDIUM: There was no pericardial effusion  The pericardium was normal in appearance  AORTA: The root exhibited normal size  SYSTEMIC VEINS: IVC: The inferior vena cava was dilated  Respirophasic changes were normal     MEASUREMENT TABLES    OTHER ECHO MEASUREMENTS  (Reference normals)  Estimated CVP   10 mmHg   (--)    SYSTEM MEASUREMENT TABLES    2D  %FS: 24 66 %  Ao Diam: 3 71 cm  Ao asc: 3 43 cm  EDV(Teich): 60 63 ml  EF(Teich): 49 66 %  ESV(Teich): 30 52 ml  IVSd: 1 7 cm  LA Diam: 4 59 cm  LAAs A4C: 28 15 cm2  LAESV A-L A4C: 106 1 ml  LAESV MOD A4C: 102 28 ml  LALs A4C: 6 34 cm  LVEDV MOD A4C: 53 74 ml  LVEF MOD A4C: 57 21 %  LVESV MOD A4C: 22 99 ml  LVIDd: 3 77 cm  LVIDs: 2 84 cm  LVLd A4C: 7 98 cm  LVLs A4C: 6 98 cm  LVPWd: 1 31 cm  RAESV A-L: 57 71 ml  RAESV MOD: 53 66 ml  RALs: 6 51 cm  RVIDd: 2 87 cm  SV MOD A4C: 30 74 ml  SV(Teich): 30 11 ml    CW  AV Env  Ti: 312 08 ms  AV VTI: 25 01 cm  AV Vmax: 1 27 m/s  AV Vmean: 0 8 m/s  AV maxP 49 mmHg  AV meanPG: 3 19 mmHg  TR Vmax: 2 47 m/s  TR maxP 45 mmHg    MM  TAPSE: 2 62 cm    PW  E' Av 09 m/s  E' Lat: 0 11 m/s  E' Sept: 0 07 m/s  E/E' Avg: 10 3  E/E' Lat: 8 5  E/E' Sept: 13 08  LVOT Env  Ti: 274 02 ms  LVOT VTI: 20 88 cm  LVOT Vmax: 1 15 m/s  LVOT Vmean: 0 76 m/s  LVOT maxP 25 mmHg  LVOT meanP 75 mmHg  MV A Kyle: 0 45 m/s  MV Dec Murray: 4 43 m/s2  MV DecT: 208 05 ms  MV E Kyle: 0 92 m/s  MV E/A Ratio: 2 06  MV PHT: 60 33 ms  MVA By PHT: 3 65 cm2    [address] [facility]    Prepared and electronically signed by    Jose Green Beatriz Rosales MD  Signed 03-Oct-2021 14:07:17    No results found for this or any previous visit  No results found for this or any previous visit  No results found for this or any previous visit  Echo complete w/ contrast if indicated  •  Left Ventricle: Left ventricular cavity size is normal  Wall thickness   is mildly increased  There is mild concentric hypertrophy  The left   ventricular ejection fraction is 70%  Systolic function is vigorous  Wall   motion is normal  Diastolic function is moderately abnormal, consistent   with grade II (pseudonormal) relaxation  There is no LV dynamic   obstruction  •  Right Ventricle: Right ventricular cavity size is normal  Systolic   function is normal   •  Left Atrium: The atrium is mildly dilated          *-*-*-*-*-*-*-*-*-*-*-*-*-*-*-*-*-*-*-*-*-*-*-*-*-*-*-*-*-*-*-*-*-*-*-*-*-*-*-*-*-*-*-*-*-*-*-*-*-*-*-*-*-*-  SIGNATURES:   @CAJ@   Janet Lara MD; MHA    *-*-*-*-*-*-*-*-*-*-*-*-*-*-*-*-*-*-*-*-*-*-*-*-*-*-*-*-*-*-*-*-*-*-*-*-*-*-*-*-*-*-*-*-*-*-*-*-*-*-*-*-*-*-  PAST MEDICAL HISTORY:  Past Medical History:   Diagnosis Date   • Cancer ([facility] )     prostate   • Carotid stenosis    • Hypertension    • Left sided lacunar infarction ([facility] ) 12/30/2020    PAST SURGICAL HISTORY:  Past Surgical History:   Procedure Laterality Date   • TONSILLECTOMY     • TRANSURETHRAL RESECTION OF PROSTATE           FAMILY HISTORY:  Family History   Problem Relation Age of Onset   • Colon cancer Mother    • No Known Problems Father    • Leukemia Maternal Grandmother    • Diabetes Maternal Grandmother    • Alcohol abuse Maternal Grandfather    • No Known Problems Paternal Grandmother    • No Known Problems Paternal Grandfather     SOCIAL HISTORY:  Social History     Tobacco Use   Smoking Status Former   Smokeless Tobacco Never      Social History     Substance and Sexual Activity   Alcohol Use Yes   • Alcohol/week: 4 0 standard drinks   • Types: 4 Cans of beer per week    Comment: several days a week     Social History     Substance and Sexual Activity   Drug Use Never    [unfilled]     *-*-*-*-*-*-*-*-*-*-*-*-*-*-*-*-*-*-*-*-*-*-*-*-*-*-*-*-*-*-*-*-*-*-*-*-*-*-*-*-*-*-*-*-*-*-*-*-*-*-*-*-*-*  ALLERGIES:  Allergies   Allergen Reactions   • Ace Inhibitors Cough    CURRENT SCHEDULED MEDICATIONS:    Current Outpatient Medications:   •  acetaminophen (TYLENOL) 325 mg tablet, Take 650 mg by mouth every 6 (six) hours as needed for mild pain As needed, Disp: , Rfl:   •  amLODIPine (NORVASC) 10 mg tablet, TAKE 1 TABLET BY MOUTH EVERY DAY, Disp: 90 tablet, Rfl: 1  •  atorvastatin (LIPITOR) 40 mg tablet, TAKE 1 TABLET BY MOUTH EVERY DAY WITH DINNER, Disp: 90 tablet, Rfl: 1  •  benzonatate (TESSALON) 200 MG capsule, Take 1 capsule (200 mg total) by mouth 3 (three) times a day as needed for cough, Disp: 20 capsule, Rfl: 0  •  clopidogrel (PLAVIX) 75 mg tablet, TAKE 1 TABLET BY MOUTH EVERY DAY, Disp: 90 tablet, Rfl: 1  •  fluticasone (FLONASE) 50 mcg/act nasal spray, 1 spray into each nostril daily, Disp: 16 g, Rfl: 5  •  furosemide (LASIX) 20 mg tablet, Take 1 tablet (20 mg total) by mouth every other day Take it in the morning , Disp: 45 tablet, Rfl: 3  •  losartan (COZAAR) 25 mg tablet, TAKE 1 TABLET (25 MG TOTAL) BY MOUTH DAILY  , Disp: 90 tablet, Rfl: 1  •  spironolactone (ALDACTONE) 25 mg tablet, Take 0 5 tablets (12 5 mg total) by mouth daily, Disp: 45 tablet, Rfl: 3     *-*-*-*-*-*-*-*-*-*-*-*-*-*-*-*-*-*-*-*-*-*-*-*-*-*-*-*-*-*-*-*-*-*-*-*-*-*-*-*-*-*-*-*-*-*-*-*-*-*-*-*-*-*

## 2023-04-05 NOTE — ASSESSMENT & PLAN NOTE
Wt Readings from Last 3 Encounters:   04/05/23 94 3 kg (207 lb 12 8 oz)   03/20/23 94 2 kg (207 lb 9 6 oz)   02/28/23 93 4 kg (206 lb)     Mr Genny Adame is overall doing well with improvement in his lower extremity edema  And no significant shortness of breath or dizziness or lightheadedness  Though blood pressure in office is elevated he reports normal blood pressure at home  He is on good medical regimen  Recent blood work still indicates stable renal function and electrolytes  -- I am advising him to continue current medications  -- I am advising him to intermittently measure blood pressures at home and keep a record  When he has about 10-15 readings he can bring it to us for review  -- Will add nonpharmacologic measures for blood pressure control  Some tips are provided below  -- Dietary and medical compliance are reinforced  -- He is advised  to report any concerning symptoms such as chest pain, shortness of breath, decline in exercise tolerance or presyncope/syncope

## 2023-04-05 NOTE — PATIENT INSTRUCTIONS
CARDIOLOGY ASSESSMENT & PLAN     1  Chronic diastolic heart failure with preserved ejection fraction ([facility] )        2  Hypertensive heart disease with chronic diastolic congestive heart failure ([facility] )        3  Primary hypertension        4  Obstructive sleep apnea treated with continuous positive airway pressure (CPAP)        5  Carotid stenosis, left          Hypertensive heart disease with chronic diastolic congestive heart failure ([facility] )  Wt Readings from Last 3 Encounters:   04/05/23 94 3 kg (207 lb 12 8 oz)   03/20/23 94 2 kg (207 lb 9 6 oz)   02/28/23 93 4 kg (206 lb)     Mr Alyssa Trujillo is overall doing well with improvement in his lower extremity edema  And no significant shortness of breath or dizziness or lightheadedness  Though blood pressure in office is elevated he reports normal blood pressure at home  He is on good medical regimen  Recent blood work still indicates stable renal function and electrolytes  -- I am advising him to continue current medications  -- I am advising him to intermittently measure blood pressures at home and keep a record  When he has about 10-15 readings he can bring it to us for review  -- Will add nonpharmacologic measures for blood pressure control  Some tips are provided below  -- Dietary and medical compliance are reinforced  -- He is advised  to report any concerning symptoms such as chest pain, shortness of breath, decline in exercise tolerance or presyncope/syncope

## 2023-04-06 DIAGNOSIS — I10 PRIMARY HYPERTENSION: ICD-10-CM

## 2023-04-06 RX ORDER — LOSARTAN POTASSIUM 25 MG/1
25 TABLET ORAL DAILY
Qty: 90 TABLET | Refills: 1 | Status: SHIPPED | OUTPATIENT
Start: 2023-04-06

## 2023-04-28 DIAGNOSIS — I61.9 HEMORRHAGIC STROKE (HCC): ICD-10-CM

## 2023-04-28 DIAGNOSIS — I10 HYPERTENSION: ICD-10-CM

## 2023-04-28 RX ORDER — AMLODIPINE BESYLATE 10 MG/1
TABLET ORAL
Qty: 90 TABLET | Refills: 1 | Status: SHIPPED | OUTPATIENT
Start: 2023-04-28

## 2023-05-04 ENCOUNTER — RA CDI HCC (OUTPATIENT)
Dept: OTHER | Facility: HOSPITAL | Age: 78
End: 2023-05-04

## 2023-05-04 DIAGNOSIS — I63.81 LEFT SIDED LACUNAR INFARCTION (HCC): ICD-10-CM

## 2023-05-04 DIAGNOSIS — I61.9 HEMORRHAGIC STROKE (HCC): ICD-10-CM

## 2023-05-04 DIAGNOSIS — E78.5 HYPERLIPIDEMIA: ICD-10-CM

## 2023-05-04 RX ORDER — ATORVASTATIN CALCIUM 40 MG/1
TABLET, FILM COATED ORAL
Qty: 90 TABLET | Refills: 1 | Status: SHIPPED | OUTPATIENT
Start: 2023-05-04

## 2023-05-04 NOTE — PROGRESS NOTES
[facility]  coding opportunities       Chart reviewed, no opportunity found: CHART REVIEWED, NO OPPORTUNITY FOUND        Patients Insurance     Medicare Insurance: [employer] [facility] Ad[employer]age

## 2023-05-09 ENCOUNTER — OFFICE VISIT (OUTPATIENT)
Dept: FAMILY MEDICINE CLINIC | Facility: CLINIC | Age: 78
End: 2023-05-09

## 2023-05-09 VITALS
DIASTOLIC BLOOD PRESSURE: 74 MMHG | HEART RATE: 70 BPM | SYSTOLIC BLOOD PRESSURE: 118 MMHG | BODY MASS INDEX: 31.4 KG/M2 | TEMPERATURE: 98 F | WEIGHT: 207.2 LBS | OXYGEN SATURATION: 98 % | HEIGHT: 68 IN

## 2023-05-09 DIAGNOSIS — R73.03 PREDIABETES: ICD-10-CM

## 2023-05-09 DIAGNOSIS — I69.30 SEQUELA OF LACUNAR INFARCTION: ICD-10-CM

## 2023-05-09 DIAGNOSIS — I50.32 CHRONIC DIASTOLIC HEART FAILURE WITH PRESERVED EJECTION FRACTION (HCC): ICD-10-CM

## 2023-05-09 DIAGNOSIS — E78.2 MIXED HYPERLIPIDEMIA: ICD-10-CM

## 2023-05-09 DIAGNOSIS — G47.33 OBSTRUCTIVE SLEEP APNEA SYNDROME: ICD-10-CM

## 2023-05-09 DIAGNOSIS — E55.9 VITAMIN D DEFICIENCY: ICD-10-CM

## 2023-05-09 DIAGNOSIS — I10 PRIMARY HYPERTENSION: Primary | ICD-10-CM

## 2023-05-09 NOTE — PROGRESS NOTES
Name: Vince Salmeron      : 1945      MRN: [id number]  Encounter Provider: Soco Maloney DO  Encounter Date: 2023   Encounter department: [facility] PRIMARY CARE    Assessment & Plan     1  Primary hypertension  Comments:  Continue amlodipine 10 mg daily, losartan 25 mg daily, Aldactone 25 mg daily furosemide 20 mg daily  Recheck 3 months  Orders:  -     Comprehensive metabolic panel; Future; Expected date: 2023  -     CBC and differential; Future; Expected date: 2023  -     Lipid panel; Future; Expected date: 2023  -     UA (URINE) with reflex to Scope; Future; Expected date: 2023    2  Chronic diastolic heart failure with preserved ejection fraction (HCC)  Comments:  Continue to monitor weight, call the office if he gains weight  Continue furosemide 20 mg daily, losartan 25 mg daily, Aldactone 25 mg daily  3  Obstructive sleep apnea syndrome    4  Mixed hyperlipidemia  Comments:  Last lab was stable, continue atorvastatin 40 mg daily  Recheck in 3 months  Orders:  -     Comprehensive metabolic panel; Future; Expected date: 2023  -     Lipid panel; Future; Expected date: 2023  -     TSH, 3rd generation; Future; Expected date: 2023  -     T4; Future; Expected date: 2023    5  Prediabetes  -     Comprehensive metabolic panel; Future; Expected date: 2023  -     Hemoglobin A1C; Future; Expected date: 2023  -     UA (URINE) with reflex to Scope; Future; Expected date: 2023    6  Vitamin D deficiency  -     Vitamin D 25 hydroxy; Future; Expected date: 2023    7  Sequela of lacunar infarction      BMI Counseling: Body mass index is 31 22 kg/m²  The BMI is above normal  Nutrition recommendations include decreasing portion sizes, encouraging healthy choices of fruits and vegetables, moderation in carbohydrate intake, increasing intake of lean protein and reducing intake of saturated and trans fat   Exercise recommendations include exercising 3-5 times per week  Rationale for BMI follow-up plan is due to patient being overweight or obese  Subjective     Chief Complaint   Patient presents with   • Follow-up      He is here for follow-up on his blood pressure, history of stroke, heart failure, weight, cholesterol, and a general checkup  Overall he feels pretty well  Blood pressures at home are running between 110//84  Review of Systems   Constitutional: Negative for appetite change, chills, diaphoresis, fatigue, fever and unexpected weight change  HENT: Negative for congestion, ear pain, hearing loss, nosebleeds, postnasal drip, rhinorrhea, sinus pressure, sore throat, tinnitus and trouble swallowing  Eyes: Negative for photophobia, pain, discharge, redness, itching and visual disturbance  Respiratory: Negative for cough, chest tightness, shortness of breath and wheezing  Cardiovascular: Negative for chest pain, palpitations and leg swelling  Denies orthopnea , dyspnea on exertion   Gastrointestinal: Negative for abdominal distention, abdominal pain, blood in stool, constipation, diarrhea, nausea and vomiting  Endocrine: Negative  Genitourinary: Negative for difficulty urinating, dysuria, flank pain, frequency, hematuria and urgency  Denies nocturia , erectile dysfunction   Musculoskeletal: Negative for arthralgias, back pain, gait problem, joint swelling and myalgias  Skin: Negative for pallor, rash and wound  Denies skin lesions   Allergic/Immunologic: Negative for environmental allergies, food allergies and immunocompromised state  Neurological: Negative for dizziness, tremors, seizures, syncope, speech difficulty, weakness, numbness and headaches  Hematological: Negative for adenopathy  Does not bruise/bleed easily  Psychiatric/Behavioral: Negative for behavioral problems, confusion, sleep disturbance and suicidal ideas  The patient is not nervous/anxious  "       Current Outpatient Medications on File Prior to Visit   Medication Sig   • acetaminophen (TYLENOL) 325 mg tablet Take 650 mg by mouth every 6 (six) hours as needed for mild pain As needed   • amLODIPine (NORVASC) 10 mg tablet TAKE 1 TABLET BY MOUTH EVERY DAY   • atorvastatin (LIPITOR) 40 mg tablet TAKE 1 TABLET BY MOUTH EVERY DAY WITH DINNER   • clopidogrel (PLAVIX) 75 mg tablet TAKE 1 TABLET BY MOUTH EVERY DAY   • furosemide (LASIX) 20 mg tablet Take 1 tablet (20 mg total) by mouth every other day Take it in the morning  • losartan (COZAAR) 25 mg tablet TAKE 1 TABLET (25 MG TOTAL) BY MOUTH DAILY  • spironolactone (ALDACTONE) 25 mg tablet Take 0 5 tablets (12 5 mg total) by mouth daily   • benzonatate (TESSALON) 200 MG capsule Take 1 capsule (200 mg total) by mouth 3 (three) times a day as needed for cough (Patient not taking: Reported on 5/9/2023)   • fluticasone (FLONASE) 50 mcg/act nasal spray 1 spray into each nostril daily (Patient not taking: Reported on 5/9/2023)       Objective     /74   Pulse 70   Temp 98 °F (36 7 °C) (Temporal)   Ht 5' 8 31\" (1 735 m)   Wt 94 kg (207 lb 3 2 oz)   SpO2 98%   BMI 31 22 kg/m²     Physical Exam  Constitutional:       Appearance: He is well-developed  HENT:      Head: Normocephalic and atraumatic  Right Ear: Tympanic membrane and ear canal normal       Left Ear: Tympanic membrane and ear canal normal       Nose: Nose normal    Eyes:      Conjunctiva/sclera: Conjunctivae normal       Pupils: Pupils are equal, round, and reactive to light  Neck:      Thyroid: No thyromegaly  Vascular: No JVD  Trachea: No tracheal deviation  Cardiovascular:      Rate and Rhythm: Normal rate and regular rhythm  Heart sounds: No murmur heard  Pulmonary:      Effort: Pulmonary effort is normal       Breath sounds: Normal breath sounds  No wheezing or rales  Abdominal:      General: Bowel sounds are normal       Palpations: Abdomen is soft   There is " no mass  Tenderness: There is no abdominal tenderness  There is no guarding or rebound  Musculoskeletal:         General: No tenderness or deformity  Cervical back: Normal range of motion and neck supple  Lymphadenopathy:      Cervical: No cervical adenopathy  Skin:     General: Skin is warm and dry  Capillary Refill: Capillary refill takes less than 2 seconds  Findings: No lesion or rash  Nails: There is no clubbing  Neurological:      General: No focal deficit present  Mental Status: He is alert and oriented to person, place, and time  Cranial Nerves: No cranial nerve deficit  Motor: No abnormal muscle tone  Coordination: Coordination normal       Deep Tendon Reflexes: Reflexes are normal and symmetric   Reflexes normal    Psychiatric:         Mood and Affect: Mood normal          Behavior: Behavior normal          Judgment: Judgment normal        Milo Plants, DO

## 2023-05-23 ENCOUNTER — TELEPHONE (OUTPATIENT)
Dept: FAMILY MEDICINE CLINIC | Facility: CLINIC | Age: 78
End: 2023-05-23

## 2023-05-23 NOTE — TELEPHONE ENCOUNTER
Patient's wife Davidson Gallegos called stating patient has a cough, she wants to know if he can take Mucinex DM cough medicine with his medications that he takes  Please advise Davidson Gallegos at [phone]

## 2023-05-25 ENCOUNTER — OFFICE VISIT (OUTPATIENT)
Dept: FAMILY MEDICINE CLINIC | Facility: CLINIC | Age: 78
End: 2023-05-25

## 2023-05-25 VITALS
TEMPERATURE: 97.8 F | DIASTOLIC BLOOD PRESSURE: 78 MMHG | WEIGHT: 207 LBS | BODY MASS INDEX: 31.37 KG/M2 | HEIGHT: 68 IN | SYSTOLIC BLOOD PRESSURE: 130 MMHG

## 2023-05-25 DIAGNOSIS — J31.0 CHRONIC RHINITIS: Primary | ICD-10-CM

## 2023-05-25 RX ORDER — MONTELUKAST SODIUM 10 MG/1
10 TABLET ORAL
Qty: 90 TABLET | Refills: 1 | Status: SHIPPED | OUTPATIENT
Start: 2023-05-25

## 2023-05-25 RX ORDER — BENZONATATE 200 MG/1
200 CAPSULE ORAL 3 TIMES DAILY PRN
Qty: 20 CAPSULE | Refills: 0 | Status: SHIPPED | OUTPATIENT
Start: 2023-05-25

## 2023-05-25 NOTE — PROGRESS NOTES
Chief Complaint   Patient presents with   • Cough   • Nasal Congestion     X  1 wk     Name: Usha Garsia      : 1945      MRN: [id number]  Encounter Provider: Gabi Wallace DO  Encounter Date: 2023   Encounter department: [facility] PRIMARY CARE    Assessment & Plan     1  Chronic rhinitis  Comments:  Take the medication as directed, call the office if symptoms do not improve  Orders:  -     montelukast (SINGULAIR) 10 mg tablet; Take 1 tablet (10 mg total) by mouth daily at bedtime  -     benzonatate (TESSALON) 200 MG capsule; Take 1 capsule (200 mg total) by mouth 3 (three) times a day as needed for cough           Subjective      Patient presents to the office complaining of nasal congestion, runny nose, postnasal drip, that has been present for months but has gotten somewhat worse over the last week along with a nonproductive cough  He denies any wheezing, chest pain, shortness of breath  They did a COVID test which was negative  Review of Systems   Constitutional: Negative for activity change, appetite change, chills, fatigue, fever and unexpected weight change  HENT: Positive for congestion, postnasal drip and rhinorrhea  Negative for ear pain, sinus pressure, sinus pain, sore throat and trouble swallowing  Eyes: Negative for pain and visual disturbance  Respiratory: Positive for cough  Negative for shortness of breath  Cardiovascular: Negative for chest pain and palpitations  Gastrointestinal: Negative for abdominal pain and vomiting  Genitourinary: Negative for dysuria and hematuria  Musculoskeletal: Negative for arthralgias and back pain  Skin: Negative for color change and rash  Neurological: Negative for seizures and syncope  All other systems reviewed and are negative        Current Outpatient Medications on File Prior to Visit   Medication Sig   • acetaminophen (TYLENOL) 325 mg tablet Take 650 mg by mouth every 6 (six) hours as needed for "mild pain As needed   • amLODIPine (NORVASC) 10 mg tablet TAKE 1 TABLET BY MOUTH EVERY DAY   • atorvastatin (LIPITOR) 40 mg tablet TAKE 1 TABLET BY MOUTH EVERY DAY WITH DINNER   • clopidogrel (PLAVIX) 75 mg tablet TAKE 1 TABLET BY MOUTH EVERY DAY   • furosemide (LASIX) 20 mg tablet Take 1 tablet (20 mg total) by mouth every other day Take it in the morning  • losartan (COZAAR) 25 mg tablet TAKE 1 TABLET (25 MG TOTAL) BY MOUTH DAILY  • spironolactone (ALDACTONE) 25 mg tablet Take 0 5 tablets (12 5 mg total) by mouth daily   • [DISCONTINUED] benzonatate (TESSALON) 200 MG capsule Take 1 capsule (200 mg total) by mouth 3 (three) times a day as needed for cough (Patient not taking: Reported on 5/9/2023)   • [DISCONTINUED] fluticasone (FLONASE) 50 mcg/act nasal spray 1 spray into each nostril daily (Patient not taking: Reported on 5/9/2023)       Objective     /78   Temp 97 8 °F (36 6 °C)   Ht 5' 8 31\" (1 735 m)   Wt 93 9 kg (207 lb)   BMI 31 19 kg/m²     Physical Exam  Constitutional:       General: He is not in acute distress  Appearance: He is well-developed  HENT:      Head: Normocephalic and atraumatic  Right Ear: Tympanic membrane normal  There is no impacted cerumen  Left Ear: Tympanic membrane normal  There is no impacted cerumen  Nose: Congestion and rhinorrhea present  Right Sinus: No maxillary sinus tenderness or frontal sinus tenderness  Left Sinus: No maxillary sinus tenderness or frontal sinus tenderness  Mouth/Throat:      Mouth: Mucous membranes are moist    Eyes:      Conjunctiva/sclera: Conjunctivae normal    Cardiovascular:      Rate and Rhythm: Normal rate and regular rhythm  Pulses: Normal pulses  Heart sounds: Normal heart sounds  Pulmonary:      Effort: Pulmonary effort is normal       Breath sounds: Normal breath sounds  Abdominal:      General: Abdomen is flat  Skin:     Capillary Refill: Capillary refill takes less than 2 seconds   " Neurological:      General: No focal deficit present  Mental Status: He is alert and oriented to person, place, and time     Psychiatric:         Mood and Affect: Mood normal          Judgment: Judgment normal        Phill Roberts DO

## 2023-06-14 DIAGNOSIS — I65.21 STENOSIS OF RIGHT CAROTID ARTERY: ICD-10-CM

## 2023-06-14 DIAGNOSIS — I63.81 LEFT SIDED LACUNAR INFARCTION (HCC): ICD-10-CM

## 2023-06-14 RX ORDER — CLOPIDOGREL BISULFATE 75 MG/1
TABLET ORAL
Qty: 90 TABLET | Refills: 1 | Status: SHIPPED | OUTPATIENT
Start: 2023-06-14

## 2023-06-22 ENCOUNTER — OFFICE VISIT (OUTPATIENT)
Dept: FAMILY MEDICINE CLINIC | Facility: CLINIC | Age: 78
End: 2023-06-22
Payer: COMMERCIAL

## 2023-06-22 VITALS
SYSTOLIC BLOOD PRESSURE: 122 MMHG | HEART RATE: 74 BPM | WEIGHT: 207.6 LBS | BODY MASS INDEX: 29.72 KG/M2 | DIASTOLIC BLOOD PRESSURE: 62 MMHG | HEIGHT: 70 IN | OXYGEN SATURATION: 99 % | TEMPERATURE: 97.4 F

## 2023-06-22 DIAGNOSIS — I87.2 VENOUS STASIS DERMATITIS OF BOTH LOWER EXTREMITIES: Primary | ICD-10-CM

## 2023-06-22 PROCEDURE — 99213 OFFICE O/P EST LOW 20 MIN: CPT | Performed by: FAMILY MEDICINE

## 2023-06-22 RX ORDER — TRIAMCINOLONE ACETONIDE 1 MG/G
CREAM TOPICAL 2 TIMES DAILY
Qty: 30 G | Refills: 2 | Status: SHIPPED | OUTPATIENT
Start: 2023-06-22

## 2023-06-22 NOTE — PROGRESS NOTES
Chief Complaint   Patient presents with   • Rash     Pt didn't know it was there no pain or itching  \Kate Sharif      : 1945      MRN: [id number]  Encounter Provider: Rosie Carr DO  Encounter Date: 2023   Encounter department: [facility] PRIMARY CARE    Assessment & Plan     1  Venous stasis dermatitis of both lower extremities  Comments:  Medication as directed, should also use a moisturizing lotion on his lower extremities daily  Orders:  -     triamcinolone (KENALOG) 0 1 % cream; Apply topically 2 (two) times a day           Subjective      He presents to the office complaining of an itchy rash on both of his lower extremities  He does have some trace edema, and has crept up over time  No pain, redness or heat  Review of Systems   Constitutional: Negative for chills and fever  HENT: Negative for ear pain and sore throat  Eyes: Negative for pain and visual disturbance  Respiratory: Negative for cough and shortness of breath  Cardiovascular: Positive for leg swelling  Negative for chest pain and palpitations  Gastrointestinal: Negative for abdominal pain and vomiting  Genitourinary: Negative for dysuria and hematuria  Musculoskeletal: Negative for arthralgias and back pain  Skin: Positive for rash  Negative for color change  Neurological: Negative for seizures and syncope  All other systems reviewed and are negative        Current Outpatient Medications on File Prior to Visit   Medication Sig   • acetaminophen (TYLENOL) 325 mg tablet Take 650 mg by mouth every 6 (six) hours as needed for mild pain As needed   • amLODIPine (NORVASC) 10 mg tablet TAKE 1 TABLET BY MOUTH EVERY DAY   • atorvastatin (LIPITOR) 40 mg tablet TAKE 1 TABLET BY MOUTH EVERY DAY WITH DINNER   • benzonatate (TESSALON) 200 MG capsule Take 1 capsule (200 mg total) by mouth 3 (three) times a day as needed for cough   • clopidogrel (PLAVIX) 75 mg tablet TAKE 1 TABLET BY "MOUTH EVERY DAY   • furosemide (LASIX) 20 mg tablet Take 1 tablet (20 mg total) by mouth every other day Take it in the morning  • losartan (COZAAR) 25 mg tablet TAKE 1 TABLET (25 MG TOTAL) BY MOUTH DAILY  • montelukast (SINGULAIR) 10 mg tablet Take 1 tablet (10 mg total) by mouth daily at bedtime   • spironolactone (ALDACTONE) 25 mg tablet Take 0 5 tablets (12 5 mg total) by mouth daily       Objective     /62 (BP Location: Left arm, Patient Position: Sitting, Cuff Size: Large)   Pulse 74   Temp (!) 97 4 °F (36 3 °C) (Temporal)   Ht 5' 10\" (1 778 m)   Wt 94 2 kg (207 lb 9 6 oz)   SpO2 99%   BMI 29 79 kg/m²     Physical Exam  Vitals and nursing note reviewed  Constitutional:       Appearance: Normal appearance  He is well-developed  HENT:      Head: Normocephalic and atraumatic  Right Ear: Tympanic membrane and ear canal normal       Left Ear: Tympanic membrane and ear canal normal       Nose: Nose normal    Eyes:      Conjunctiva/sclera: Conjunctivae normal       Pupils: Pupils are equal, round, and reactive to light  Neck:      Thyroid: No thyromegaly  Vascular: No JVD  Trachea: No tracheal deviation  Cardiovascular:      Rate and Rhythm: Normal rate and regular rhythm  Heart sounds: No murmur heard  Pulmonary:      Effort: Pulmonary effort is normal       Breath sounds: Normal breath sounds  No wheezing or rales  Abdominal:      General: Bowel sounds are normal       Palpations: Abdomen is soft  There is no mass  Tenderness: There is no abdominal tenderness  There is no guarding or rebound  Musculoskeletal:         General: No tenderness or deformity  Cervical back: Normal range of motion and neck supple  Lymphadenopathy:      Cervical: No cervical adenopathy  Skin:     General: Skin is warm and dry  Capillary Refill: Capillary refill takes less than 2 seconds  Findings: Rash (Macular dry rash on bilateral lower extremities, somewhat patchy   " Has some nonblanchable aspect to it  Multiple varicosities  ) present  No lesion  Nails: There is no clubbing  Neurological:      General: No focal deficit present  Mental Status: He is alert and oriented to person, place, and time  Cranial Nerves: No cranial nerve deficit  Motor: No abnormal muscle tone  Coordination: Coordination normal       Deep Tendon Reflexes: Reflexes are normal and symmetric   Reflexes normal    Psychiatric:         Mood and Affect: Mood normal          Judgment: Judgment normal        Timothy Cooper,

## 2023-07-21 ENCOUNTER — OFFICE VISIT (OUTPATIENT)
Dept: SLEEP CENTER | Facility: CLINIC | Age: 78
End: 2023-07-21
Payer: COMMERCIAL

## 2023-07-21 VITALS
BODY MASS INDEX: 29.49 KG/M2 | WEIGHT: 206 LBS | SYSTOLIC BLOOD PRESSURE: 124 MMHG | DIASTOLIC BLOOD PRESSURE: 66 MMHG | HEIGHT: 70 IN

## 2023-07-21 DIAGNOSIS — G47.33 OBSTRUCTIVE SLEEP APNEA TREATED WITH CONTINUOUS POSITIVE AIRWAY PRESSURE (CPAP): Primary | ICD-10-CM

## 2023-07-21 DIAGNOSIS — Z99.89 OBSTRUCTIVE SLEEP APNEA TREATED WITH CONTINUOUS POSITIVE AIRWAY PRESSURE (CPAP): Primary | ICD-10-CM

## 2023-07-21 PROCEDURE — 99214 OFFICE O/P EST MOD 30 MIN: CPT | Performed by: NURSE PRACTITIONER

## 2023-07-21 NOTE — PATIENT INSTRUCTIONS
1.  Continue use of CPAP equipment nightly  2. Continue to clean your equipment, as discussed  3. Schedule an appointment with Qamar Wiggins on a Tuesday or Friday when Chai Ayala is scheduled in about 3 months to bring in your machine to gather a compliance report. 3.  Contact the [facility] with any questions or concerns prior to your next visit, as needed  4. Schedule visit for follow-up in 6 months      Nursing Support:  When: Monday through Friday 7A-5PM except holidays  Where: Our direct line is [phone]. If you are having a true emergency please call 911. In the event that the line is busy or it is after hours please leave a voice message and we will return your call. Please speak clearly, leaving your full name, birth date, best number to reach you and the reason for your call. Medication refills: We will need the name of the medication, the dosage, the ordering provider, whether you get a 30 or 90 day refill, and the pharmacy name and address. Medications will be ordered by the provider only. Nurses cannot call in prescriptions. Please allow 7 days for medication refills. Physician requested updates: If your provider requested that you call with an update after starting medication, please be ready to provide us the medication and dosage, what time you take your medication, the time you attempt to fall asleep, time you fall asleep, when you wake up, and what time you get out of bed. Sleep Study Results: We will contact you with sleep study results and/or next steps after the physician has reviewed your testing.

## 2023-07-21 NOTE — PROGRESS NOTES
Progress Note - [place] 76 y.o. male   :1945, MRN: [id number]  2023          Follow Up Evaluation / Problem:     Mild obstructive Sleep Apnea      Thank you for the opportunity of participating in the evaluation and care of this patient in the Sleep Clinic at [facility]. HPI: Lora Carson is a 76y.o. year old male. The patient presents with his wife for follow up of mild obstructive sleep apnea. He had concern of excessive daytime sleepiness and restlessness during sleep. He completed a home sleep study in 2022. ADARSH was 10.6 with an oxygen mariia of 87% with 4% of the study spent with saturations below 90%. During the consultation his wife also reported evidence of acting out of dreams including movement of his arms and yelling at times. A CPAP titration study with evaluation of possible REM behavior disorder was completed in 2022. He was set up with CPAP equipment in 2022. He presents to review compliance with use of the equipment and effectiveness of treatment. Current Outpatient Medications:   •  acetaminophen (TYLENOL) 325 mg tablet, Take 650 mg by mouth every 6 (six) hours as needed for mild pain As needed, Disp: , Rfl:   •  amLODIPine (NORVASC) 10 mg tablet, TAKE 1 TABLET BY MOUTH EVERY DAY, Disp: 90 tablet, Rfl: 1  •  atorvastatin (LIPITOR) 40 mg tablet, TAKE 1 TABLET BY MOUTH EVERY DAY WITH DINNER, Disp: 90 tablet, Rfl: 1  •  clopidogrel (PLAVIX) 75 mg tablet, TAKE 1 TABLET BY MOUTH EVERY DAY, Disp: 90 tablet, Rfl: 1  •  furosemide (LASIX) 20 mg tablet, Take 1 tablet (20 mg total) by mouth every other day Take it in the morning., Disp: 45 tablet, Rfl: 3  •  losartan (COZAAR) 25 mg tablet, TAKE 1 TABLET (25 MG TOTAL) BY MOUTH DAILY. , Disp: 90 tablet, Rfl: 1  •  montelukast (SINGULAIR) 10 mg tablet, Take 1 tablet (10 mg total) by mouth daily at bedtime, Disp: 90 tablet, Rfl: 1  •  spironolactone (ALDACTONE) 25 mg tablet, Take 0.5 tablets (12.5 mg total) by mouth daily, Disp: 45 tablet, Rfl: 3  •  triamcinolone (KENALOG) 0.1 % cream, Apply topically 2 (two) times a day, Disp: 30 g, Rfl: 2  •  benzonatate (TESSALON) 200 MG capsule, Take 1 capsule (200 mg total) by mouth 3 (three) times a day as needed for cough (Patient not taking: Reported on 7/21/2023), Disp: 20 capsule, Rfl: 0    How likely are you to doze off or fall asleep in the following situations, in contrast to feeling just tired? Sitting and reading: High chance of dozing  Watching TV: High chance of dozing  Sitting, inactive in a public place (e.g. a theatre or a meeting): Would never doze  As a passenger in a car for an hour without a break: Slight chance of dozing  Lying down to rest in the afternoon when circumstances permit: High chance of dozing  Sitting and talking to someone: Would never doze  Sitting quietly after a lunch without alcohol: Would never doze  In a car, while stopped for a few minutes in traffic: Would never doze  Total score: 10              Vitals:    07/21/23 1322   BP: 124/66   Weight: 93.4 kg (206 lb)   Height: 5' 10" (1.778 m)       Body mass index is 29.56 kg/m². EPWORTH SLEEPINESS SCORE  Total score: 10      Past History Since Last Sleep Center Visit:   He denies any significant changes to his health since his last visit. He continues to use his CPAP equipment. Setting feels comfortable, although at times, he still feels like it becomes too strong and he has air leaks from the mask. Overall, he feels he sleeps more restfully since using CPAP equipment. His wife notices less movement during sleep. Daytime sleepiness has improved. He does not feel tired during the day when active, but could doze when sedentary. The patient reports that he cleans the equipment appropriately, using mild soap and water and changes supplies regularly. .      The review of systems and following portions of the patient's history were reviewed and updated as appropriate: allergies, current medications, past family history, past medical history, past social history, past surgical history, and problem list.        OBJECTIVE  Equipment set up date: 10/31/2022  PAP Pressure: APAP 9-11.5, done in office today  (previous setting was 9-13cm)  Type of mask used: full face  DME Provider: [employer]    Physical Exam:     General Appearance:   Alert, cooperative, no distress, appears stated age, obese     Head:   Normocephalic, without obvious abnormality, atraumatic     Lungs:    Heart:  Clear to auscultation bilaterally, respirations unlabored      Regular rate and rhythm, S1 and S2 normal, no murmur, rub or gallop          ASSESSMENT / PLAN    1. Obstructive sleep apnea treated with continuous positive airway pressure (CPAP)  PAP DME Pressure Change    PAP DME Resupply/Reorder              Counseling / Coordination of Care  I have spent a total time of 25 minutes on 7/21/23 in caring for this patient including Risks and benefits of tx options, Instructions for management, Patient and family education, Importance of tx compliance, Risk factor reductions, Impressions, Counseling / Coordination of care, Documenting in the medical record and Reviewing / ordering tests, medicine, procedures  . Today I reviewed the most recent 30 days of compliance data. He has been able to use the equipment 93.3% of all days recorded. Average usage was 4 or more hours 93.3% of all days recorded. (he reports that he uses it 100% of the day, however, the last 2 days of data are not showing on the report.)   The patient uses the equipment for an average of 7 hours and 41 minutes per night. The estimated AHI is 5.4 abnormal breathing events per hour, including 3.4 CA (this is improved from initial compliance). A pressure change to 9-11.5cm has been ordered today.   The patient feels they benefit from the use of PAP equipment and would like to continue PAP therapy. Response to treatment has been very good. A prescription for supplies has been provided to last for the next year. He will continue using this equipment at the settings noted above for the next 6 months. At that timehe will then return for a routine follow-up evaluation. I have asked the patient to contact the Sleep [facility] if he encounters any difficulties prior to that time. The following instructions have been given to the patient today:    Patient Instructions   1. Continue use of CPAP equipment nightly  2. Continue to clean your equipment, as discussed  3. Schedule an appointment with Qamar Wiggins on a Tuesday or Friday when Kareenmigel Rendonara is scheduled in about 3 months to bring in your machine to gather a compliance report. 3.  Contact the [facility] Center with any questions or concerns prior to your next visit, as needed  4. Schedule visit for follow-up in 6 months      Nursing Support:  When: Monday through Friday 7A-5PM except holidays  Where: Our direct line is [phone]. If you are having a true emergency please call 911. In the event that the line is busy or it is after hours please leave a voice message and we will return your call. Please speak clearly, leaving your full name, birth date, best number to reach you and the reason for your call. Medication refills: We will need the name of the medication, the dosage, the ordering provider, whether you get a 30 or 90 day refill, and the pharmacy name and address. Medications will be ordered by the provider only. Nurses cannot call in prescriptions. Please allow 7 days for medication refills. Physician requested updates:  If your provider requested that you call with an update after starting medication, please be ready to provide us the medication and dosage, what time you take your medication, the time you attempt to fall asleep, time you fall asleep, when you wake up, and what time you get out of bed. Sleep Study Results: We will contact you with sleep study results and/or next steps after the physician has reviewed your testing. Milana Childs, [address]      Portions of the record may have been created with voice recognition software. Occasional wrong word or "sound a like" substitutions may have occurred due to the inherent limitations of voice recognition software. Read the chart carefully and recognize, using context, where substitutions have occurred.

## 2023-07-24 ENCOUNTER — TELEPHONE (OUTPATIENT)
Dept: SLEEP CENTER | Facility: CLINIC | Age: 78
End: 2023-07-24

## 2023-07-25 LAB

## 2023-09-19 ENCOUNTER — OFFICE VISIT (OUTPATIENT)
Dept: FAMILY MEDICINE CLINIC | Facility: CLINIC | Age: 78
End: 2023-09-19
Payer: COMMERCIAL

## 2023-09-19 ENCOUNTER — APPOINTMENT (OUTPATIENT)
Dept: LAB | Facility: CLINIC | Age: 78
End: 2023-09-19
Payer: COMMERCIAL

## 2023-09-19 VITALS
HEART RATE: 75 BPM | HEIGHT: 70 IN | TEMPERATURE: 96.3 F | BODY MASS INDEX: 29.49 KG/M2 | SYSTOLIC BLOOD PRESSURE: 116 MMHG | WEIGHT: 206 LBS | OXYGEN SATURATION: 98 % | DIASTOLIC BLOOD PRESSURE: 74 MMHG

## 2023-09-19 DIAGNOSIS — I50.32 CHRONIC DIASTOLIC HEART FAILURE WITH PRESERVED EJECTION FRACTION (HCC): ICD-10-CM

## 2023-09-19 DIAGNOSIS — I63.81 LEFT SIDED LACUNAR INFARCTION (HCC): ICD-10-CM

## 2023-09-19 DIAGNOSIS — E55.9 VITAMIN D DEFICIENCY: ICD-10-CM

## 2023-09-19 DIAGNOSIS — R73.03 PREDIABETES: ICD-10-CM

## 2023-09-19 DIAGNOSIS — Z23 NEED FOR VACCINATION: ICD-10-CM

## 2023-09-19 DIAGNOSIS — G47.33 OBSTRUCTIVE SLEEP APNEA SYNDROME: ICD-10-CM

## 2023-09-19 DIAGNOSIS — Z66 DNR (DO NOT RESUSCITATE): ICD-10-CM

## 2023-09-19 DIAGNOSIS — I10 PRIMARY HYPERTENSION: ICD-10-CM

## 2023-09-19 DIAGNOSIS — E78.2 MIXED HYPERLIPIDEMIA: ICD-10-CM

## 2023-09-19 DIAGNOSIS — I87.2 VENOUS STASIS DERMATITIS OF BOTH LOWER EXTREMITIES: Primary | ICD-10-CM

## 2023-09-19 PROBLEM — H93.8X3 SENSATION OF FULLNESS IN BOTH EARS: Status: RESOLVED | Noted: 2022-06-28 | Resolved: 2023-09-19

## 2023-09-19 PROBLEM — Z79.01 LONG TERM CURRENT USE OF ANTICOAGULANT: Status: ACTIVE | Noted: 2023-02-07

## 2023-09-19 LAB
25(OH)D3 SERPL-MCNC: 41.3 NG/ML (ref 30–100)
ALBUMIN SERPL BCP-MCNC: 4.8 G/DL (ref 3.5–5)
ALP SERPL-CCNC: 78 U/L (ref 34–104)
ALT SERPL W P-5'-P-CCNC: 32 U/L (ref 7–52)
ANION GAP SERPL CALCULATED.3IONS-SCNC: 8 MMOL/L
AST SERPL W P-5'-P-CCNC: 27 U/L (ref 13–39)
BACTERIA UR QL AUTO: ABNORMAL /HPF
BASOPHILS # BLD AUTO: 0.02 THOUSANDS/ÂΜL (ref 0–0.1)
BASOPHILS NFR BLD AUTO: 0 % (ref 0–1)
BILIRUB SERPL-MCNC: 1.09 MG/DL (ref 0.2–1)
BILIRUB UR QL STRIP: NEGATIVE
BUN SERPL-MCNC: 21 MG/DL (ref 5–25)
CALCIUM SERPL-MCNC: 10.5 MG/DL (ref 8.4–10.2)
CHLORIDE SERPL-SCNC: 105 MMOL/L (ref 96–108)
CHOLEST SERPL-MCNC: 151 MG/DL
CLARITY UR: CLEAR
CO2 SERPL-SCNC: 28 MMOL/L (ref 21–32)
COLOR UR: YELLOW
CREAT SERPL-MCNC: 0.91 MG/DL (ref 0.6–1.3)
EOSINOPHIL # BLD AUTO: 0.05 THOUSAND/ÂΜL (ref 0–0.61)
EOSINOPHIL NFR BLD AUTO: 1 % (ref 0–6)
ERYTHROCYTE [DISTWIDTH] IN BLOOD BY AUTOMATED COUNT: 13.2 % (ref 11.6–15.1)
EST. AVERAGE GLUCOSE BLD GHB EST-MCNC: 131 MG/DL
GFR SERPL CREATININE-BSD FRML MDRD: 79 ML/MIN/1.73SQ M
GLUCOSE P FAST SERPL-MCNC: 86 MG/DL (ref 65–99)
GLUCOSE UR STRIP-MCNC: NEGATIVE MG/DL
HBA1C MFR BLD: 6.2 %
HCT VFR BLD AUTO: 45.6 % (ref 36.5–49.3)
HDLC SERPL-MCNC: 55 MG/DL
HGB BLD-MCNC: 14.7 G/DL (ref 12–17)
HGB UR QL STRIP.AUTO: NEGATIVE
IMM GRANULOCYTES # BLD AUTO: 0.03 THOUSAND/UL (ref 0–0.2)
IMM GRANULOCYTES NFR BLD AUTO: 0 % (ref 0–2)
KETONES UR STRIP-MCNC: NEGATIVE MG/DL
LDLC SERPL CALC-MCNC: 74 MG/DL (ref 0–100)
LEUKOCYTE ESTERASE UR QL STRIP: NEGATIVE
LYMPHOCYTES # BLD AUTO: 1.61 THOUSANDS/ÂΜL (ref 0.6–4.47)
LYMPHOCYTES NFR BLD AUTO: 22 % (ref 14–44)
MCH RBC QN AUTO: 30 PG (ref 26.8–34.3)
MCHC RBC AUTO-ENTMCNC: 32.2 G/DL (ref 31.4–37.4)
MCV RBC AUTO: 93 FL (ref 82–98)
MONOCYTES # BLD AUTO: 0.65 THOUSAND/ÂΜL (ref 0.17–1.22)
MONOCYTES NFR BLD AUTO: 9 % (ref 4–12)
MUCOUS THREADS UR QL AUTO: ABNORMAL
NEUTROPHILS # BLD AUTO: 5.11 THOUSANDS/ÂΜL (ref 1.85–7.62)
NEUTS SEG NFR BLD AUTO: 68 % (ref 43–75)
NITRITE UR QL STRIP: NEGATIVE
NON-SQ EPI CELLS URNS QL MICRO: ABNORMAL /HPF
NONHDLC SERPL-MCNC: 96 MG/DL
NRBC BLD AUTO-RTO: 0 /100 WBCS
PH UR STRIP.AUTO: 6 [PH]
PLATELET # BLD AUTO: 185 THOUSANDS/UL (ref 149–390)
PMV BLD AUTO: 11.3 FL (ref 8.9–12.7)
POTASSIUM SERPL-SCNC: 4.7 MMOL/L (ref 3.5–5.3)
PROT SERPL-MCNC: 7.2 G/DL (ref 6.4–8.4)
PROT UR STRIP-MCNC: ABNORMAL MG/DL
RBC # BLD AUTO: 4.9 MILLION/UL (ref 3.88–5.62)
RBC #/AREA URNS AUTO: ABNORMAL /HPF
SODIUM SERPL-SCNC: 141 MMOL/L (ref 135–147)
SP GR UR STRIP.AUTO: 1.03 (ref 1–1.03)
T4 SERPL-MCNC: 6.86 UG/DL (ref 6.09–12.23)
TRIGL SERPL-MCNC: 108 MG/DL
TSH SERPL DL<=0.05 MIU/L-ACNC: 1.42 UIU/ML (ref 0.45–4.5)
UROBILINOGEN UR STRIP-ACNC: <2 MG/DL
WBC # BLD AUTO: 7.47 THOUSAND/UL (ref 4.31–10.16)
WBC #/AREA URNS AUTO: ABNORMAL /HPF

## 2023-09-19 PROCEDURE — 99214 OFFICE O/P EST MOD 30 MIN: CPT | Performed by: FAMILY MEDICINE

## 2023-09-19 PROCEDURE — 83036 HEMOGLOBIN GLYCOSYLATED A1C: CPT

## 2023-09-19 PROCEDURE — 85025 COMPLETE CBC W/AUTO DIFF WBC: CPT

## 2023-09-19 PROCEDURE — G0008 ADMIN INFLUENZA VIRUS VAC: HCPCS

## 2023-09-19 PROCEDURE — 90662 IIV NO PRSV INCREASED AG IM: CPT

## 2023-09-19 PROCEDURE — 80053 COMPREHEN METABOLIC PANEL: CPT

## 2023-09-19 PROCEDURE — 84443 ASSAY THYROID STIM HORMONE: CPT

## 2023-09-19 PROCEDURE — 81001 URINALYSIS AUTO W/SCOPE: CPT

## 2023-09-19 PROCEDURE — 36415 COLL VENOUS BLD VENIPUNCTURE: CPT

## 2023-09-19 PROCEDURE — 80061 LIPID PANEL: CPT

## 2023-09-19 PROCEDURE — 84436 ASSAY OF TOTAL THYROXINE: CPT

## 2023-09-19 PROCEDURE — 82306 VITAMIN D 25 HYDROXY: CPT

## 2023-09-19 NOTE — PROGRESS NOTES
Name: Jeison Waters      : 1945      MRN: [id number]  Encounter Provider: Tony Clemens DO  Encounter Date: 2023   Encounter department: [facility] PRIMARY CARE    Assessment & Plan     1. Venous stasis dermatitis of both lower extremities  Comments:  Recommend support stockings, keep legs elevated is much as possible. Check lab. 2. Primary hypertension  Comments:  Well-controlled with amlodipine 10 mg daily, furosemide 20 mg daily, losartan 25 mg daily, Aldactone 25 mg daily. Recheck in 6 months    3. Need for vaccination  -     influenza vaccine, high-dose, PF 0.7 mL (FLUZONE HIGH-DOSE)    4. Mixed hyperlipidemia  Comments:  Due for lab work, continue atorvastatin 40 mg daily. 5. Chronic diastolic heart failure with preserved ejection fraction [facility])  Comments:  Saw cardiology in April, continue losartan 25 mg daily, Lasix 20 mg daily, Aldactone 25 mg daily. Recheck in this office in 6 months    6. Left sided lacunar infarction [facility])  Comments:  Continue Plavix 75 mg daily. Atorvastatin 40 mg daily. 7. Obstructive sleep apnea syndrome  Comments:  Continue CPAP. 8. Vitamin D deficiency  Comments:  Due for lab, continue current supplementation. 9. DNR (do not resuscitate)           Subjective      He presents to the office complaining of continued swelling of his lower extremities with some redness around the ankles. This is a chronic issue for the patient. Really no different than prior. Also here for follow-up on his blood pressure, history of stroke, sleep apnea, heart failure, hyperlipidemia, and sugar. He was post have blood work done prior to today's visit, but he forgot to get it. Orders are in the system. He would like to get a flu shot today. Review of Systems   Constitutional: Negative for appetite change, chills, diaphoresis, fatigue, fever and unexpected weight change.    HENT: Negative for congestion, ear pain, hearing loss, nosebleeds, postnasal drip, rhinorrhea, sinus pressure, sore throat, tinnitus and trouble swallowing. Eyes: Negative for photophobia, pain, discharge, redness, itching and visual disturbance. Respiratory: Negative for cough, chest tightness, shortness of breath and wheezing. Cardiovascular: Positive for leg swelling. Negative for chest pain and palpitations. Denies orthopnea , dyspnea on exertion   Gastrointestinal: Negative for abdominal distention, abdominal pain, blood in stool, constipation, diarrhea, nausea and vomiting. Endocrine: Negative. Genitourinary: Negative for difficulty urinating, dysuria, flank pain, frequency, hematuria and urgency. Denies nocturia , erectile dysfunction   Musculoskeletal: Negative for arthralgias, back pain, gait problem, joint swelling and myalgias. Skin: Positive for rash. Negative for pallor and wound. Denies skin lesions   Allergic/Immunologic: Negative for environmental allergies, food allergies and immunocompromised state. Neurological: Negative for dizziness, tremors, seizures, syncope, speech difficulty, weakness, numbness and headaches. Hematological: Negative for adenopathy. Does not bruise/bleed easily. Psychiatric/Behavioral: Negative for behavioral problems, confusion, sleep disturbance and suicidal ideas. The patient is not nervous/anxious.       Chief Complaint   Patient presents with   • Follow-up     4 month check up    • Leg Swelling     Swelling in both legs and redness at ankles      Current Outpatient Medications on File Prior to Visit   Medication Sig   • acetaminophen (TYLENOL) 325 mg tablet Take 650 mg by mouth every 6 (six) hours as needed for mild pain As needed   • amLODIPine (NORVASC) 10 mg tablet TAKE 1 TABLET BY MOUTH EVERY DAY   • atorvastatin (LIPITOR) 40 mg tablet TAKE 1 TABLET BY MOUTH EVERY DAY WITH DINNER   • clopidogrel (PLAVIX) 75 mg tablet TAKE 1 TABLET BY MOUTH EVERY DAY   • furosemide (LASIX) 20 mg tablet Take 1 tablet (20 mg total) by mouth every other day Take it in the morning. • losartan (COZAAR) 25 mg tablet TAKE 1 TABLET (25 MG TOTAL) BY MOUTH DAILY. • montelukast (SINGULAIR) 10 mg tablet Take 1 tablet (10 mg total) by mouth daily at bedtime   • spironolactone (ALDACTONE) 25 mg tablet Take 0.5 tablets (12.5 mg total) by mouth daily   • triamcinolone (KENALOG) 0.1 % cream Apply topically 2 (two) times a day   • benzonatate (TESSALON) 200 MG capsule Take 1 capsule (200 mg total) by mouth 3 (three) times a day as needed for cough (Patient not taking: Reported on 7/21/2023)       Objective     /74   Pulse 75   Temp (!) 96.3 °F (35.7 °C) (Temporal)   Ht 5' 10" (1.778 m)   Wt 93.4 kg (206 lb)   SpO2 98%   BMI 29.56 kg/m²     Physical Exam  Constitutional:       Appearance: He is well-developed. HENT:      Head: Normocephalic and atraumatic. Right Ear: Tympanic membrane and ear canal normal.      Left Ear: Tympanic membrane and ear canal normal.      Nose: Nose normal.   Eyes:      Conjunctiva/sclera: Conjunctivae normal.      Pupils: Pupils are equal, round, and reactive to light. Neck:      Thyroid: No thyromegaly. Vascular: No JVD. Trachea: No tracheal deviation. Cardiovascular:      Rate and Rhythm: Normal rate and regular rhythm. Heart sounds: No murmur heard. Comments: Stasis changes of the skin bilateral lower extremities below the knee. No induration or erythema. Pulmonary:      Effort: Pulmonary effort is normal.      Breath sounds: Normal breath sounds. No wheezing or rales. Abdominal:      General: Bowel sounds are normal.      Palpations: Abdomen is soft. There is no mass. Tenderness: There is no abdominal tenderness. There is no guarding or rebound. Musculoskeletal:         General: No tenderness or deformity. Cervical back: Normal range of motion and neck supple. Right lower leg: 3+ Edema present. Left lower leg: 3+ Pitting Edema present. Lymphadenopathy:      Cervical: No cervical adenopathy. Skin:     General: Skin is warm and dry. Findings: No lesion or rash. Nails: There is no clubbing. Neurological:      Mental Status: He is alert and oriented to person, place, and time. Cranial Nerves: No cranial nerve deficit. Motor: No abnormal muscle tone. Coordination: Coordination normal.      Deep Tendon Reflexes: Reflexes are normal and symmetric.  Reflexes normal.   Psychiatric:         Judgment: Judgment normal.       Jeralene Shadow, DO

## 2023-10-05 DIAGNOSIS — I10 PRIMARY HYPERTENSION: ICD-10-CM

## 2023-10-05 RX ORDER — LOSARTAN POTASSIUM 25 MG/1
25 TABLET ORAL DAILY
Qty: 90 TABLET | Refills: 1 | Status: SHIPPED | OUTPATIENT
Start: 2023-10-05

## 2023-10-11 ENCOUNTER — TELEPHONE (OUTPATIENT)
Dept: FAMILY MEDICINE CLINIC | Facility: CLINIC | Age: 78
End: 2023-10-11

## 2023-10-11 NOTE — TELEPHONE ENCOUNTER
Patient's wife left a voicemail stating patient has been very tired lately. His BP has been running 100-120/60, and she is concerned.   Asking for advice, thank you

## 2023-10-23 DIAGNOSIS — I61.9 HEMORRHAGIC STROKE (HCC): ICD-10-CM

## 2023-10-23 DIAGNOSIS — I10 HYPERTENSION: ICD-10-CM

## 2023-10-23 RX ORDER — AMLODIPINE BESYLATE 10 MG/1
TABLET ORAL
Qty: 90 TABLET | Refills: 1 | Status: SHIPPED | OUTPATIENT
Start: 2023-10-23

## 2023-11-01 NOTE — PROGRESS NOTES
Cardiology Follow Up    Dara De Souza  1945  [id number]  [address] [address] [phone]    1. Other fatigue        2. Shortness of breath  NM myocardial perfusion spect (rx stress and/or rest)      3. Hypertension, unspecified type        4. Obstructive sleep apnea treated with continuous positive airway pressure (CPAP)        5. Dyslipidemia        6. Bilateral carotid artery disease, unspecified type [facility])            Interval History:   Mr Matthew Martins presents to our office with worsening fatigue and sleeping a lot. His wife has noted worsening shortness of breath with exertion. He denies CP, palpitations, lightheadedness or dizziness. His wife has noted he is eating one meal a day, appetite is less and he is more fatigued. Damien Molina states he needs to take frequent rests when working or cutting the grass. He notes he needs to take frequent deep breaths.         Medical History  Primary Cardiologist Dr Rivera Keeling  Hypertension  PAC  Dyslipidemia 9/19/23 , , HDL 55,LDL 74   CVA with hemorrhagic thalamic CVA in 10/2021  Carotid Artery Disease  Diastolic dysfunction without CHF  Prostate CA hx of prostatectomy   ARTURO compliant with CPAP   HgbA1C 6.2 on 9/19/23   Patient Active Problem List   Diagnosis    History of prostate cancer    Right internal carotid occlusion    Premature atrial contraction    Carotid stenosis, left    DNR (do not resuscitate)    Actinic keratosis    BMI 28.0-28.9,adult    thalamic hemorrhage history of lacunar stroke    Primary hypertension    Mixed hyperlipidemia    Prediabetes    Diastolic dysfunction    Chronic rhinitis    Obstructive sleep apnea treated with continuous positive airway pressure (CPAP)    Depression, recurrent (HCC)    Sensorineural hearing loss (SNHL) of both ears    Sleep behavior disorder, REM    Sequela of lacunar infarction    EKG, abnormal    Bradycardia    Difficulty using continuous positive airway pressure (CPAP) device    Chronic diastolic heart failure with preserved ejection fraction (HCC)    Hypertensive heart disease with chronic diastolic congestive heart failure ([address])    Long term current use of anticoagulant     Past Medical History:   Diagnosis Date    Cancer [facility])     prostate    Carotid stenosis     Hypertension     Left sided lacunar infarction ([address]) 12/30/2020     Social History     Socioeconomic History    Marital status: /Civil Union     Spouse name: Not on file    Number of children: Not on file    Years of education: Not on file    Highest education level: Not on file   Occupational History    Not on file   Tobacco Use    Smoking status: Former    Smokeless tobacco: Never   Vaping Use    Vaping Use: Never used   Substance and Sexual Activity    Alcohol use:  Yes     Alcohol/week: 4.0 standard drinks of alcohol     Types: 4 Cans of beer per week     Comment: several days a week    Drug use: Never    Sexual activity: Not on file   Other Topics Concern    Not on file   Social History Narrative    Not on file     Social Determinants of Health     Financial Resource Strain: Not on file   Food Insecurity: Not on file   Transportation Needs: Not on file   Physical Activity: Not on file   Stress: Not on file   Social Connections: Not on file   Intimate Partner Violence: Not on file   Housing Stability: Not on file      Family History   Problem Relation Age of Onset    Colon cancer Mother     No Known Problems Father     Leukemia Maternal Grandmother     Diabetes Maternal Grandmother     Alcohol abuse Maternal Grandfather     No Known Problems Paternal Grandmother     No Known Problems Paternal Grandfather      Past Surgical History:   Procedure Laterality Date    TONSILLECTOMY      TRANSURETHRAL RESECTION OF PROSTATE         Current Outpatient Medications:     acetaminophen (TYLENOL) 325 mg tablet, Take 650 mg by mouth every 6 (six) hours as needed for mild pain As needed, Disp: , Rfl:     amLODIPine (NORVASC) 10 mg tablet, TAKE 1 TABLET BY MOUTH EVERY DAY, Disp: 90 tablet, Rfl: 1    atorvastatin (LIPITOR) 40 mg tablet, TAKE 1 TABLET BY MOUTH EVERY DAY WITH DINNER, Disp: 90 tablet, Rfl: 1    benzonatate (TESSALON) 200 MG capsule, Take 1 capsule (200 mg total) by mouth 3 (three) times a day as needed for cough (Patient not taking: Reported on 7/21/2023), Disp: 20 capsule, Rfl: 0    clopidogrel (PLAVIX) 75 mg tablet, TAKE 1 TABLET BY MOUTH EVERY DAY, Disp: 90 tablet, Rfl: 1    furosemide (LASIX) 20 mg tablet, Take 1 tablet (20 mg total) by mouth every other day Take it in the morning., Disp: 45 tablet, Rfl: 3    losartan (COZAAR) 25 mg tablet, TAKE 1 TABLET (25 MG TOTAL) BY MOUTH DAILY. , Disp: 90 tablet, Rfl: 1    montelukast (SINGULAIR) 10 mg tablet, Take 1 tablet (10 mg total) by mouth daily at bedtime, Disp: 90 tablet, Rfl: 1    spironolactone (ALDACTONE) 25 mg tablet, Take 0.5 tablets (12.5 mg total) by mouth daily, Disp: 45 tablet, Rfl: 3    triamcinolone (KENALOG) 0.1 % cream, Apply topically 2 (two) times a day, Disp: 30 g, Rfl: 2  Allergies   Allergen Reactions    Ace Inhibitors Cough       Labs:  Appointment on 09/19/2023   Component Date Value    Sodium 09/19/2023 141     Potassium 09/19/2023 4.7     Chloride 09/19/2023 105     CO2 09/19/2023 28     ANION GAP 09/19/2023 8     BUN 09/19/2023 21     Creatinine 09/19/2023 0.91     Glucose, Fasting 09/19/2023 86     Calcium 09/19/2023 10.5 (H)     AST 09/19/2023 27     ALT 09/19/2023 32     Alkaline Phosphatase 09/19/2023 78     Total Protein 09/19/2023 7.2     Albumin 09/19/2023 4.8     Total Bilirubin 09/19/2023 1.09 (H)     eGFR 09/19/2023 79     WBC 09/19/2023 7.47     RBC 09/19/2023 4.90     Hemoglobin 09/19/2023 14.7     Hematocrit 09/19/2023 45.6     MCV 09/19/2023 93     MCH 09/19/2023 30.0     MCHC 09/19/2023 32.2     RDW 09/19/2023 13.2     MPV 09/19/2023 11.3 Platelets 55/70/5073 185     nRBC 09/19/2023 0     Neutrophils Relative 09/19/2023 68     Immat GRANS % 09/19/2023 0     Lymphocytes Relative 09/19/2023 22     Monocytes Relative 09/19/2023 9     Eosinophils Relative 09/19/2023 1     Basophils Relative 09/19/2023 0     Neutrophils Absolute 09/19/2023 5.11     Immature Grans Absolute 09/19/2023 0.03     Lymphocytes Absolute 09/19/2023 1.61     Monocytes Absolute 09/19/2023 0.65     Eosinophils Absolute 09/19/2023 0.05     Basophils Absolute 09/19/2023 0.02     Hemoglobin A1C 09/19/2023 6.2 (H)     EAG 09/19/2023 131     Cholesterol 09/19/2023 151     Triglycerides 09/19/2023 108     HDL, Direct 09/19/2023 55     LDL Calculated 09/19/2023 74     Non-HDL-Chol (CHOL-HDL) 09/19/2023 96     Vit D, 25-Hydroxy 09/19/2023 41.3     Color, UA 09/19/2023 Yellow     Clarity, UA 09/19/2023 Clear     Specific Gravity, UA 09/19/2023 1.027     pH, UA 09/19/2023 6.0     Leukocytes, UA 09/19/2023 Negative     Nitrite, UA 09/19/2023 Negative     Protein, UA 09/19/2023 30 (1+) (A)     Glucose, UA 09/19/2023 Negative     Ketones, UA 09/19/2023 Negative     Urobilinogen, UA 09/19/2023 <2.0     Bilirubin, UA 09/19/2023 Negative     Occult Blood, UA 09/19/2023 Negative     TSH 3RD GENERATON 09/19/2023 1.424     T4 TOTAL 09/19/2023 6.86     RBC, UA 09/19/2023 4-10 (A)     WBC, UA 09/19/2023 1-2     Epithelial Cells 09/19/2023 None Seen     Bacteria, UA 09/19/2023 None Seen     MUCUS THREADS 09/19/2023 Moderate (A)      Imaging: No results found. Review of Systems:  Review of Systems   Constitutional:  Positive for fatigue. Worsening activity tolerance    Respiratory:  Positive for shortness of breath. Physical Exam:  Physical Exam  Vitals reviewed. Constitutional:       Appearance: Normal appearance. Cardiovascular:      Rate and Rhythm: Normal rate and regular rhythm. Pulses: Normal pulses. Heart sounds: Normal heart sounds.       Comments: Ectopic beats Pulmonary:      Effort: Pulmonary effort is normal.      Breath sounds: Normal breath sounds. Abdominal:      General: Bowel sounds are normal.   Musculoskeletal:         General: Normal range of motion. Cervical back: Normal range of motion and neck supple. Right lower leg: No edema. Left lower leg: No edema. Skin:     General: Skin is warm and dry. Capillary Refill: Capillary refill takes less than 2 seconds. Neurological:      General: No focal deficit present. Mental Status: He is alert and oriented to person, place, and time. Psychiatric:         Mood and Affect: Mood normal.         Behavior: Behavior normal.         Discussion/Summary:  # Fatigue and shortness of breath, sleeping a lot, worsening activity tolerance with the need to rest frequently when working or cutting the grass. Michael needs to take deep breaths due to fatigue. EKG NSR non specific ST wave abnormality. He is not able to run on a treadmill due to fatigue and legs being weaker. I have ordered an Rx Nuclear stress test R/O ischemia contributing to symptoms.     # Hypertension RUE sitting 144/64, continue on amlodipine 10 mg daily, losartan 25 mg daily, Aldactone 12.5 mg daily, 20 mg every other day DASH diet  # Dyslipidemia 9/19/23 , , HDL 55,LDL 74 continue on Lipitor 40 mg daily, heart healthy diet  # CVA with hemorrhagic thalamic CVA in 10/2021  # Carotid Artery Disease continue on Plavix 75 mg daily, Lipitor 40 mg daily  # ARTURO compliant with CPAP

## 2023-11-02 ENCOUNTER — OFFICE VISIT (OUTPATIENT)
Dept: CARDIOLOGY CLINIC | Facility: CLINIC | Age: 78
End: 2023-11-02

## 2023-11-02 VITALS
BODY MASS INDEX: 29.66 KG/M2 | SYSTOLIC BLOOD PRESSURE: 110 MMHG | DIASTOLIC BLOOD PRESSURE: 60 MMHG | WEIGHT: 207.2 LBS | HEART RATE: 64 BPM | OXYGEN SATURATION: 97 % | HEIGHT: 70 IN

## 2023-11-02 DIAGNOSIS — E78.5 DYSLIPIDEMIA: ICD-10-CM

## 2023-11-02 DIAGNOSIS — I77.9 BILATERAL CAROTID ARTERY DISEASE, UNSPECIFIED TYPE (HCC): ICD-10-CM

## 2023-11-02 DIAGNOSIS — G47.33 OBSTRUCTIVE SLEEP APNEA TREATED WITH CONTINUOUS POSITIVE AIRWAY PRESSURE (CPAP): ICD-10-CM

## 2023-11-02 DIAGNOSIS — R53.83 OTHER FATIGUE: Primary | ICD-10-CM

## 2023-11-02 DIAGNOSIS — I10 HYPERTENSION, UNSPECIFIED TYPE: ICD-10-CM

## 2023-11-02 DIAGNOSIS — R06.02 SHORTNESS OF BREATH: ICD-10-CM

## 2023-11-08 ENCOUNTER — HOSPITAL ENCOUNTER (OUTPATIENT)
Dept: NON INVASIVE DIAGNOSTICS | Facility: HOSPITAL | Age: 78
Discharge: HOME | End: 2023-11-08
Payer: COMMERCIAL

## 2023-11-08 ENCOUNTER — HOSPITAL ENCOUNTER (OUTPATIENT)
Dept: NUCLEAR MEDICINE | Facility: HOSPITAL | Age: 78
Discharge: HOME | End: 2023-11-08
Payer: COMMERCIAL

## 2023-11-08 VITALS — BODY MASS INDEX: 29.63 KG/M2 | WEIGHT: 207 LBS | HEIGHT: 70 IN

## 2023-11-08 DIAGNOSIS — R06.02 SHORTNESS OF BREATH: ICD-10-CM

## 2023-11-08 LAB
CHEST PAIN STATEMENT: NORMAL
MAX DIASTOLIC BP: 88 MMHG
MAX HR PERCENT: 60 %
MAX HR: 85 BPM
MAX PREDICTED HEART RATE: 140 BPM
NUC STRESS EJECTION FRACTION: 66 %
PROTOCOL NAME: NORMAL
RATE PRESSURE PRODUCT: NORMAL
REASON FOR TERMINATION: NORMAL
SL CV REST NUCLEAR ISOTOPE DOSE: 10.55 MCI
SL CV STRESS NUCLEAR ISOTOPE DOSE: 31.9 MCI
SL CV STRESS RECOVERY BP: 163 MMHG
SL CV STRESS RECOVERY HR: 80 BPM
SL CV STRESS RECOVERY O2 SAT: 96 %
STRESS ANGINA INDEX: 0
STRESS BASELINE BP: NORMAL MMHG
STRESS BASELINE HR: 69 BPM
STRESS O2 SAT REST: 96 %
STRESS PEAK HR: 85 BPM
STRESS POST ESTIMATED WORKLOAD: 1 METS
STRESS POST EXERCISE DUR MIN: 3 MIN
STRESS POST EXERCISE DUR SEC: 1 SEC
STRESS POST O2 SAT PEAK: 95 %
STRESS POST PEAK BP: 118 MMHG
STRESS POST PEAK HR: 85 BPM
STRESS POST PEAK SYSTOLIC BP: 167 MMHG
STRESS/REST PERFUSION RATIO: 1.1
TARGET HR FORMULA: NORMAL
TEST INDICATION: NORMAL

## 2023-11-08 PROCEDURE — 78452 HT MUSCLE IMAGE SPECT MULT: CPT

## 2023-11-08 PROCEDURE — 78452 HT MUSCLE IMAGE SPECT MULT: CPT | Performed by: INTERNAL MEDICINE

## 2023-11-08 PROCEDURE — 93017 CV STRESS TEST TRACING ONLY: CPT

## 2023-11-08 PROCEDURE — A9502 TC99M TETROFOSMIN: HCPCS

## 2023-11-08 PROCEDURE — 93016 CV STRESS TEST SUPVJ ONLY: CPT | Performed by: INTERNAL MEDICINE

## 2023-11-08 PROCEDURE — 93018 CV STRESS TEST I&R ONLY: CPT | Performed by: INTERNAL MEDICINE

## 2023-11-08 PROCEDURE — G1004 CDSM NDSC: HCPCS

## 2023-11-08 RX ORDER — REGADENOSON 0.08 MG/ML
0.4 INJECTION, SOLUTION INTRAVENOUS ONCE
Status: COMPLETED | OUTPATIENT
Start: 2023-11-08 | End: 2023-11-08

## 2023-11-08 RX ADMIN — REGADENOSON 0.4 MG: 0.08 INJECTION, SOLUTION INTRAVENOUS at 09:41

## 2023-11-09 ENCOUNTER — TELEPHONE (OUTPATIENT)
Dept: CARDIOLOGY CLINIC | Facility: CLINIC | Age: 78
End: 2023-11-09

## 2023-11-09 NOTE — TELEPHONE ENCOUNTER
----- Message from Keri Bullock, [address] [place] [address] sent at 11/8/2023  8:34 PM EST -----  Please call Bethany Johnson and inform him the stress test was normal. Thank you. Spoke with pt re: normal ST rslts.

## 2023-11-29 DIAGNOSIS — I61.9 HEMORRHAGIC STROKE (HCC): ICD-10-CM

## 2023-11-29 DIAGNOSIS — I63.81 LEFT SIDED LACUNAR INFARCTION (HCC): ICD-10-CM

## 2023-11-29 DIAGNOSIS — E78.5 HYPERLIPIDEMIA: ICD-10-CM

## 2023-11-29 RX ORDER — ATORVASTATIN CALCIUM 40 MG/1
TABLET, FILM COATED ORAL
Qty: 90 TABLET | Refills: 1 | Status: SHIPPED | OUTPATIENT
Start: 2023-11-29

## 2023-12-12 ENCOUNTER — HOSPITAL ENCOUNTER (OUTPATIENT)
Dept: NON INVASIVE DIAGNOSTICS | Facility: CLINIC | Age: 78
Discharge: HOME | End: 2023-12-12
Payer: COMMERCIAL

## 2023-12-12 DIAGNOSIS — I65.22 CAROTID STENOSIS, LEFT: ICD-10-CM

## 2023-12-12 PROCEDURE — 93880 EXTRACRANIAL BILAT STUDY: CPT | Performed by: SURGERY

## 2023-12-12 PROCEDURE — 93880 EXTRACRANIAL BILAT STUDY: CPT

## 2023-12-15 DIAGNOSIS — I63.81 LEFT SIDED LACUNAR INFARCTION (HCC): ICD-10-CM

## 2023-12-15 DIAGNOSIS — I65.21 STENOSIS OF RIGHT CAROTID ARTERY: ICD-10-CM

## 2023-12-15 RX ORDER — CLOPIDOGREL BISULFATE 75 MG/1
TABLET ORAL
Qty: 90 TABLET | Refills: 1 | Status: SHIPPED | OUTPATIENT
Start: 2023-12-15

## 2023-12-23 ENCOUNTER — NURSE TRIAGE (OUTPATIENT)
Dept: OTHER | Facility: OTHER | Age: 78
End: 2023-12-23

## 2023-12-23 NOTE — TELEPHONE ENCOUNTER
"Regarding: COVID Positive/ medication request  ----- Message from Desi Pearl sent at 12/23/2023  7:36 AM EST -----  \"Hood has tested positive for COVID, is there a medication that can be sent out to him?\"    "

## 2024-01-09 DIAGNOSIS — I50.32 CHRONIC DIASTOLIC HEART FAILURE WITH PRESERVED EJECTION FRACTION (HCC): ICD-10-CM

## 2024-01-09 RX ORDER — FUROSEMIDE 20 MG/1
20 TABLET ORAL EVERY OTHER DAY
Qty: 45 TABLET | Refills: 3 | Status: SHIPPED | OUTPATIENT
Start: 2024-01-09

## 2024-01-24 DIAGNOSIS — I50.32 CHRONIC DIASTOLIC HEART FAILURE WITH PRESERVED EJECTION FRACTION (HCC): ICD-10-CM

## 2024-01-24 RX ORDER — SPIRONOLACTONE 25 MG/1
12.5 TABLET ORAL DAILY
Qty: 45 TABLET | Refills: 3 | Status: SHIPPED | OUTPATIENT
Start: 2024-01-24

## 2024-01-24 NOTE — TELEPHONE ENCOUNTER
Fax refill request from [facility]    Spironolactone 25mg 90 day supply

## 2024-02-28 ENCOUNTER — OFFICE VISIT (OUTPATIENT)
Dept: VASCULAR SURGERY | Facility: CLINIC | Age: 79
End: 2024-02-28
Payer: COMMERCIAL

## 2024-02-28 VITALS
HEART RATE: 60 BPM | BODY MASS INDEX: 30.06 KG/M2 | OXYGEN SATURATION: 98 % | SYSTOLIC BLOOD PRESSURE: 126 MMHG | WEIGHT: 210 LBS | HEIGHT: 70 IN | DIASTOLIC BLOOD PRESSURE: 60 MMHG

## 2024-02-28 DIAGNOSIS — I65.21 CAROTID OCCLUSION, RIGHT: ICD-10-CM

## 2024-02-28 DIAGNOSIS — I63.81 LEFT SIDED LACUNAR INFARCTION (HCC): ICD-10-CM

## 2024-02-28 DIAGNOSIS — I65.21 RIGHT INTERNAL CAROTID OCCLUSION: ICD-10-CM

## 2024-02-28 DIAGNOSIS — I65.22 ASYMPTOMATIC STENOSIS OF LEFT CAROTID ARTERY: Primary | ICD-10-CM

## 2024-02-28 DIAGNOSIS — I65.22 CAROTID STENOSIS, LEFT: ICD-10-CM

## 2024-02-28 PROCEDURE — 99214 OFFICE O/P EST MOD 30 MIN: CPT | Performed by: SURGERY

## 2024-02-28 NOTE — ASSESSMENT & PLAN NOTE
Hood presents to the office to review most recent surveillance carotid duplex.  Right ICA chronically occluded.  Left ICA less than 50% stenosis.  He denies any focal neurologic deficits.  Patient does have history of prior hemorrhagic left thalamic infarct.    Continue current medical regimen (plavix/statin)  Continue surveillance with duplex in December 2024.

## 2024-02-28 NOTE — PROGRESS NOTES
Assessment/Plan:    Right internal carotid occlusion  Known chronic right ICA occlusion.  No interventions indicated/warranted.      Carotid stenosis, left  Hood presents to the office to review most recent surveillance carotid duplex.  Right ICA chronically occluded.  Left ICA less than 50% stenosis.  He denies any focal neurologic deficits.  Patient does have history of prior hemorrhagic left thalamic infarct.    Continue current medical regimen (plavix/statin)  Continue surveillance with duplex in December 2024.       Diagnoses and all orders for this visit:    Asymptomatic stenosis of left carotid artery  -     VAS carotid complete study; Future    Carotid occlusion, right  -     VAS carotid complete study; Future    Right internal carotid occlusion    Carotid stenosis, left    Left sided lacunar infarction (HCC)          Subjective:      Patient ID: Hood Ojeda is a 78 y.o. male.    Patient is here today to review results of a CV done 12/12/2023. Patient denies any headaches, dizziness, sudden loss of vision, trouble swallowing, slurred speech or any other new TIA or Stroke symptoms. Patient has continued weakness on his right side. He is taking Plavix and Atorvastatin. Patient is a former smoker.     James presents to the office accompanied by his wife to review most recent surveillance carotid duplex.  He denies any new focal neurologic deficits.          The following portions of the patient's history were reviewed and updated as appropriate: allergies, current medications, past family history, past medical history, past social history, past surgical history, and problem list.    Review of Systems   Constitutional: Negative.    HENT: Negative.     Eyes: Negative.    Respiratory: Negative.     Cardiovascular: Negative.    Gastrointestinal: Negative.    Endocrine: Negative.    Genitourinary: Negative.    Musculoskeletal: Negative.    Skin: Negative.    Allergic/Immunologic: Negative.    Neurological:   "Positive for weakness.   Hematological: Negative.    Psychiatric/Behavioral: Negative.           Objective:      /60 (BP Location: Left arm, Patient Position: Sitting, Cuff Size: Large)   Pulse 60   Ht 5' 10\" (1.778 m)   Wt 95.3 kg (210 lb)   SpO2 98%   BMI 30.13 kg/m²          Physical Exam  Constitutional:       General: He is not in acute distress.     Appearance: He is well-developed.   HENT:      Head: Normocephalic and atraumatic.   Eyes:      General: No scleral icterus.     Conjunctiva/sclera: Conjunctivae normal.   Neck:      Trachea: No tracheal deviation.   Cardiovascular:      Rate and Rhythm: Normal rate and regular rhythm.      Heart sounds: Normal heart sounds.   Pulmonary:      Effort: Pulmonary effort is normal.      Breath sounds: Normal breath sounds.   Abdominal:      General: There is no distension.      Palpations: Abdomen is soft. There is no mass (no appreciable aortic pulsation/aneurysm).      Tenderness: There is no abdominal tenderness. There is no guarding or rebound.   Musculoskeletal:         General: Normal range of motion.      Cervical back: Normal range of motion and neck supple.   Skin:     General: Skin is warm and dry.   Neurological:      Mental Status: He is alert and oriented to person, place, and time.   Psychiatric:         Mood and Affect: Mood normal.         Behavior: Behavior normal.         Thought Content: Thought content normal.         Judgment: Judgment normal.             Carotid duplex:  CONCLUSION:     Impression  RIGHT:  There is known occlusion of the internal carotid artery.  Vertebral artery flow is antegrade. There is no significant subclavian artery  disease.     LEFT:  There is <50% stenosis noted in the internal carotid artery. Plaque is  homogenous and irregular.  There is minimal stenosis noted in the proximal external carotid artery.  Vertebral artery flow is antegrade. There is no significant subclavian artery  disease.  Incidental finding: " There is a complex, avascular focus in the thyroid lobe.     Compared to previous study on 12/7/22, there is no significant interval change  in the disease process.

## 2024-03-19 ENCOUNTER — OFFICE VISIT (OUTPATIENT)
Dept: FAMILY MEDICINE CLINIC | Facility: CLINIC | Age: 79
End: 2024-03-19
Payer: COMMERCIAL

## 2024-03-19 VITALS
BODY MASS INDEX: 29.63 KG/M2 | TEMPERATURE: 97 F | OXYGEN SATURATION: 99 % | DIASTOLIC BLOOD PRESSURE: 80 MMHG | WEIGHT: 207 LBS | SYSTOLIC BLOOD PRESSURE: 132 MMHG | HEIGHT: 70 IN | HEART RATE: 62 BPM

## 2024-03-19 DIAGNOSIS — Z00.00 MEDICARE ANNUAL WELLNESS VISIT, SUBSEQUENT: Primary | ICD-10-CM

## 2024-03-19 DIAGNOSIS — E55.9 VITAMIN D DEFICIENCY: ICD-10-CM

## 2024-03-19 DIAGNOSIS — G47.33 OBSTRUCTIVE SLEEP APNEA TREATED WITH CONTINUOUS POSITIVE AIRWAY PRESSURE (CPAP): ICD-10-CM

## 2024-03-19 DIAGNOSIS — I50.32 CHRONIC DIASTOLIC HEART FAILURE WITH PRESERVED EJECTION FRACTION (HCC): ICD-10-CM

## 2024-03-19 DIAGNOSIS — F33.9 DEPRESSION, RECURRENT (HCC): ICD-10-CM

## 2024-03-19 DIAGNOSIS — R73.03 PREDIABETES: ICD-10-CM

## 2024-03-19 DIAGNOSIS — F33.0 MILD RECURRENT MAJOR DEPRESSION (HCC): ICD-10-CM

## 2024-03-19 DIAGNOSIS — I77.9 BILATERAL CAROTID ARTERY DISEASE, UNSPECIFIED TYPE (HCC): ICD-10-CM

## 2024-03-19 DIAGNOSIS — I10 PRIMARY HYPERTENSION: ICD-10-CM

## 2024-03-19 DIAGNOSIS — E78.2 MIXED HYPERLIPIDEMIA: ICD-10-CM

## 2024-03-19 PROCEDURE — G0439 PPPS, SUBSEQ VISIT: HCPCS | Performed by: FAMILY MEDICINE

## 2024-03-19 NOTE — PROGRESS NOTES
Depression Screening Follow-up Plan: Patient's depression screening was positive with a PHQ-2 score of . Their PHQ-9 score was 6. Patient assessed for underlying major depression. They have no active suicidal ideations. Brief counseling provided and recommend additional follow-up/re-evaluation next office visit. Patient advised to follow-up with PCP for further management.

## 2024-03-19 NOTE — PROGRESS NOTES
Assessment and Plan:     Medicare annual wellness completed today.  Labs ordered as below.  We will recheck an HbA1c as patient does have a history of prediabetes.  Continue CPAP machine nightly for ARTURO.  Did screen positive for depression, states that it is mostly due to him not being able to do the things he was able to do in the past strength wise.  Continue physical therapy and we will continue to discuss this.  Follow-up with cardiology for history of CHF.  Follow-up with vascular surgery for history of bilateral carotid artery stenosis.  Blood pressure stable today 132/80, continue BP regimen.  RTC in 6 months for follow-up    Problem List Items Addressed This Visit     Primary hypertension    Mixed hyperlipidemia    Relevant Orders    Lipid panel    Prediabetes    Relevant Orders    Comprehensive metabolic panel    Hemoglobin A1C    Obstructive sleep apnea treated with continuous positive airway pressure (CPAP)    Depression, recurrent (HCC)    Chronic diastolic heart failure with preserved ejection fraction (HCC)    Bilateral carotid artery disease, unspecified type (HCC)   Other Visit Diagnoses     Medicare annual wellness visit, subsequent    -  Primary    Mild recurrent major depression (HCC)        Vitamin D deficiency        Relevant Orders    Vitamin D 25 hydroxy          Depression Screening and Follow-up Plan: Patient's depression screening was positive with a PHQ-9 score of 6. Clincally patient does not have depression. No treatment is required. Patient assessed for underlying major depression. Brief counseling provided and recommend additional follow-up/re-evaluation next office visit. Patient advised to follow-up with PCP for further management.       Preventive health issues were discussed with patient, and age appropriate screening tests were ordered as noted in patient's After Visit Summary.  Personalized health advice and appropriate referrals for health education or preventive services given  if needed, as noted in patient's After Visit Summary.     History of Present Illness:     Patient presents for a Medicare Wellness Visit    He presents today for Medicare annual wellness visit.  States overall he is doing well.  Compliant with his medications.  Follows up with vascular surgery as well as cardiology.       Patient Care Team:  Timmy Camacho MD as PCP - General (Family Medicine)  Raghu Lay DO (Vascular Surgery)     Review of Systems:     Review of Systems   Constitutional:  Negative for activity change, appetite change, chills, fatigue and fever.   HENT:  Negative for congestion, rhinorrhea, sneezing and sore throat.    Eyes:  Negative for pain, discharge, redness and itching.   Respiratory:  Negative for cough, chest tightness, shortness of breath and wheezing.    Cardiovascular:  Negative for chest pain and palpitations.   Gastrointestinal:  Negative for abdominal pain, constipation, diarrhea, nausea and vomiting.   Musculoskeletal:  Negative for arthralgias, gait problem, myalgias and neck pain.   Skin:  Negative for rash.   Neurological:  Negative for dizziness, weakness, numbness and headaches.   Hematological:  Negative for adenopathy.   Psychiatric/Behavioral:  Negative for confusion and suicidal ideas. The patient is not nervous/anxious.    All other systems reviewed and are negative.       Problem List:     Patient Active Problem List   Diagnosis   • History of prostate cancer   • Right internal carotid occlusion   • Premature atrial contraction   • Carotid stenosis, left   • DNR (do not resuscitate)   • Actinic keratosis   • BMI 28.0-28.9,adult   • thalamic hemorrhage history of lacunar stroke   • Primary hypertension   • Mixed hyperlipidemia   • Prediabetes   • Diastolic dysfunction   • Chronic rhinitis   • Obstructive sleep apnea treated with continuous positive airway pressure (CPAP)   • Depression, recurrent (HCC)   • Sensorineural hearing loss (SNHL) of both ears   • Sleep  behavior disorder, REM   • Sequela of lacunar infarction   • EKG, abnormal   • Bradycardia   • Difficulty using continuous positive airway pressure (CPAP) device   • Chronic diastolic heart failure with preserved ejection fraction (HCC)   • Hypertensive heart disease with chronic diastolic congestive heart failure (HCC)   • Long term current use of anticoagulant   • Bilateral carotid artery disease, unspecified type (HCC)      Past Medical and Surgical History:     Past Medical History:   Diagnosis Date   • Cancer (HCC)     prostate   • Carotid stenosis    • Hypertension    • Left sided lacunar infarction (HCC) 12/30/2020     Past Surgical History:   Procedure Laterality Date   • TONSILLECTOMY     • TRANSURETHRAL RESECTION OF PROSTATE        Family History:     Family History   Problem Relation Age of Onset   • Colon cancer Mother    • No Known Problems Father    • Leukemia Maternal Grandmother    • Diabetes Maternal Grandmother    • Alcohol abuse Maternal Grandfather    • No Known Problems Paternal Grandmother    • No Known Problems Paternal Grandfather       Social History:     Social History     Socioeconomic History   • Marital status: /Civil Union     Spouse name: None   • Number of children: None   • Years of education: None   • Highest education level: None   Occupational History   • None   Tobacco Use   • Smoking status: Former   • Smokeless tobacco: Never   Vaping Use   • Vaping status: Never Used   Substance and Sexual Activity   • Alcohol use: Yes     Alcohol/week: 4.0 standard drinks of alcohol     Types: 4 Cans of beer per week     Comment: several days a week   • Drug use: Never   • Sexual activity: None   Other Topics Concern   • None   Social History Narrative   • None     Social Determinants of Health     Financial Resource Strain: Not on file   Food Insecurity: Not on file   Transportation Needs: Not on file   Physical Activity: Not on file   Stress: Not on file   Social Connections: Not on  file   Intimate Partner Violence: Not on file   Housing Stability: Not on file      Medications and Allergies:     Current Outpatient Medications   Medication Sig Dispense Refill   • acetaminophen (TYLENOL) 325 mg tablet Take 650 mg by mouth every 6 (six) hours as needed for mild pain As needed     • amLODIPine (NORVASC) 10 mg tablet TAKE 1 TABLET BY MOUTH EVERY DAY 90 tablet 1   • atorvastatin (LIPITOR) 40 mg tablet TAKE 1 TABLET BY MOUTH EVERY DAY WITH DINNER 90 tablet 1   • clopidogrel (PLAVIX) 75 mg tablet TAKE 1 TABLET BY MOUTH EVERY DAY 90 tablet 1   • furosemide (LASIX) 20 mg tablet Take 1 tablet (20 mg total) by mouth every other day Take it in the morning. 45 tablet 3   • losartan (COZAAR) 25 mg tablet TAKE 1 TABLET (25 MG TOTAL) BY MOUTH DAILY. 90 tablet 1   • montelukast (SINGULAIR) 10 mg tablet TAKE 1 TABLET BY MOUTH DAILY AT BEDTIME 90 tablet 1   • spironolactone (ALDACTONE) 25 mg tablet Take 0.5 tablets (12.5 mg total) by mouth daily 45 tablet 3   • benzonatate (TESSALON) 200 MG capsule Take 1 capsule (200 mg total) by mouth 3 (three) times a day as needed for cough (Patient not taking: Reported on 7/21/2023) 20 capsule 0   • triamcinolone (KENALOG) 0.1 % cream Apply topically 2 (two) times a day (Patient not taking: Reported on 11/2/2023) 30 g 2     No current facility-administered medications for this visit.     Allergies   Allergen Reactions   • Ace Inhibitors Cough      Immunizations:     Immunization History   Administered Date(s) Administered   • COVID-19 MODERNA VACC 0.5 ML IM 01/20/2021, 02/15/2021, 11/23/2021   • INFLUENZA 10/31/2022   • Influenza, high dose seasonal 0.7 mL 10/25/2021, 10/31/2022, 09/19/2023   • Pneumococcal Conjugate Vaccine 20-valent (Pcv20), Polysace 04/29/2022   • Tdap 11/17/2019      Health Maintenance:         Topic Date Due   • Hepatitis C Screening  Completed         Topic Date Due   • COVID-19 Vaccine (4 - 2023-24 season) 09/01/2023      Medicare Screening Tests and  Risk Assessments:     Hood is here for his Subsequent Wellness visit. Last Medicare Wellness visit information reviewed, patient interviewed and updates made to the record today.      Health Risk Assessment:   Patient rates overall health as fair. Patient feels that their physical health rating is same. Patient is dissatisfied with their life. Eyesight was rated as same. Hearing was rated as same. Patient feels that their emotional and mental health rating is same. Patients states they are often angry. Patient states they are always unusually tired/fatigued. Pain experienced in the last 7 days has been none. Patient states that he has experienced no weight loss or gain in last 6 months.     Depression Screening:   PHQ-9 Score: 6      Fall Risk Screening:   In the past year, patient has experienced: no history of falling in past year      Home Safety:  Patient has trouble with stairs inside or outside of their home. Patient has working smoke alarms and has working carbon monoxide detector. Home safety hazards include: none.     Nutrition:   Current diet is Regular.     Medications:   Patient is currently taking over-the-counter supplements. OTC medications include: see medication list. Patient is able to manage medications.     Activities of Daily Living (ADLs)/Instrumental Activities of Daily Living (IADLs):   Walk and transfer into and out of bed and chair?: Yes  Dress and groom yourself?: Yes    Bathe or shower yourself?: Yes    Feed yourself? Yes  Do your laundry/housekeeping?: No  Manage your money, pay your bills and track your expenses?: Yes  Make your own meals?: No    Do your own shopping?: Yes    Previous Hospitalizations:   Any hospitalizations or ED visits within the last 12 months?: No      Advance Care Planning:   Living will: No    Durable POA for healthcare: Yes    Advanced directive: No      PREVENTIVE SCREENINGS      Cardiovascular Screening:    General: Screening Not Indicated, History Lipid  "Disorder and Risks and Benefits Discussed    Due for: Lipid Panel      Diabetes Screening:     General: Risks and Benefits Discussed    Due for: Blood Glucose      Colorectal Cancer Screening:     General: Screening Not Indicated      Prostate Cancer Screening:    General: History Prostate Cancer and Screening Not Indicated      Osteoporosis Screening:    General: Screening Not Indicated      Abdominal Aortic Aneurysm (AAA) Screening:    Risk factors include: tobacco use        General: Screening Not Indicated      Lung Cancer Screening:     General: Screening Not Indicated      Hepatitis C Screening:    General: Screening Current    Screening, Brief Intervention, and Referral to Treatment (SBIRT)    Screening      Single Item Drug Screening:  How often have you used an illegal drug (including marijuana) or a prescription medication for non-medical reasons in the past year? never    Single Item Drug Screen Score: 0  Interpretation: Negative screen for possible drug use disorder    No results found.     Physical Exam:     /80   Pulse 62   Temp (!) 97 °F (36.1 °C)   Ht 5' 10\" (1.778 m)   Wt 93.9 kg (207 lb)   SpO2 99%   BMI 29.70 kg/m²     Physical Exam  Vitals reviewed.   Constitutional:       General: He is not in acute distress.     Appearance: Normal appearance. He is well-developed and normal weight. He is not ill-appearing or toxic-appearing.   HENT:      Head: Normocephalic and atraumatic.      Right Ear: Tympanic membrane, ear canal and external ear normal. There is no impacted cerumen.      Left Ear: Tympanic membrane, ear canal and external ear normal. There is no impacted cerumen.      Nose: Nose normal. No congestion or rhinorrhea.      Mouth/Throat:      Mouth: Mucous membranes are moist.      Pharynx: Oropharynx is clear. No oropharyngeal exudate or posterior oropharyngeal erythema.   Eyes:      General: No scleral icterus.        Right eye: No discharge.         Left eye: No discharge.      " Conjunctiva/sclera: Conjunctivae normal.      Pupils: Pupils are equal, round, and reactive to light.   Cardiovascular:      Rate and Rhythm: Normal rate and regular rhythm.      Pulses: Normal pulses.      Heart sounds: Normal heart sounds. No murmur heard.  Pulmonary:      Effort: Pulmonary effort is normal. No respiratory distress.      Breath sounds: Normal breath sounds.   Abdominal:      General: Abdomen is flat. There is no distension.      Palpations: Abdomen is soft. There is no mass.      Tenderness: There is no abdominal tenderness.      Hernia: No hernia is present.   Musculoskeletal:         General: No tenderness. Normal range of motion.      Cervical back: Normal range of motion.   Skin:     General: Skin is warm and dry.      Capillary Refill: Capillary refill takes less than 2 seconds.      Findings: No rash.   Neurological:      General: No focal deficit present.      Mental Status: He is alert.      Motor: No weakness.   Psychiatric:         Mood and Affect: Mood normal.         Behavior: Behavior normal.          Timmy Camacho MD

## 2024-03-19 NOTE — PATIENT INSTRUCTIONS
Medicare Preventive Visit Patient Instructions  Thank you for completing your Welcome to Medicare Visit or Medicare Annual Wellness Visit today. Your next wellness visit will be due in one year (3/20/2025).  The screening/preventive services that you may require over the next 5-10 years are detailed below. Some tests may not apply to you based off risk factors and/or age. Screening tests ordered at today's visit but not completed yet may show as past due. Also, please note that scanned in results may not display below.  Preventive Screenings:  Service Recommendations Previous Testing/Comments   Colorectal Cancer Screening  Colonoscopy    Fecal Occult Blood Test (FOBT)/Fecal Immunochemical Test (FIT)  Fecal DNA/Cologuard Test  Flexible Sigmoidoscopy Age: 45-75 years old   Colonoscopy: every 10 years (May be performed more frequently if at higher risk)  OR  FOBT/FIT: every 1 year  OR  Cologuard: every 3 years  OR  Sigmoidoscopy: every 5 years  Screening may be recommended earlier than age 45 if at higher risk for colorectal cancer. Also, an individualized decision between you and your healthcare provider will decide whether screening between the ages of 76-85 would be appropriate. Colonoscopy: Not on file  FOBT/FIT: Not on file  Cologuard: Not on file  Sigmoidoscopy: Not on file          Prostate Cancer Screening Individualized decision between patient and health care provider in men between ages of 55-69   Medicare will cover every 12 months beginning on the day after your 50th birthday PSA: 0.2 ng/mL     History Prostate Cancer  Screening Not Indicated     Hepatitis C Screening Once for adults born between 1945 and 1965  More frequently in patients at high risk for Hepatitis C Hep C Antibody: 07/28/2022    Screening Current   Diabetes Screening 1-2 times per year if you're at risk for diabetes or have pre-diabetes Fasting glucose: 86 mg/dL (9/19/2023)  A1C: 6.2 % (9/19/2023)  Screening Current   Cholesterol  Screening Once every 5 years if you don't have a lipid disorder. May order more often based on risk factors. Lipid panel: 09/19/2023  Screening Not Indicated  History Lipid Disorder      Other Preventive Screenings Covered by Medicare:  Abdominal Aortic Aneurysm (AAA) Screening: covered once if your at risk. You're considered to be at risk if you have a family history of AAA or a male between the age of 65-75 who smoking at least 100 cigarettes in your lifetime.  Lung Cancer Screening: covers low dose CT scan once per year if you meet all of the following conditions: (1) Age 55-77; (2) No signs or symptoms of lung cancer; (3) Current smoker or have quit smoking within the last 15 years; (4) You have a tobacco smoking history of at least 20 pack years (packs per day x number of years you smoked); (5) You get a written order from a healthcare provider.  Glaucoma Screening: covered annually if you're considered high risk: (1) You have diabetes OR (2) Family history of glaucoma OR (3)  aged 50 and older OR (4)  American aged 65 and older  Osteoporosis Screening: covered every 2 years if you meet one of the following conditions: (1) Have a vertebral abnormality; (2) On glucocorticoid therapy for more than 3 months; (3) Have primary hyperparathyroidism; (4) On osteoporosis medications and need to assess response to drug therapy.  HIV Screening: covered annually if you're between the age of 15-65. Also covered annually if you are younger than 15 and older than 65 with risk factors for HIV infection. For pregnant patients, it is covered up to 3 times per pregnancy.    Immunizations:  Immunization Recommendations   Influenza Vaccine Annual influenza vaccination during flu season is recommended for all persons aged >= 6 months who do not have contraindications   Pneumococcal Vaccine   * Pneumococcal conjugate vaccine = PCV13 (Prevnar 13), PCV15 (Vaxneuvance), PCV20 (Prevnar 20)  * Pneumococcal  polysaccharide vaccine = PPSV23 (Pneumovax) Adults 19-65 yo with certain risk factors or if 65+ yo  If never received any pneumonia vaccine: recommend Prevnar 20 (PCV20)  Give PCV20 if previously received 1 dose of PCV13 or PPSV23   Hepatitis B Vaccine 3 dose series if at intermediate or high risk (ex: diabetes, end stage renal disease, liver disease)   Respiratory syncytial virus (RSV) Vaccine - COVERED BY MEDICARE PART D  * RSVPreF3 (Arexvy) CDC recommends that adults 60 years of age and older may receive a single dose of RSV vaccine using shared clinical decision-making (SCDM)   Tetanus (Td) Vaccine - COST NOT COVERED BY MEDICARE PART B Following completion of primary series, a booster dose should be given every 10 years to maintain immunity against tetanus. Td may also be given as tetanus wound prophylaxis.   Tdap Vaccine - COST NOT COVERED BY MEDICARE PART B Recommended at least once for all adults. For pregnant patients, recommended with each pregnancy.   Shingles Vaccine (Shingrix) - COST NOT COVERED BY MEDICARE PART B  2 shot series recommended in those 19 years and older who have or will have weakened immune systems or those 50 years and older     Health Maintenance Due:      Topic Date Due   • Hepatitis C Screening  Completed     Immunizations Due:      Topic Date Due   • COVID-19 Vaccine (4 - 2023-24 season) 09/01/2023     Advance Directives   What are advance directives?  Advance directives are legal documents that state your wishes and plans for medical care. These plans are made ahead of time in case you lose your ability to make decisions for yourself. Advance directives can apply to any medical decision, such as the treatments you want, and if you want to donate organs.   What are the types of advance directives?  There are many types of advance directives, and each state has rules about how to use them. You may choose a combination of any of the following:  Living will:  This is a written record of  the treatment you want. You can also choose which treatments you do not want, which to limit, and which to stop at a certain time. This includes surgery, medicine, IV fluid, and tube feedings.   Durable power of  for healthcare (DPAHC):  This is a written record that states who you want to make healthcare choices for you when you are unable to make them for yourself. This person, called a proxy, is usually a family member or a friend. You may choose more than 1 proxy.  Do not resuscitate (DNR) order:  A DNR order is used in case your heart stops beating or you stop breathing. It is a request not to have certain forms of treatment, such as CPR. A DNR order may be included in other types of advance directives.  Medical directive:  This covers the care that you want if you are in a coma, near death, or unable to make decisions for yourself. You can list the treatments you want for each condition. Treatment may include pain medicine, surgery, blood transfusions, dialysis, IV or tube feedings, and a ventilator (breathing machine).  Values history:  This document has questions about your views, beliefs, and how you feel and think about life. This information can help others choose the care that you would choose.  Why are advance directives important?  An advance directive helps you control your care. Although spoken wishes may be used, it is better to have your wishes written down. Spoken wishes can be misunderstood, or not followed. Treatments may be given even if you do not want them. An advance directive may make it easier for your family to make difficult choices about your care.   Weight Management   Why it is important to manage your weight:  Being overweight increases your risk of health conditions such as heart disease, high blood pressure, type 2 diabetes, and certain types of cancer. It can also increase your risk for osteoarthritis, sleep apnea, and other respiratory problems. Aim for a slow, steady  weight loss. Even a small amount of weight loss can lower your risk of health problems.  How to lose weight safely:  A safe and healthy way to lose weight is to eat fewer calories and get regular exercise. You can lose up about 1 pound a week by decreasing the number of calories you eat by 500 calories each day.   Healthy meal plan for weight management:  A healthy meal plan includes a variety of foods, contains fewer calories, and helps you stay healthy. A healthy meal plan includes the following:  Eat whole-grain foods more often.  A healthy meal plan should contain fiber. Fiber is the part of grains, fruits, and vegetables that is not broken down by your body. Whole-grain foods are healthy and provide extra fiber in your diet. Some examples of whole-grain foods are whole-wheat breads and pastas, oatmeal, brown rice, and bulgur.  Eat a variety of vegetables every day.  Include dark, leafy greens such as spinach, kale, jayne greens, and mustard greens. Eat yellow and orange vegetables such as carrots, sweet potatoes, and winter squash.   Eat a variety of fruits every day.  Choose fresh or canned fruit (canned in its own juice or light syrup) instead of juice. Fruit juice has very little or no fiber.  Eat low-fat dairy foods.  Drink fat-free (skim) milk or 1% milk. Eat fat-free yogurt and low-fat cottage cheese. Try low-fat cheeses such as mozzarella and other reduced-fat cheeses.  Choose meat and other protein foods that are low in fat.  Choose beans or other legumes such as split peas or lentils. Choose fish, skinless poultry (chicken or turkey), or lean cuts of red meat (beef or pork). Before you cook meat or poultry, cut off any visible fat.   Use less fat and oil.  Try baking foods instead of frying them. Add less fat, such as margarine, sour cream, regular salad dressing and mayonnaise to foods. Eat fewer high-fat foods. Some examples of high-fat foods include french fries, doughnuts, ice cream, and  cakes.  Eat fewer sweets.  Limit foods and drinks that are high in sugar. This includes candy, cookies, regular soda, and sweetened drinks.  Exercise:  Exercise at least 30 minutes per day on most days of the week. Some examples of exercise include walking, biking, dancing, and swimming. You can also fit in more physical activity by taking the stairs instead of the elevator or parking farther away from stores. Ask your healthcare provider about the best exercise plan for you.      © Copyright [employer] 2018 Information is for End User's use only and may not be sold, redistributed or otherwise used for commercial purposes. All illustrations and images included in CareNotes® are the copyrighted property of [employer]. or [employer]    Depression   AMBULATORY CARE:   Depression  is a mood disorder that causes feelings of sadness or hopelessness that do not go away. Depression may cause you to lose interest in things you used to enjoy. These feelings may interfere with your daily life.  Common signs and symptoms:   Appetite changes, or weight gain or loss    Trouble falling or staying asleep, or sleeping too much    Fatigue (being mentally and physically tired) or lack of energy    Feeling restless, irritable, or withdrawn    Feeling worthless, hopeless, discouraged, or guilty    Trouble concentrating, remembering things, doing daily tasks, or making decisions    Thoughts about hurting or killing yourself    Call your local emergency number (911 in the [place]) if:   You think about hurting yourself or someone else.    You have done something on purpose to hurt yourself.    Call your therapist or doctor if:   Your symptoms get worse or do not get better with treatment.    Your depression keeps you from doing your regular daily activities.    You have new symptoms since your last visit.    You have questions or concerns about your condition or care.    The following resources are available at any time to help  you, if needed:   Contact a suicide prevention organization:        For the 988 Suicide and Crisis Lifeline:     Call or text 988     Send a chat on https://Loylap.org/chat     Call 4-357-541-8114 (1-800-273-TALK)    For the Suicide Hotline, call 8-157-477-7340 (1-505-XPWKUHF)    For a list of international numbers: https://save.org/find-help/international-resources/  Treatment for depression  depends on how severe your symptoms are. You may need any of the following:  Cognitive behavioral therapy (CBT)  teaches you how to identify and change negative thought patterns.    Antidepressant medicine  may be given to decrease or manage symptoms. You may need to take this medicine for several weeks before they start working.    Self-care:   Talk to someone about your depression.  Your healthcare provider may suggest counseling. You might feel more comfortable talking with a friend or family member about your depression. Choose someone you know will be supportive and encouraging.    Get regular physical activity.  Physical activity can lower your stress, improve your mood, and help you sleep better. Work with your healthcare provider to develop a plan that you enjoy.         Create a regular sleep schedule.  A routine can help you relax before bed. Listen to music, read, or do yoga. Try to go to bed and wake up at the same time every day. Sleep is important for emotional health.    Eat a variety of healthy foods.  Healthy foods include fruits, vegetables, whole-grain breads, low-fat dairy products, lean meats, fish, and cooked beans. A healthy meal plan is low in fat, salt, and added sugar.         Do not use alcohol, drugs, or nicotine products.  These can worsen depression or make it hard to manage. Talk to your therapist or healthcare provider if you use any of these products and need help to quit.    Follow up with your therapist or doctor as directed:  Your healthcare provider will monitor your progress at  follow-up visits. Your provider will also monitor your medicine if you take antidepressants and ask if the medicine is helping. Tell your provider about any side effects or problems you have with your medicine. The type or amount of medicine may need to be changed. Write down your questions so you remember to ask them during your visits.  For more information or support:   [employer]  [address]  [place] [address]  Phone: 7- 663 - 958-4356  Phone: 0- 243 - 879-5494  Web Address: http://www.lavern.[employer]  988 Suicide and Crisis Lifeline  [address]  [place] [address]  Phone: 6- 157 - 701  Web Address: http://www.suicidepreventionlifeline.org OR https://[employer] [place]/chat/    © Copyright [employer] 2023 Information is for End User's use only and may not be sold, redistributed or otherwise used for commercial purposes.  The above information is an  only. It is not intended as medical advice for individual conditions or treatments. Talk to your doctor, nurse or pharmacist before following any medical regimen to see if it is safe and effective for you.

## 2024-03-29 DIAGNOSIS — I10 PRIMARY HYPERTENSION: ICD-10-CM

## 2024-03-29 RX ORDER — LOSARTAN POTASSIUM 25 MG/1
25 TABLET ORAL DAILY
Qty: 90 TABLET | Refills: 1 | Status: SHIPPED | OUTPATIENT
Start: 2024-03-29

## 2024-04-20 DIAGNOSIS — I10 HYPERTENSION: ICD-10-CM

## 2024-04-20 DIAGNOSIS — I61.9 HEMORRHAGIC STROKE (HCC): ICD-10-CM

## 2024-04-20 RX ORDER — AMLODIPINE BESYLATE 10 MG/1
TABLET ORAL
Qty: 90 TABLET | Refills: 1 | Status: SHIPPED | OUTPATIENT
Start: 2024-04-20

## 2024-04-24 ENCOUNTER — OFFICE VISIT (OUTPATIENT)
Dept: SLEEP CENTER | Facility: CLINIC | Age: 79
End: 2024-04-24
Payer: COMMERCIAL

## 2024-04-24 VITALS
HEIGHT: 70 IN | BODY MASS INDEX: 30.06 KG/M2 | SYSTOLIC BLOOD PRESSURE: 122 MMHG | WEIGHT: 210 LBS | DIASTOLIC BLOOD PRESSURE: 60 MMHG

## 2024-04-24 DIAGNOSIS — R06.81 CENTRAL APNEA: ICD-10-CM

## 2024-04-24 DIAGNOSIS — I10 PRIMARY HYPERTENSION: ICD-10-CM

## 2024-04-24 DIAGNOSIS — G47.33 OBSTRUCTIVE SLEEP APNEA TREATED WITH CONTINUOUS POSITIVE AIRWAY PRESSURE (CPAP): Primary | ICD-10-CM

## 2024-04-24 DIAGNOSIS — Z78.9 DIFFICULTY USING CONTINUOUS POSITIVE AIRWAY PRESSURE (CPAP) DEVICE: ICD-10-CM

## 2024-04-24 DIAGNOSIS — I69.30 SEQUELA OF LACUNAR INFARCTION: ICD-10-CM

## 2024-04-24 PROCEDURE — G2211 COMPLEX E/M VISIT ADD ON: HCPCS | Performed by: STUDENT IN AN ORGANIZED HEALTH CARE EDUCATION/TRAINING PROGRAM

## 2024-04-24 PROCEDURE — 99213 OFFICE O/P EST LOW 20 MIN: CPT | Performed by: STUDENT IN AN ORGANIZED HEALTH CARE EDUCATION/TRAINING PROGRAM

## 2024-04-24 NOTE — PROGRESS NOTES
[facility]  Sleep Medicine Follow up/ Established Patient Visit      Assessment/Plan:    1. Obstructive sleep apnea treated with continuous positive airway pressure (CPAP)  Mask fitting only    PAP DME Resupply/Reorder    CPAP Study      2. Primary hypertension  Mask fitting only    PAP DME Resupply/Reorder    CPAP Study      3. Sequela of lacunar infarction  Mask fitting only    PAP DME Resupply/Reorder    CPAP Study      4. Difficulty using continuous positive airway pressure (CPAP) device  Mask fitting only    PAP DME Resupply/Reorder    CPAP Study      5. Central apnea  Mask fitting only    PAP DME Resupply/Reorder    CPAP Study          Will order for mask fitting given complaints with current CPAP mask.  Obtain CPAP titration study to determine appropriate pressure settings given his elevated AHI while on CPAP.   Will need to follow-up on CPAP study and determine if elevation in AHI could be secondary to central apneas. If so, patient would benefit from devices to treat both obstructive and central apneas such as adaptive servo-ventilation (ASV).  Ordered for resupply of DME.  Recommend neurology appointment or visit to PT to address complaints with unilateral left-sided numbness and tingling in the digits of the upper extremity. Symptom could be related to prior lacunar infarct or cervical radiculopathy. Lacunar infarct as the etiology would be strange given the left-sided location of the stroke.  He will Return in about 6 months (around 10/24/2024).      ________________________________________________________________________________________________    Per Last Visit Note (Date: 7/21/2023):  Presented for mild obstructive sleep apnea with symptoms of excessive daytime sleepiness and restlessness. He had a home sleep study in August 2022 with ADARSH of 10.6 and oxygen mariia of 87%.       [facility] TRT: 479 minutes, ADARSH: 10.6, O2 mariia: 87%      ________________________________________________________________________________________________      Interval History: Hood Ojeda is a 78 y.o. male with a PMHx of who presents in follow up for obstructive sleep apnea (AHI of 6.9 and oxygen mariia of 87% per home sleep study). He states he gets approximately eight hours of sleep per night and falls asleep immediately. His spouse has noticed the patient will snore more loudly even while on CPAP at night. His only complaint with the mask is the persistent redness and soreness with each use.    SDB:  -Current experience with PAP Therapy: Beneficial with CPAP, reports he snores more when not using CPAP  -Mask type: Full face mask  -Difficulties with mask: Nose is persistently shore and erythematous after use, skin never breaks, no reported air leak  -Device: Smita G3  -Difficulties with device: None  -Compliance:        [place] Sleepiness Scale:  What are your chances of dozing?   0= no chance  1= slight chance  2= moderate chance  3= high chance    Sitting and reading: 3  Watching TV: 3  Sitting, inactive in a public place (e.g. a theatre or a meeting): 0  As a passenger in a car for an hour without a break: 0  Lying down to rest in the afternoon when circumstances permit: 3   Sitting and talking to someone: 0  Sitting quietly after a lunch without alcohol: 2  In a car, while stopped for a few minutes in the traffic: 0       TOTAL  11/24  Greater or equal to 10 is positive for excessive daytime sleepiness        SLEEP HYGIENE QUESTIONS:  Bedtime: 8 PM  Time it takes to fall sleep: Immediately  Wake up Time: 5 AM  Number of times patient wakes up per night: 0  Reason (s) why patient wakes up during the night: N/A  Estimated total sleep time ( in a 24 hour period of time): 8 hours  Naps: Would take daytime naps given the opportunity    Changes to PMH, PSH, SH: None    SLEEP RELATED ROS  Review of Systems   Constitutional:  Negative for chills and fever.   HENT:   "Negative for ear pain and sore throat.    Eyes:  Negative for pain and visual disturbance.   Respiratory:  Negative for cough and shortness of breath.    Cardiovascular:  Negative for chest pain and palpitations.   Gastrointestinal:  Negative for abdominal pain and vomiting.   Genitourinary:  Negative for dysuria and hematuria.   Musculoskeletal:  Negative for arthralgias and back pain.   Skin:  Negative for color change and rash.   Neurological:  Negative for seizures and syncope.   All other systems reviewed and are negative.    Allergies   Allergen Reactions    Ace Inhibitors Cough       CURRENT MEDICATIONS:  Current Outpatient Medications   Medication Instructions    acetaminophen (TYLENOL) 650 mg, Oral, Every 6 hours PRN, As needed     amLODIPine (NORVASC) 10 mg tablet TAKE 1 TABLET BY MOUTH EVERY DAY    atorvastatin (LIPITOR) 40 mg tablet TAKE 1 TABLET BY MOUTH EVERY DAY WITH DINNER    benzonatate (TESSALON) 200 mg, Oral, 3 times daily PRN    clopidogrel (PLAVIX) 75 mg tablet TAKE 1 TABLET BY MOUTH EVERY DAY    furosemide (LASIX) 20 mg, Oral, Every other day, Take it in the morning.    losartan (COZAAR) 25 mg, Oral, Daily    montelukast (SINGULAIR) 10 mg, Oral, Daily at bedtime    spironolactone (ALDACTONE) 12.5 mg, Oral, Daily    triamcinolone (KENALOG) 0.1 % cream Topical, 2 times daily           PHYSICAL EXAMINATION:  Vital Signs: /60   Ht 5' 10\" (1.778 m)   Wt 95.3 kg (210 lb)   BMI 30.13 kg/m²     Constitutional: NAD, well appearing   Mental Status: AAOx3  Skin: Warm, dry, no rashes noted   Eyes: PERRL, normal conjunctiva  ENT: Nasal congestion absent, nasal valve incompetence absent.  Posterior Airspace:   Jones Tongue Position: 2  Retrognathia: absent  Overbite: absent  High Arched Palate: absent  Tongue Scalloping/Ridging: absent  Uvula: normal  Chest: RRR, +S1/S2, CTA B/L, no W/R/R  Abdomen: Soft, NT/ND  Extremities: No digital clubbing or pedal edema  Neuro: Strength 5/5 throughout, " sensation grossly intact      I have spent a total time of 60 minutes on 04/24/24 in caring for this patient including Diagnostic results, Prognosis, Risks and benefits of tx options, Instructions for management, Patient and family education, Importance of tx compliance, Risk factor reductions, Impressions, Counseling / Coordination of care, Documenting in the medical record, Reviewing / ordering tests, medicine, procedures  , Obtaining or reviewing history  , and Communicating with other healthcare professionals .        Electronically signed by:    Alhaji Galo DO  [facility]  04/24/24

## 2024-04-24 NOTE — PATIENT INSTRUCTIONS
Continue PAP Therapy  Continue AutoPAP at settings of 9-11.5 cmH2O  Remember to clean your mask and equipment regularly, as directed.  Discuss the possibility of neck imaging, an EMG/NCS study, or even simply a neurology evaluation regarding the numbness in your arms and hands with your PCP and/or PT physician.   I am ordering a formal mask fitting appointment; this is an appointment with the DME to ensure that you have the optimal mask and fit for your face structure    This is an in-lab sleep study utilizing CPAP, where we slowly adjust the pressure in order to determine the optimal settings to treat your sleep apnea.   You should be eligible for new supplies approximately every 3-6 months, depending on your insurance coverage. Contact your Durable Medical Equipment (DME) company for new supplies as needed.  Follow up in 6 months.      Care and Maintenance  Headgear should be washed as needed. Daily inspection and weekly washings are recommended. Do not disassemble the straps. Machine wash in warm water, making sure to attach Velcro hooks and tabs before washing. Line dry or machine dry on a low setting.  Masks should be washed every day. Daily inspection is recommended. Leave the mask and tubing attached. Gently wash the mask with a soft cloth using warm water and mild detergent, concentrating on the mask cushion flaps. DO NOT use alcohol or bleach. Rinse thoroughly and air dry.  Tubing and headgear should be washed weekly. Daily inspection is recommended. Wash in warm water and mild detergent and rinse thoroughly. Hook the tubing to the machine and blow until dry.  Humidifier should be washed daily and filled with DISTILLED water before use. Wash with warm water and mild detergent. Disinfect weekly by soaking with a solution of 1 part white vinegar and 3 parts water for 30 minutes. Rinse thoroughly and air dry.  Disposable filters should be replaced once a month. Wash reusable foam filters with warm water and  mild detergent at least once a month. Rinse thoroughly and dry with paper towels.  Avoid  that contain fragrance or conditioners, as these will leave a residue.  NEVER iron any soft goods.      CMS Requirements    Your insurance requires a face-to-face follow up visit within a 31-90 day period after starting CPAP.  Your insurance requires compliance with CPAP, which is at least 4 hours per night for 70% of the time. This must be done over a 30 day period and must occur within the initial 31-90 day period after starting CPAP.  Your insurance also requires at least yearly follow ups to continue to pay for CPAP supplies.       PAP Supply Guidelines    Below are the guidelines for reordering your supplies. You will be responsible for your deductible, co payments, and out of pocket expenses.    Item Frequency   Nasal Mask (no headgear) 1 every 3 months   Nasal Mask Cushion 1 every 2 weeks   Full Face Mask (no headgear) 1 every 3 months   Full Face Mask Cushion 1 every month   Nasal Pillows 1 every 2 weeks   Headgear 1 every 6 months   hin Strap 1 every 6 months   jessee 1 every 3 months   Filters: Reusable 1 every 6 months   Filters: Disposable 1 every 2 weeks   Humidifier Chamber(disposable) 1 every 6 months         Good Sleep Hygiene    Wake up at the same time every day, even on the weekends.  Use your bed for sleep and intimacy only.  If you have been in bed awake for 30 minutes, get up and leave the bedroom. Choose a dull activity not involving a blue screen (TV, computer, handheld devices). Go back to bed when you feel sleepy.  Avoid caffeine, nicotine and alcohol before you go to bed.  Avoid large meals before you go to bed.  Avoid using screens (computers, tablets, smartphones, etc.) for at least 1 hour before bedtime  Exercise regularly, but do not exercise right before you go to bed.  Avoid daytime naps. If you do take a nap, sleep for 20-40 minutes, and not after dinner.

## 2024-04-25 ENCOUNTER — TELEPHONE (OUTPATIENT)
Dept: SLEEP CENTER | Facility: CLINIC | Age: 79
End: 2024-04-25

## 2024-04-25 LAB
DME PARACHUTE DELIVERY DATE REQUESTED: NORMAL
DME PARACHUTE ITEM DESCRIPTION: NORMAL
DME PARACHUTE ORDER STATUS: NORMAL
DME PARACHUTE SUPPLIER NAME: NORMAL
DME PARACHUTE SUPPLIER PHONE: NORMAL

## 2024-04-29 ENCOUNTER — HOSPITAL ENCOUNTER (OUTPATIENT)
Dept: SLEEP CENTER | Facility: CLINIC | Age: 79
Discharge: HOME | End: 2024-04-29
Payer: COMMERCIAL

## 2024-04-29 DIAGNOSIS — I10 PRIMARY HYPERTENSION: ICD-10-CM

## 2024-04-29 DIAGNOSIS — Z78.9 DIFFICULTY USING CONTINUOUS POSITIVE AIRWAY PRESSURE (CPAP) DEVICE: ICD-10-CM

## 2024-04-29 DIAGNOSIS — G47.33 OBSTRUCTIVE SLEEP APNEA TREATED WITH CONTINUOUS POSITIVE AIRWAY PRESSURE (CPAP): ICD-10-CM

## 2024-04-29 DIAGNOSIS — R06.81 CENTRAL APNEA: ICD-10-CM

## 2024-04-29 DIAGNOSIS — I69.30 SEQUELA OF LACUNAR INFARCTION: ICD-10-CM

## 2024-04-29 LAB

## 2024-04-29 PROCEDURE — 95811 POLYSOM 6/>YRS CPAP 4/> PARM: CPT

## 2024-04-30 PROBLEM — G47.33 OSA (OBSTRUCTIVE SLEEP APNEA): Status: ACTIVE | Noted: 2024-04-30

## 2024-04-30 NOTE — PROGRESS NOTES
Sleep Study Documentation    Pre-Sleep Study       Sleep testing procedure explained to patient:YES    Patient napped prior to study:NO    Caffeine:Dayshift worker after 12PM.  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 Mean SaO2: 95.2%  Mode of Therapy:CPAP  ETCO2:No  CPAP changed to BiPAP:No    EKG abnormalities: no     EEG abnormalities: no    Were abnormal behaviors in sleep observed:NO    Is Total Sleep Study Recording Time < 2 hours: N/A    Is Total Sleep Study Recording Time > 2 hours but study is incomplete: N/A    Is Total Sleep Study Recording Time 6 hours or more but sleep was not obtained: NO    Patient classification: retired       Post-Sleep Study    Medication used at bedtime or during sleep study:NO    Patient reports time it took to fall asleep:less than 20 minutes    Patient reports waking up during study:1 to 2 times.  Patient reports returning to sleep without difficulty.    Patient reports sleeping 6 to 8 hours without dreaming.    Does the Patient feel this is a typical night of sleep:typical    Patient rated sleepiness: Not sleepy or tired    PAP treatment:yes: Post PAP treatment patient reports feeling unchanged and  would wear PAP mask at home.

## 2024-05-06 ENCOUNTER — APPOINTMENT (OUTPATIENT)
Dept: LAB | Facility: CLINIC | Age: 79
End: 2024-05-06
Payer: COMMERCIAL

## 2024-05-06 DIAGNOSIS — E55.9 VITAMIN D DEFICIENCY: ICD-10-CM

## 2024-05-06 DIAGNOSIS — R73.03 PREDIABETES: ICD-10-CM

## 2024-05-06 DIAGNOSIS — E78.2 MIXED HYPERLIPIDEMIA: ICD-10-CM

## 2024-05-06 LAB
25(OH)D3 SERPL-MCNC: 31.8 NG/ML (ref 30–100)
ALBUMIN SERPL BCP-MCNC: 4.3 G/DL (ref 3.5–5)
ALP SERPL-CCNC: 83 U/L (ref 34–104)
ALT SERPL W P-5'-P-CCNC: 16 U/L (ref 7–52)
ANION GAP SERPL CALCULATED.3IONS-SCNC: 9 MMOL/L (ref 4–13)
AST SERPL W P-5'-P-CCNC: 13 U/L (ref 13–39)
BILIRUB SERPL-MCNC: 0.68 MG/DL (ref 0.2–1)
BUN SERPL-MCNC: 16 MG/DL (ref 5–25)
CALCIUM SERPL-MCNC: 9.2 MG/DL (ref 8.4–10.2)
CHLORIDE SERPL-SCNC: 105 MMOL/L (ref 96–108)
CHOLEST SERPL-MCNC: 108 MG/DL
CO2 SERPL-SCNC: 27 MMOL/L (ref 21–32)
CREAT SERPL-MCNC: 0.8 MG/DL (ref 0.6–1.3)
EST. AVERAGE GLUCOSE BLD GHB EST-MCNC: 128 MG/DL
GFR SERPL CREATININE-BSD FRML MDRD: 84 ML/MIN/1.73SQ M
GLUCOSE P FAST SERPL-MCNC: 97 MG/DL (ref 65–99)
HBA1C MFR BLD: 6.1 %
HDLC SERPL-MCNC: 47 MG/DL
LDLC SERPL CALC-MCNC: 42 MG/DL (ref 0–100)
NONHDLC SERPL-MCNC: 61 MG/DL
POTASSIUM SERPL-SCNC: 4.1 MMOL/L (ref 3.5–5.3)
PROT SERPL-MCNC: 6.9 G/DL (ref 6.4–8.4)
SODIUM SERPL-SCNC: 141 MMOL/L (ref 135–147)
TRIGL SERPL-MCNC: 93 MG/DL

## 2024-05-06 PROCEDURE — 82306 VITAMIN D 25 HYDROXY: CPT

## 2024-05-06 PROCEDURE — 83036 HEMOGLOBIN GLYCOSYLATED A1C: CPT

## 2024-05-06 PROCEDURE — 80061 LIPID PANEL: CPT

## 2024-05-06 PROCEDURE — 80053 COMPREHEN METABOLIC PANEL: CPT

## 2024-05-06 PROCEDURE — 36415 COLL VENOUS BLD VENIPUNCTURE: CPT

## 2024-05-07 ENCOUNTER — OFFICE VISIT (OUTPATIENT)
Dept: FAMILY MEDICINE CLINIC | Facility: CLINIC | Age: 79
End: 2024-05-07
Payer: COMMERCIAL

## 2024-05-07 VITALS
TEMPERATURE: 98.1 F | SYSTOLIC BLOOD PRESSURE: 124 MMHG | HEART RATE: 59 BPM | HEIGHT: 70 IN | BODY MASS INDEX: 30.09 KG/M2 | DIASTOLIC BLOOD PRESSURE: 70 MMHG | OXYGEN SATURATION: 96 % | WEIGHT: 210.2 LBS

## 2024-05-07 DIAGNOSIS — E78.2 MIXED HYPERLIPIDEMIA: ICD-10-CM

## 2024-05-07 DIAGNOSIS — M25.532 LEFT WRIST PAIN: Primary | ICD-10-CM

## 2024-05-07 DIAGNOSIS — I50.32 CHRONIC DIASTOLIC HEART FAILURE WITH PRESERVED EJECTION FRACTION (HCC): ICD-10-CM

## 2024-05-07 DIAGNOSIS — I10 PRIMARY HYPERTENSION: ICD-10-CM

## 2024-05-07 PROCEDURE — 99214 OFFICE O/P EST MOD 30 MIN: CPT | Performed by: FAMILY MEDICINE

## 2024-05-07 PROCEDURE — G2211 COMPLEX E/M VISIT ADD ON: HCPCS | Performed by: FAMILY MEDICINE

## 2024-05-07 NOTE — PROGRESS NOTES
Name: Hood Ojeda      : 1945      MRN: [id number]  Encounter Provider: Timmy Camacho MD  Encounter Date: 2024   Encounter department: [facility] PRIMARY CARE    Assessment & Plan     X-ray left wrist, refer to hand surgery.  May continue Tylenol, states that this does help with the pain.  He does have a history of CHF, he does have bilateral leg edema.  Recommend patient to take 1 additional dose of furosemide.  Follow-up with cardiology.  Blood pressure stable today 124/70, continue amlodipine 10 mg as well as losartan 25 mg.  Continue statin for hyperlipidemia.  RTC as needed    1. Left wrist pain  -     XR wrist 3+ vw left; Future; Expected date: 2024  -     Ambulatory Referral to Orthopedic Surgery; Future    2. Chronic diastolic heart failure with preserved ejection fraction (HCC)    3. Primary hypertension    4. Mixed hyperlipidemia           Subjective      He presents today to discuss a 2-year history of left wrist pain with numbness and tingling.  Denies any injury to the area.  Also reporting both ankles are swelling.  He does take furosemide for CHF.  Compliant with his medications otherwise.  Denies any shortness of breath.    Wrist Pain   Associated symptoms include numbness. Pertinent negatives include no fever.     Review of Systems   Constitutional:  Negative for activity change, appetite change, chills, fatigue and fever.   HENT:  Negative for congestion, rhinorrhea, sneezing and sore throat.    Eyes:  Negative for pain, discharge, redness and itching.   Respiratory:  Negative for cough, chest tightness, shortness of breath and wheezing.    Cardiovascular:  Negative for chest pain and palpitations.   Gastrointestinal:  Negative for abdominal pain, constipation, diarrhea, nausea and vomiting.   Musculoskeletal:  Positive for arthralgias and myalgias. Negative for gait problem and neck pain.        Bilateral leg swelling   Skin:  Negative for rash.  "  Neurological:  Positive for numbness. Negative for dizziness, weakness and headaches.   Hematological:  Negative for adenopathy.   Psychiatric/Behavioral:  Negative for confusion and dysphoric mood. The patient is not nervous/anxious.    All other systems reviewed and are negative.      Current Outpatient Medications on File Prior to Visit   Medication Sig   • acetaminophen (TYLENOL) 325 mg tablet Take 650 mg by mouth every 6 (six) hours as needed for mild pain As needed   • amLODIPine (NORVASC) 10 mg tablet TAKE 1 TABLET BY MOUTH EVERY DAY   • atorvastatin (LIPITOR) 40 mg tablet TAKE 1 TABLET BY MOUTH EVERY DAY WITH DINNER   • clopidogrel (PLAVIX) 75 mg tablet TAKE 1 TABLET BY MOUTH EVERY DAY   • furosemide (LASIX) 20 mg tablet Take 1 tablet (20 mg total) by mouth every other day Take it in the morning.   • losartan (COZAAR) 25 mg tablet TAKE 1 TABLET (25 MG TOTAL) BY MOUTH DAILY.   • montelukast (SINGULAIR) 10 mg tablet TAKE 1 TABLET BY MOUTH DAILY AT BEDTIME   • spironolactone (ALDACTONE) 25 mg tablet Take 0.5 tablets (12.5 mg total) by mouth daily   • benzonatate (TESSALON) 200 MG capsule Take 1 capsule (200 mg total) by mouth 3 (three) times a day as needed for cough (Patient not taking: Reported on 7/21/2023)   • triamcinolone (KENALOG) 0.1 % cream Apply topically 2 (two) times a day (Patient not taking: Reported on 11/2/2023)       Objective     /70   Pulse 59   Temp 98.1 °F (36.7 °C) (Temporal)   Ht 5' 10\" (1.778 m)   Wt 95.3 kg (210 lb 3.2 oz)   SpO2 96%   BMI 30.16 kg/m²     Physical Exam  Vitals reviewed.   Constitutional:       General: He is not in acute distress.     Appearance: Normal appearance. He is well-developed. He is obese. He is not toxic-appearing or diaphoretic.   HENT:      Head: Normocephalic and atraumatic.      Right Ear: External ear normal.      Left Ear: External ear normal.      Nose: Nose normal.      Mouth/Throat:      Mouth: Mucous membranes are moist.   Eyes:      " General: No scleral icterus.        Right eye: No discharge.         Left eye: No discharge.      Conjunctiva/sclera: Conjunctivae normal.   Cardiovascular:      Rate and Rhythm: Normal rate and regular rhythm.      Pulses: Normal pulses.      Heart sounds: Normal heart sounds. No murmur heard.  Pulmonary:      Effort: Pulmonary effort is normal. No respiratory distress.      Breath sounds: Normal breath sounds. No wheezing.   Abdominal:      General: There is no distension.      Palpations: Abdomen is soft. There is no mass.      Tenderness: There is no abdominal tenderness.      Hernia: No hernia is present.   Musculoskeletal:         General: Swelling (left wrist) present. No tenderness, deformity or signs of injury. Normal range of motion.      Cervical back: Normal range of motion.      Right lower leg: Edema present.      Left lower leg: Edema present.   Skin:     General: Skin is warm.      Findings: No bruising, erythema, lesion or rash.   Neurological:      General: No focal deficit present.      Mental Status: He is alert. Mental status is at baseline.      Motor: No weakness.      Gait: Gait normal.   Psychiatric:         Mood and Affect: Mood normal.         Behavior: Behavior normal.       Timmy Camacho MD

## 2024-05-08 ENCOUNTER — HOSPITAL ENCOUNTER (OUTPATIENT)
Dept: RADIOLOGY | Facility: HOSPITAL | Age: 79
Discharge: HOME | End: 2024-05-08
Payer: COMMERCIAL

## 2024-05-08 DIAGNOSIS — M25.532 LEFT WRIST PAIN: ICD-10-CM

## 2024-05-08 PROCEDURE — 73110 X-RAY EXAM OF WRIST: CPT

## 2024-05-13 DIAGNOSIS — Z78.9 DIFFICULTY USING CONTINUOUS POSITIVE AIRWAY PRESSURE (CPAP) DEVICE: ICD-10-CM

## 2024-05-13 DIAGNOSIS — G47.33 OBSTRUCTIVE SLEEP APNEA TREATED WITH CONTINUOUS POSITIVE AIRWAY PRESSURE (CPAP): Primary | ICD-10-CM

## 2024-05-15 ENCOUNTER — TELEPHONE (OUTPATIENT)
Dept: SLEEP CENTER | Facility: CLINIC | Age: 79
End: 2024-05-15

## 2024-05-15 DIAGNOSIS — J31.0 CHRONIC RHINITIS: ICD-10-CM

## 2024-05-15 RX ORDER — MONTELUKAST SODIUM 10 MG/1
10 TABLET ORAL
Qty: 90 TABLET | Refills: 1 | Status: SHIPPED | OUTPATIENT
Start: 2024-05-15

## 2024-05-15 NOTE — TELEPHONE ENCOUNTER
Called patient and advised CPAP study resulted and pressure change ordered. Patient has Smita machine and states he previously needed to bring in to office for a pressure change.      Scheduled appointment tomorrow with [facility] to change pressure. Patient advised to bring machine and power cord to appointment, verbalized understanding.     RX for PAP pressure change order sent to [facility] via [facility]    Patient has follow up scheduled May 2025.

## 2024-05-16 LAB
DME PARACHUTE DELIVERY DATE ACTUAL: NORMAL
DME PARACHUTE DELIVERY DATE REQUESTED: NORMAL
DME PARACHUTE DELIVERY NOTE: NORMAL
DME PARACHUTE ITEM DESCRIPTION: NORMAL
DME PARACHUTE ORDER STATUS: NORMAL
DME PARACHUTE SUPPLIER NAME: NORMAL
DME PARACHUTE SUPPLIER PHONE: NORMAL

## 2024-06-29 DIAGNOSIS — I63.81 LEFT SIDED LACUNAR INFARCTION (HCC): ICD-10-CM

## 2024-06-29 DIAGNOSIS — I65.21 STENOSIS OF RIGHT CAROTID ARTERY: ICD-10-CM

## 2024-06-29 RX ORDER — CLOPIDOGREL BISULFATE 75 MG/1
TABLET ORAL
Qty: 90 TABLET | Refills: 1 | Status: SHIPPED | OUTPATIENT
Start: 2024-06-29

## 2024-07-16 DIAGNOSIS — I63.81 LEFT SIDED LACUNAR INFARCTION (HCC): ICD-10-CM

## 2024-07-16 DIAGNOSIS — I61.9 HEMORRHAGIC STROKE (HCC): ICD-10-CM

## 2024-07-16 DIAGNOSIS — E78.5 HYPERLIPIDEMIA: ICD-10-CM

## 2024-07-16 RX ORDER — ATORVASTATIN CALCIUM 40 MG/1
TABLET, FILM COATED ORAL
Qty: 90 TABLET | Refills: 1 | Status: SHIPPED | OUTPATIENT
Start: 2024-07-16

## 2024-08-02 ENCOUNTER — NURSE TRIAGE (OUTPATIENT)
Age: 79
End: 2024-08-02

## 2024-08-02 NOTE — TELEPHONE ENCOUNTER
"Patient's SBP has been between 150 and 200 yesterday and today. He is non-symptomatic today and his most recent BP was 175/54. His EC would like a call back to advise if patient can be seen in the office today or if he should go to the ED. Attempted to warm transfer and was unsuccessful. EC advised to take patient to the ED if SBP goes above 200 or if symptoms develop and will await a call back from the office.     Reason for Disposition   Systolic BP >= 180 OR Diastolic >= 110    Answer Assessment - Initial Assessment Questions  1. BLOOD PRESSURE: \"What is the blood pressure?\" \"Did you take at least two measurements 5 minutes apart?\"      175/54, 204/61  2. ONSET: \"When did you take your blood pressure?\"      1045   3. HOW: \"How did you obtain the blood pressure?\" (e.g., visiting nurse, automatic home BP monitor)      Home BP monitor   4. HISTORY: \"Do you have a history of high blood pressure?\"      Yes   5. MEDICATIONS: \"Are you taking any medications for blood pressure?\" \"Have you missed any doses recently?\"      Yes, denies missed doses   6. OTHER SYMPTOMS: \"Do you have any symptoms?\" (e.g., headache, chest pain, blurred vision, difficulty breathing, weakness)      Denies today, yesterday had discoloration, fatigue   7. PREGNANCY: \"Is there any chance you are pregnant?\" \"When was your last menstrual period?\"      N/A    Protocols used: Blood Pressure - High-ADULT-[place]    "

## 2024-08-02 NOTE — TELEPHONE ENCOUNTER
He can increase his Losartan to 2 tablets. If he is symptomatic or his blood pressure continues to be elevated, recommend ER visit

## 2024-08-02 NOTE — TELEPHONE ENCOUNTER
I called and spoke with the patient's spouse and made her aware she verbalized understanding and agreement.

## 2024-08-03 ENCOUNTER — HOSPITAL ENCOUNTER (EMERGENCY)
Facility: HOSPITAL | Age: 79
Discharge: HOME | End: 2024-08-03
Attending: EMERGENCY MEDICINE
Payer: COMMERCIAL

## 2024-08-03 ENCOUNTER — APPOINTMENT (EMERGENCY)
Dept: CT IMAGING | Facility: HOSPITAL | Age: 79
End: 2024-08-03
Payer: COMMERCIAL

## 2024-08-03 ENCOUNTER — APPOINTMENT (EMERGENCY)
Dept: RADIOLOGY | Facility: HOSPITAL | Age: 79
End: 2024-08-03
Payer: COMMERCIAL

## 2024-08-03 VITALS
DIASTOLIC BLOOD PRESSURE: 70 MMHG | SYSTOLIC BLOOD PRESSURE: 144 MMHG | BODY MASS INDEX: 29.73 KG/M2 | RESPIRATION RATE: 18 BRPM | HEART RATE: 62 BPM | OXYGEN SATURATION: 95 % | WEIGHT: 207.23 LBS | TEMPERATURE: 97.9 F

## 2024-08-03 DIAGNOSIS — I10 HYPERTENSION: Primary | ICD-10-CM

## 2024-08-03 LAB
2HR DELTA HS TROPONIN: -1 NG/L
ALBUMIN SERPL BCG-MCNC: 4.1 G/DL (ref 3.5–5)
ALP SERPL-CCNC: 79 U/L (ref 34–104)
ALT SERPL W P-5'-P-CCNC: 14 U/L (ref 7–52)
ANION GAP SERPL CALCULATED.3IONS-SCNC: 5 MMOL/L (ref 4–13)
AST SERPL W P-5'-P-CCNC: 11 U/L (ref 13–39)
ATRIAL RATE: 54 BPM
ATRIAL RATE: 63 BPM
BASOPHILS # BLD AUTO: 0.02 THOUSANDS/ÂΜL (ref 0–0.1)
BASOPHILS NFR BLD AUTO: 0 % (ref 0–1)
BILIRUB SERPL-MCNC: 0.86 MG/DL (ref 0.2–1)
BUN SERPL-MCNC: 12 MG/DL (ref 5–25)
CALCIUM SERPL-MCNC: 9.3 MG/DL (ref 8.4–10.2)
CARDIAC TROPONIN I PNL SERPL HS: 16 NG/L
CARDIAC TROPONIN I PNL SERPL HS: 17 NG/L
CHLORIDE SERPL-SCNC: 104 MMOL/L (ref 96–108)
CO2 SERPL-SCNC: 30 MMOL/L (ref 21–32)
CREAT SERPL-MCNC: 0.82 MG/DL (ref 0.6–1.3)
EOSINOPHIL # BLD AUTO: 0.06 THOUSAND/ÂΜL (ref 0–0.61)
EOSINOPHIL NFR BLD AUTO: 1 % (ref 0–6)
ERYTHROCYTE [DISTWIDTH] IN BLOOD BY AUTOMATED COUNT: 13.4 % (ref 11.6–15.1)
FLUAV RNA RESP QL NAA+PROBE: NEGATIVE
FLUBV RNA RESP QL NAA+PROBE: NEGATIVE
GFR SERPL CREATININE-BSD FRML MDRD: 83 ML/MIN/1.73SQ M
GLUCOSE SERPL-MCNC: 104 MG/DL (ref 65–140)
HCT VFR BLD AUTO: 43.2 % (ref 36.5–49.3)
HGB BLD-MCNC: 13.9 G/DL (ref 12–17)
IMM GRANULOCYTES # BLD AUTO: 0.02 THOUSAND/UL (ref 0–0.2)
IMM GRANULOCYTES NFR BLD AUTO: 0 % (ref 0–2)
LYMPHOCYTES # BLD AUTO: 1.59 THOUSANDS/ÂΜL (ref 0.6–4.47)
LYMPHOCYTES NFR BLD AUTO: 22 % (ref 14–44)
MCH RBC QN AUTO: 29.1 PG (ref 26.8–34.3)
MCHC RBC AUTO-ENTMCNC: 32.2 G/DL (ref 31.4–37.4)
MCV RBC AUTO: 91 FL (ref 82–98)
MONOCYTES # BLD AUTO: 0.58 THOUSAND/ÂΜL (ref 0.17–1.22)
MONOCYTES NFR BLD AUTO: 8 % (ref 4–12)
NEUTROPHILS # BLD AUTO: 4.94 THOUSANDS/ÂΜL (ref 1.85–7.62)
NEUTS SEG NFR BLD AUTO: 69 % (ref 43–75)
NRBC BLD AUTO-RTO: 0 /100 WBCS
P AXIS: 15 DEGREES
P AXIS: 61 DEGREES
PLATELET # BLD AUTO: 184 THOUSANDS/UL (ref 149–390)
PMV BLD AUTO: 10.1 FL (ref 8.9–12.7)
POTASSIUM SERPL-SCNC: 4.3 MMOL/L (ref 3.5–5.3)
PR INTERVAL: 166 MS
PR INTERVAL: 190 MS
PROT SERPL-MCNC: 6.8 G/DL (ref 6.4–8.4)
QRS AXIS: -57 DEGREES
QRS AXIS: -62 DEGREES
QRSD INTERVAL: 120 MS
QRSD INTERVAL: 120 MS
QT INTERVAL: 424 MS
QT INTERVAL: 464 MS
QTC INTERVAL: 433 MS
QTC INTERVAL: 440 MS
RBC # BLD AUTO: 4.77 MILLION/UL (ref 3.88–5.62)
RSV RNA RESP QL NAA+PROBE: NEGATIVE
SARS-COV-2 RNA RESP QL NAA+PROBE: NEGATIVE
SODIUM SERPL-SCNC: 139 MMOL/L (ref 135–147)
T WAVE AXIS: -12 DEGREES
T WAVE AXIS: 6 DEGREES
VENTRICULAR RATE: 54 BPM
VENTRICULAR RATE: 63 BPM
WBC # BLD AUTO: 7.21 THOUSAND/UL (ref 4.31–10.16)

## 2024-08-03 PROCEDURE — 70450 CT HEAD/BRAIN W/O DYE: CPT

## 2024-08-03 PROCEDURE — 0241U HB NFCT DS VIR RESP RNA 4 TRGT: CPT | Performed by: EMERGENCY MEDICINE

## 2024-08-03 PROCEDURE — 85025 COMPLETE CBC W/AUTO DIFF WBC: CPT

## 2024-08-03 PROCEDURE — 36415 COLL VENOUS BLD VENIPUNCTURE: CPT

## 2024-08-03 PROCEDURE — 99285 EMERGENCY DEPT VISIT HI MDM: CPT

## 2024-08-03 PROCEDURE — 99284 EMERGENCY DEPT VISIT MOD MDM: CPT | Performed by: EMERGENCY MEDICINE

## 2024-08-03 PROCEDURE — 93005 ELECTROCARDIOGRAM TRACING: CPT

## 2024-08-03 PROCEDURE — 84484 ASSAY OF TROPONIN QUANT: CPT

## 2024-08-03 PROCEDURE — 80053 COMPREHEN METABOLIC PANEL: CPT

## 2024-08-03 PROCEDURE — 71045 X-RAY EXAM CHEST 1 VIEW: CPT

## 2024-08-03 PROCEDURE — 93010 ELECTROCARDIOGRAM REPORT: CPT | Performed by: INTERNAL MEDICINE

## 2024-08-03 NOTE — ED PROVIDER NOTES
"History  Chief Complaint   Patient presents with    Hypertension     Blood pressures noted to be in the 200s systolically. Pt blood pressure should not be over 130s systolically due to brain hemorrhage last year. PCP instructed pt to go to ED to get checked. Wife states pt has been sleeping more than normal and looks pale. Pt instructed to take an extra dose of losartan today, took 50mg this morning      Edward is a very pleasant 78-year-old male here for evaluation of multiple complaints.  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  He is able to speak but states that since his stroke he has dealt with mild dysarthria and occasional word finding difficulty.  He states that his issues are difficult to describe but notes \"I feel like I have never been fully awake since the stroke.\"  And that he feels that he has to concentrate or \"focus\" more than he previously would have had to.  Patient's wife is concerned because for weeks to months she thinks patient has been overly fatigued and tends to sleep a fair amount during the day.  She has been checking his blood pressure over the past several days and is noted that it has been as high as 200 systolic at times.  She told their PCP about this and patient was instructed to take an extra dose of losartan this morning and be evaluated in the ED.  Patient denies any fevers.  He is not having any localized numbness, weakness, tingling.  He denies headache, chest pain, or shortness of breath.  He reports a normal diet and denies abdominal pain, nausea, vomiting, or changes in urination.          Prior to Admission Medications   Prescriptions Last Dose Informant Patient Reported? Taking?   acetaminophen (TYLENOL) 325 mg tablet  Self Yes Yes   Sig: Take 650 mg by mouth every 6 (six) hours as needed for mild pain As needed   amLODIPine (NORVASC) 10 mg tablet   No Yes   Sig: TAKE 1 TABLET BY MOUTH " EVERY DAY   atorvastatin (LIPITOR) 40 mg tablet   No Yes   Sig: TAKE 1 TABLET BY MOUTH EVERY DAY WITH DINNER   clopidogrel (PLAVIX) 75 mg tablet   No Yes   Sig: TAKE 1 TABLET BY MOUTH EVERY DAY   furosemide (LASIX) 20 mg tablet  Self No Yes   Sig: Take 1 tablet (20 mg total) by mouth every other day Take it in the morning.   losartan (COZAAR) 25 mg tablet   No Yes   Sig: TAKE 1 TABLET (25 MG TOTAL) BY MOUTH DAILY.   montelukast (SINGULAIR) 10 mg tablet   No Yes   Sig: TAKE 1 TABLET BY MOUTH EVERYDAY AT BEDTIME   spironolactone (ALDACTONE) 25 mg tablet  Self No Yes   Sig: Take 0.5 tablets (12.5 mg total) by mouth daily      Facility-Administered Medications: None       Past Medical History:   Diagnosis Date    Cancer (HCC)     prostate    Carotid stenosis     Hypertension     Left sided lacunar infarction (HCC) 12/30/2020       Past Surgical History:   Procedure Laterality Date    TONSILLECTOMY      TRANSURETHRAL RESECTION OF PROSTATE         Family History   Problem Relation Age of Onset    Colon cancer Mother     No Known Problems Father     Leukemia Maternal Grandmother     Diabetes Maternal Grandmother     Alcohol abuse Maternal Grandfather     No Known Problems Paternal Grandmother     No Known Problems Paternal Grandfather      I have reviewed and agree with the history as documented.    E-Cigarette/Vaping    E-Cigarette Use Never User      E-Cigarette/Vaping Substances    Nicotine No     THC No     CBD No     Flavoring No     Other No     Unknown No      Social History     Tobacco Use    Smoking status: Former    Smokeless tobacco: Never   Vaping Use    Vaping status: Never Used   Substance Use Topics    Alcohol use: Yes     Alcohol/week: 4.0 standard drinks of alcohol     Types: 4 Cans of beer per week     Comment: several days a week    Drug use: Never       Review of Systems   Constitutional:  Negative for chills and fever.   HENT:  Negative for sore throat.    Eyes:  Negative for visual disturbance.    Respiratory:  Negative for cough and shortness of breath.    Cardiovascular:  Negative for chest pain and palpitations.   Gastrointestinal:  Negative for abdominal pain, nausea and vomiting.   Genitourinary:  Negative for dysuria and hematuria.   Musculoskeletal:  Negative for arthralgias and back pain.   Skin:  Negative for color change and rash.   Neurological:  Negative for seizures and syncope.   All other systems reviewed and are negative.      Physical Exam  Physical Exam  Vitals and nursing note reviewed.   Constitutional:       General: He is not in acute distress.     Appearance: He is well-developed.   HENT:      Head: Normocephalic and atraumatic.      Mouth/Throat:      Mouth: Mucous membranes are moist.   Eyes:      Conjunctiva/sclera: Conjunctivae normal.   Cardiovascular:      Rate and Rhythm: Normal rate and regular rhythm.      Heart sounds: No murmur heard.  Pulmonary:      Effort: Pulmonary effort is normal. No respiratory distress.      Breath sounds: Normal breath sounds.   Abdominal:      Palpations: Abdomen is soft.      Tenderness: There is no abdominal tenderness.   Musculoskeletal:         General: No swelling.      Cervical back: Neck supple.   Skin:     General: Skin is warm and dry.      Capillary Refill: Capillary refill takes less than 2 seconds.   Neurological:      General: No focal deficit present.      Mental Status: He is alert and oriented to person, place, and time.      Cranial Nerves: No cranial nerve deficit.      Comments: GCS 15 without focal deficits.  Cranial nerves II through XII are grossly intact.  Patient has mild intermittent dysarthria and slightly deliberate speech which he and his wife state are at baseline.   Psychiatric:         Mood and Affect: Mood normal.         Vital Signs  ED Triage Vitals   Temperature Pulse Respirations Blood Pressure SpO2   08/03/24 0930 08/03/24 0930 08/03/24 0930 08/03/24 0930 08/03/24 0930   97.9 °F (36.6 °C) 64 16 168/72 97 %       Temp Source Heart Rate Source Patient Position - Orthostatic VS BP Location FiO2 (%)   08/03/24 0930 08/03/24 0930 08/03/24 0930 08/03/24 0930 --   Oral Monitor Sitting Right arm       Pain Score       08/03/24 1109       No Pain           Vitals:    08/03/24 0930 08/03/24 1030 08/03/24 1109 08/03/24 1352   BP: 168/72 131/67 135/60 144/70   Pulse: 64 60 60 62   Patient Position - Orthostatic VS: Sitting Lying Lying Lying         Visual Acuity  Visual Acuity      Flowsheet Row Most Recent Value   L Pupil Size (mm) 3   R Pupil Size (mm) 3            ED Medications  Medications - No data to display    Diagnostic Studies  Results Reviewed       Procedure Component Value Units Date/Time    HS Troponin I 2hr [[id number]]  (Normal) Collected: 08/03/24 1214    Lab Status: Final result Specimen: Blood from Arm, Right Updated: 08/03/24 1253     hs TnI 2hr 16 ng/L      Delta 2hr hsTnI -1 ng/L     FLU/RSV/COVID - if FLU/RSV clinically relevant [[id number]]  (Normal) Collected: 08/03/24 1022    Lab Status: Final result Specimen: Nares from Nose Updated: 08/03/24 1134     SARS-CoV-2 Negative     INFLUENZA A PCR Negative     INFLUENZA B PCR Negative     RSV PCR Negative    Narrative:      FOR PEDIATRIC PATIENTS - copy/paste COVID Guidelines URL to browser: [email or web address]    SARS-CoV-2 assay is a Nucleic Acid Amplification assay intended for the  qualitative detection of nucleic acid from SARS-CoV-2 in nasopharyngeal  swabs. Results are for the presumptive identification of SARS-CoV-2 RNA.    Positive results are indicative of infection with SARS-CoV-2, the virus  causing COVID-19, but do not rule out bacterial infection or co-infection  with other viruses. Laboratories within the [place] and its  territories are required to report all positive results to the appropriate  public health authorities. Negative results do not preclude SARS-CoV-2  infection and should not  be used as the sole basis for treatment or other  patient management decisions. Negative results must be combined with  clinical observations, patient history, and epidemiological information.  This test has not been FDA cleared or approved.    This test has been authorized by FDA under an Emergency Use Authorization  (EUA). This test is only authorized for the duration of time the  declaration that circumstances exist justifying the authorization of the  emergency use of an in vitro diagnostic tests for detection of SARS-CoV-2  virus and/or diagnosis of COVID-19 infection under section 564(b)(1) of  the Act, 21 U.S.C. 360bbb-3(b)(1), unless the authorization is terminated  or revoked sooner. The test has been validated but independent review by FDA  and CLIA is pending.    Test performed using [employer]: This RT-PCR assay targets N2,  a region unique to SARS-CoV-2. A conserved region in the E-gene was chosen  for pan-Sarbecovirus detection which includes SARS-CoV-2.    According to CMS-2020-01-R, this platform meets the definition of high-throughput technology.    HS Troponin 0hr (reflex protocol) [[id number]]  (Normal) Collected: 08/03/24 1001    Lab Status: Final result Specimen: Blood from Arm, Right Updated: 08/03/24 1033     hs TnI 0hr 17 ng/L     Comprehensive metabolic panel [[id number]]  (Abnormal) Collected: 08/03/24 1001    Lab Status: Final result Specimen: Blood from Arm, Right Updated: 08/03/24 1027     Sodium 139 mmol/L      Potassium 4.3 mmol/L      Chloride 104 mmol/L      CO2 30 mmol/L      ANION GAP 5 mmol/L      BUN 12 mg/dL      Creatinine 0.82 mg/dL      Glucose 104 mg/dL      Calcium 9.3 mg/dL      AST 11 U/L      ALT 14 U/L      Alkaline Phosphatase 79 U/L      Total Protein 6.8 g/dL      Albumin 4.1 g/dL      Total Bilirubin 0.86 mg/dL      eGFR 83 ml/min/1.73sq m     Narrative:      National Kidney Disease Foundation guidelines for Chronic Kidney Disease (CKD):     Stage 1 with normal  or high GFR (GFR > 90 mL/min/1.73 square meters)    Stage 2 Mild CKD (GFR = 60-89 mL/min/1.73 square meters)    Stage 3A Moderate CKD (GFR = 45-59 mL/min/1.73 square meters)    Stage 3B Moderate CKD (GFR = 30-44 mL/min/1.73 square meters)    Stage 4 Severe CKD (GFR = 15-29 mL/min/1.73 square meters)    Stage 5 End Stage CKD (GFR <15 mL/min/1.73 square meters)  Note: GFR calculation is accurate only with a steady state creatinine    CBC and differential [[id number]] Collected: 08/03/24 1001    Lab Status: Final result Specimen: Blood from Arm, Right Updated: 08/03/24 1008     WBC 7.21 Thousand/uL      RBC 4.77 Million/uL      Hemoglobin 13.9 g/dL      Hematocrit 43.2 %      MCV 91 fL      MCH 29.1 pg      MCHC 32.2 g/dL      RDW 13.4 %      MPV 10.1 fL      Platelets 184 Thousands/uL      nRBC 0 /100 WBCs      Segmented % 69 %      Immature Grans % 0 %      Lymphocytes % 22 %      Monocytes % 8 %      Eosinophils Relative 1 %      Basophils Relative 0 %      Absolute Neutrophils 4.94 Thousands/µL      Absolute Immature Grans 0.02 Thousand/uL      Absolute Lymphocytes 1.59 Thousands/µL      Absolute Monocytes 0.58 Thousand/µL      Eosinophils Absolute 0.06 Thousand/µL      Basophils Absolute 0.02 Thousands/µL                    CT head without contrast   Final Result by Braulio Anderson MD (08/03 1112)      No evidence of acute intracranial process.      Chronic microangiopathy.                  Workstation performed: [id number]         XR chest 1 view portable   Final Result by Charmaine Morgan MD (08/03 1102)      No acute cardiopulmonary disease.            Workstation performed: [id number]                    Procedures  Procedures         ED Course                                 SBIRT 20yo+      Flowsheet Row Most Recent Value   Initial Alcohol Screen: US AUDIT-C     1. How often do you have a drink containing alcohol? 0 Filed at: 08/03/2024 0948   2. How many drinks containing alcohol do you have  on a typical day you are drinking?  0 Filed at: 08/03/2024 0948   3b. FEMALE Any Age, or MALE 65+: How often do you have 4 or more drinks on one occassion? 0 Filed at: 08/03/2024 0948   Audit-C Score 0 Filed at: 08/03/2024 0948   EMILY: How many times in the past year have you...    Used an illegal drug or used a prescription medication for non-medical reasons? Never Filed at: 08/03/2024 0948                      Medical Decision Making  78-year-old male presented for evaluation of elevated blood pressures.  He does have a history of previous hemorrhagic stroke with some mild residual speech difficulty.  He denies any new neurologic symptoms.  Was noted to have mild intermittent dysarthria on exam otherwise without any discernible deficits.  CT head was negative for intracranial hemorrhage or other acute abnormality.  Laboratory studies were performed to rule out electrolyte abnormalities, significant anemia and were reassuring.  EKG and troponin x 2 were stable and without evidence of acute ischemia.  Patient did not have any chest pain or shortness of breath.  I do long discussion with the patient and his wife regarding risks and benefits of admission versus discharge.  We discussed the fact that there was no way for me to completely rule out recent CVA that could attributed to dysarthria, confusion, or fatigue without admission, neurology evaluation, and MRI.  However, patient states he does not have any new deficits or significant symptoms, was here simply for evaluation of elevated BP which has improved spontaneously without intervention in the ED.  I offered admission for further evaluation but after discussion of risks and benefits patient ultimately opted to decline.  He will follow-up closely with his primary care physician and return to the ED with any new or worsening symptoms.    Amount and/or Complexity of Data Reviewed  Labs: ordered. Decision-making details documented in ED Course.  Radiology: ordered.  Decision-making details documented in ED Course.  ECG/medicine tests: ordered and independent interpretation performed. Decision-making details documented in ED Course.     Details: Mild sinus bradycardia rate of 54.  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  No significant ST elevations or depressions to suggest cardiac ischemia.                 Disposition  Final diagnoses:   Hypertension     Time reflects when diagnosis was documented in both MDM as applicable and the Disposition within this note       Time User Action Codes Description Comment    8/3/2024  1:45 PM Joe Ga Add [I10] Hypertension           ED Disposition       ED Disposition   Discharge    Condition   Stable    Date/Time   Sat Aug 3, 2024 1345    Comment   Hood Ojeda discharge to home/self care.                   Follow-up Information       Follow up With Specialties Details Why Contact Info    Timmy Camacho MD Family Medicine   [facility] [place] [address]  [phone]              Discharge Medication List as of 8/3/2024  1:46 PM        CONTINUE these medications which have NOT CHANGED    Details   acetaminophen (TYLENOL) 325 mg tablet Take 650 mg by mouth every 6 (six) hours as needed for mild pain As needed, Historical Med      amLODIPine (NORVASC) 10 mg tablet TAKE 1 TABLET BY MOUTH EVERY DAY, Normal      atorvastatin (LIPITOR) 40 mg tablet TAKE 1 TABLET BY MOUTH EVERY DAY WITH DINNER, Normal      clopidogrel (PLAVIX) 75 mg tablet TAKE 1 TABLET BY MOUTH EVERY DAY, Normal      furosemide (LASIX) 20 mg tablet Take 1 tablet (20 mg total) by mouth every other day Take it in the morning., Starting Tue 1/9/2024, Normal      losartan (COZAAR) 25 mg tablet TAKE 1 TABLET (25 MG TOTAL) BY MOUTH DAILY., Starting Fri 3/29/2024, Normal      montelukast (SINGULAIR) 10 mg tablet TAKE 1 TABLET BY MOUTH EVERYDAY AT BEDTIME, Starting Wed 5/15/2024, Normal      spironolactone  (ALDACTONE) 25 mg tablet Take 0.5 tablets (12.5 mg total) by mouth daily, Starting Wed 1/24/2024, Normal             No discharge procedures on file.    PDMP Review         Value Time User    PDMP Reviewed  Yes 12/29/2020  8:44 PM Heber Cook DO            ED Provider  Electronically Signed by             Joe Ga MD  08/03/24 6627

## 2024-08-05 ENCOUNTER — VBI (OUTPATIENT)
Dept: FAMILY MEDICINE CLINIC | Facility: CLINIC | Age: 79
End: 2024-08-05

## 2024-08-05 ENCOUNTER — NURSE TRIAGE (OUTPATIENT)
Age: 79
End: 2024-08-05

## 2024-08-05 NOTE — TELEPHONE ENCOUNTER
08/05/24 11:04 AM    Patient contacted post ED visit, [facility] phone outreaches documented. Patient called practice and scheduled a follow-up ED visit.  Cardio outreach    Thank you.  Jennifer Starr [facility]

## 2024-08-05 NOTE — TELEPHONE ENCOUNTER
"Reason for Disposition  • Systolic BP >= 160 OR Diastolic >= 100    Answer Assessment - Initial Assessment Questions  1. BLOOD PRESSURE: \"What is the blood pressure?\" \"Did you take at least two measurements 5 minutes apart?\"      177/71 This AM  2. ONSET: \"When did you take your blood pressure?\"      This morning after medication.    3. HOW: \"How did you obtain the blood pressure?\" (e.g., visiting nurse, automatic home BP monitor)      Home automatic BP cuiff  4. HISTORY: \"Do you have a history of high blood pressure?\"      Yes  5. MEDICATIONS: \"Are you taking any medications for blood pressure?\" \"Have you missed any doses recently?\"      Losartan and Amlodipine  6. OTHER SYMPTOMS: \"Do you have any symptoms?\" (e.g., headache, chest pain, blurred vision, difficulty breathing, weakness)      No other symptoms    Protocols used: High Blood Pressure-ADULT-[place]    "

## 2024-08-05 NOTE — TELEPHONE ENCOUNTER
Patient was in the ED on 8/3 with  systolic.  Advised to go to ED by PCP after a call on 8/2 with elevated BP.  Increased losartan as advised by PCP and the next day, BP still elevated. BP in /72, 131/67, 135/60, 144/70.    Today spouse called reporting BP after AM meds is 177/71 HR 69 on home  BP cuff.  History of stroke in 2021.  Spouse also concerned about abnormal EKG done in the ER .    Patient scheduled with Ofelia Vieyra for follow up on 8/7.  Will also call PCP regarding ER visit.    No headache or weakness.

## 2024-08-06 NOTE — PROGRESS NOTES
Cardiology  ER  Follow Up Office Visit Note -     Hood Ojeda   78 y.o.   male   MRN: [id number]  [facility]  [address] [address]  [address]  [place] [address] [phone]    PCP: Timmy Camacho MD  Cardiologist : Dr Lara          Summary of Plan:  Low-sodium diet.  I do suspect some high sodium consumption of recent- ie gatorade.  He was educated about how to read a food label to facilitate adherence  He should take either losartan or amlodipine in the morning, and the other at night.  He should take his diuretics in the morning, as he is remote reporting high blood pressures  He was provided an educational handout about how to properly take home blood pressures  He should avoid NSAIDs, black licorice, decongestants, excess ETOH, > 7/wk, less than 2/d  Periodic home blood pressure monitoring with an upper arm cuff.  He currently has a wrist cuff which is not recommended.  Next week he should send us a log of his blood pressures through MyChart messaging  Follow up will be scheduled with Dr Lara 6 months             Impression/plan  Hypertension, essential. /72. On, spironolactone 12.5 mg daily,Amlodipine 10 mg daily, losartan 25 mg daily, Lasix 20 mg every other day  DASH diet  Periodic home blood pressure monitoring, with a valid upper arm cuff which we discussed today  He should take either amlodipine or losartan in the morning and the other 1 at night before he goes to bed.  Diuretics in the morning.    Chronic HFpEF  Wt Readings from Last 3 Encounters:   08/07/24 92.5 kg (204 lb)   08/03/24 94 kg (207 lb 3.7 oz)   05/07/24 95.3 kg (210 lb 3.2 oz)   --beta-blocker: None  --Diuretic: Lasix 20 mg every other day beginning January 9, 2024  --SGLT2i:  --2 g sodium diet, 1800 cc fluid restriction. Daily weights.   Hyperlipidemia. On atorvastatin 40 mg/d.  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 There was 22 hours 30 minutes of bradycardia. There was 5 minutes 42 seconds of tachycardia. Predominant rhythm was normal sinus. There was 1.6% supraventricular ectopy burden. There were 6 supraventricular runs with longest run of 5 beats and fastest being 150 bpm. There were 106 atrial pairs. Longest R-R interval was 1.8 seconds. There was no significant ventricular ectopy.  ECHOCARDIOGRAM 2/17/2023: EF 70%.  Grade 2 DD.  Mild LVH.  Normal RV size and function.  Mild LAE  SPECT 11/8/23 There are no perfusion defects. Stress Function: Left ventricular function post-stress is normal. Stress ejection fraction is 66 %. Stress Combined Conclusion: The ECG and SPECT imaging portions of the stress study are concordant with no evidence of stress induced myocardial ischemia. Left ventricular perfusion is normal.              HPI:   Hood Ojeda is a 78 y.o.year old with HTN, HL, chronic HFpEF, PACs, ARTURO treated with CPAP, PVD with bilateral carotid artery stenosis hemorrhagic lacunar CVA, PACs and pre diabetes.  An echocardiogram in February 2023 disclosed an EF of 70% with grade 2 DD, mild LVH, normal RV size and function mild LAE.  He follows with cardiologist Dr. Lara      February 2023 OV Dr. Lara  Lower extremity edema  Rx  spironolactone 12.5 mg daily; furosemide therapy 20 mg every other day.    Last OV Dr. Lara 4/5/2023  Per his note:  Weight  207 pounds  No change in medications  Cardiovascular lifestyle modifications recommended      11/2/23 JOSEPH English NP  Per her note:  Mr Hood Ojeda presents to our office with worsening fatigue and sleeping a lot.  His wife has noted worsening shortness of breath with exertion.  He denies CP, palpitations, lightheadedness or dizziness.   His wife has noted he is eating one meal a day, appetite is less and he is more fatigued.  Hood states he needs to take frequent rests when working or cutting the grass.  He notes he needs to take frequent deep breaths.    # Fatigue and shortness of breath, sleeping a lot, worsening activity tolerance with the need to rest frequently when working or cutting the grass.  Michael needs to take deep breaths due to fatigue. EKG NSR non specific ST wave abnormality.  He is not able to run on a treadmill due to fatigue and legs being weaker.  I have ordered an Rx Nuclear stress test R/O ischemia contributing to symptoms.    # Hypertension RUE sitting 144/64, continue on amlodipine 10 mg daily, losartan 25 mg daily, Aldactone 12.5 mg daily, 20 mg every other day DASH diet  # Dyslipidemia 9/19/23 , , HDL 55,LDL 74 continue on Lipitor 40 mg daily, heart healthy diet  # CVA with hemorrhagic thalamic CVA in 10/2021  # Carotid Artery Disease continue on Plavix 75 mg daily, Lipitor 40 mg daily  # ARTURO compliant with CPAP       8/3/24 ED Adm   CC: High BP.  Home systolic blood pressure reported to be in the 200s.  He advised to take an extra dose of losartan and be evaluated in the emergency room  Hs trop 17,16  EKG NSR. LAHB 63 bpm;2-unchanged with the exception of heart rate: repeat 54 bpm. No ischemic changes  ED /7      8/7/24  ED follow up re: BP reports feeling well without chest pain shortness of breath lightheadedness or dizziness.  No lower extremity edema.  No change in weight      He is accompanied by his wife, who confirms that he is at his baseline.  In the  morning he checks his blood pressure with a wrist cuff, before meds.  Sometimes the systolic is greater than 200.  Recently has been consuming high sodium such as Gatorade  OV BP  128/72   We discussed lifestyle modifications for people with hypertension particular, a low-salt diet.  Education was provided  He should split his amlodipine and losartan up to take 1 in the morning and 1 at night before bed.  He is to take his diuretics in the morning  He should take his blood pressure with an upper arm cuff that has been validated we discussed this.  He will send us a log next week of some home blood pressure readings, varied throughout the day         Assessment  Diagnoses and all orders for this visit:    Carotid stenosis, left    Primary hypertension    Chronic diastolic heart failure with preserved ejection fraction (HCC)    Obstructive sleep apnea treated with continuous positive airway pressure (CPAP)    Mixed hyperlipidemia        Past Medical History:   Diagnosis Date    Cancer (HCC)     prostate    Carotid stenosis     Hypertension     Left sided lacunar infarction (HCC) 12/30/2020       Review of Systems   Constitutional: Negative for chills.   Cardiovascular:  Negative for chest pain, claudication, cyanosis, dyspnea on exertion, irregular heartbeat, leg swelling, near-syncope, orthopnea, palpitations, paroxysmal nocturnal dyspnea and syncope.   Respiratory:  Negative for cough and shortness of breath.    Gastrointestinal:  Negative for heartburn and nausea.   Neurological:  Negative for dizziness, focal weakness, headaches, light-headedness and weakness.        Mild dysarthria and expressive aphasia   All other systems reviewed and are negative.      Allergies   Allergen Reactions    Ace Inhibitors Cough     .    Current Outpatient Medications:     acetaminophen (TYLENOL) 325 mg tablet, Take 650 mg by mouth every 6 (six) hours as needed for mild pain As needed, Disp: , Rfl:     amLODIPine (NORVASC) 10 mg  tablet, TAKE 1 TABLET BY MOUTH EVERY DAY, Disp: 90 tablet, Rfl: 1    atorvastatin (LIPITOR) 40 mg tablet, TAKE 1 TABLET BY MOUTH EVERY DAY WITH DINNER, Disp: 90 tablet, Rfl: 1    clopidogrel (PLAVIX) 75 mg tablet, TAKE 1 TABLET BY MOUTH EVERY DAY, Disp: 90 tablet, Rfl: 1    furosemide (LASIX) 20 mg tablet, Take 1 tablet (20 mg total) by mouth every other day Take it in the morning., Disp: 45 tablet, Rfl: 3    losartan (COZAAR) 25 mg tablet, TAKE 1 TABLET (25 MG TOTAL) BY MOUTH DAILY., Disp: 90 tablet, Rfl: 1    montelukast (SINGULAIR) 10 mg tablet, TAKE 1 TABLET BY MOUTH EVERYDAY AT BEDTIME, Disp: 90 tablet, Rfl: 1    spironolactone (ALDACTONE) 25 mg tablet, Take 0.5 tablets (12.5 mg total) by mouth daily, Disp: 45 tablet, Rfl: 3    Social History     Socioeconomic History    Marital status: /Civil Union     Spouse name: Not on file    Number of children: Not on file    Years of education: Not on file    Highest education level: Not on file   Occupational History    Not on file   Tobacco Use    Smoking status: Former    Smokeless tobacco: Never   Vaping Use    Vaping status: Never Used   Substance and Sexual Activity    Alcohol use: Yes     Alcohol/week: 4.0 standard drinks of alcohol     Types: 4 Cans of beer per week     Comment: several days a week    Drug use: Never    Sexual activity: Not on file   Other Topics Concern    Not on file   Social History Narrative    Not on file     Social Determinants of Health     Financial Resource Strain: Not on file   Food Insecurity: Not on file   Transportation Needs: Not on file   Physical Activity: Not on file   Stress: Not on file   Social Connections: Not on file   Intimate Partner Violence: Not on file   Housing Stability: Not on file       Family History   Problem Relation Age of Onset    Colon cancer Mother     No Known Problems Father     Leukemia Maternal Grandmother     Diabetes Maternal Grandmother     Alcohol abuse Maternal Grandfather     No Known  "Problems Paternal Grandmother     No Known Problems Paternal Grandfather        Physical Exam  Vitals and nursing note reviewed.   Constitutional:       General: He is not in acute distress.  HENT:      Head: Normocephalic and atraumatic.   Eyes:      Extraocular Movements: Extraocular movements intact.      Conjunctiva/sclera: Conjunctivae normal.   Cardiovascular:      Rate and Rhythm: Normal rate and regular rhythm.      Pulses: Intact distal pulses.      Heart sounds: Normal heart sounds.   Pulmonary:      Effort: Pulmonary effort is normal.      Breath sounds: Normal breath sounds.   Abdominal:      General: Bowel sounds are normal.      Palpations: Abdomen is soft.   Musculoskeletal:         General: Normal range of motion.      Cervical back: Normal range of motion and neck supple.   Skin:     General: Skin is warm and dry.   Neurological:      Mental Status: He is alert and oriented to person, place, and time.   Psychiatric:         Mood and Affect: Mood normal.         Vitals: Blood pressure 128/72, pulse 62, height 5' 10\" (1.778 m), weight 92.5 kg (204 lb), SpO2 96%.   Wt Readings from Last 3 Encounters:   24 92.5 kg (204 lb)   24 94 kg (207 lb 3.7 oz)   24 95.3 kg (210 lb 3.2 oz)         Labs & Results:  Lab Results   Component Value Date    WBC 7.21 2024    HGB 13.9 2024    HCT 43.2 2024    MCV 91 2024     2024     No results found for: \"BNP\"  No components found for: \"CHEM\"    Results for orders placed during the hospital encounter of 10/02/21    Echo complete with contrast if indicated    Narrative  [address] [address] [phone]    Transthoracic Echocardiogram  2D, M-mode, Doppler, and Color Doppler    Study date:  03-Oct-2021    Patient: RACHID LIMA  MR number: [id number]  Account number: [id number]  : 1945  Age: 76 years  Gender: Male  Status: Inpatient  Location: " Bedside  Height: 70 in  Weight: 205.9 lb  BP: 105/ 59 mmHg    Indications: Cerebral Infarction    Diagnoses: I63.9 - Cerebral infarction, unspecified    Sonographer:  Will Wiggins RDCS  Primary Physician:  Heber Cook DO  Referring Physician:  Archie Mansfield MD  Group:  [facility]  Interpreting Physician:  Alhaji Zhong MD    SUMMARY    LEFT VENTRICLE:  Systolic function was hyperdynamic by visual assessment. Ejection fraction was estimated to be 75 %.  There were no regional wall motion abnormalities.  Features were consistent with a pseudonormal left ventricular filling pattern, with concomitant abnormal relaxation and increased filling pressure (grade 2 diastolic dysfunction).    LEFT ATRIUM:  The atrium was moderately dilated.    RIGHT ATRIUM:  The atrium was mildly dilated.    MITRAL VALVE:  There was trace regurgitation.    AORTIC VALVE:  There was trace regurgitation.    TRICUSPID VALVE:  There was trace regurgitation.    IVC, HEPATIC VEINS:  The inferior vena cava was dilated.    HISTORY: PRIOR HISTORY: Hypertension, Prostate Cancer, PVC's    PROCEDURE: The procedure was performed at the bedside. This was a routine study. The transthoracic approach was used. The study included complete 2D imaging, M-mode, complete spectral Doppler, and color Doppler. The heart rate was 69 bpm,  at the start of the study. Images were obtained from the parasternal, apical, subcostal, and suprasternal notch acoustic windows. Image quality was adequate.    LEFT VENTRICLE: Size was normal. Systolic function was hyperdynamic by visual assessment. Ejection fraction was estimated to be 75 %. There were no regional wall motion abnormalities. Wall thickness was normal. DOPPLER: Features were  consistent with a pseudonormal left ventricular filling pattern, with concomitant abnormal relaxation and increased filling pressure (grade 2 diastolic dysfunction).    RIGHT VENTRICLE: The size was normal.  Systolic function was normal. Wall thickness was normal.    LEFT ATRIUM: The atrium was moderately dilated.    RIGHT ATRIUM: The atrium was mildly dilated.    MITRAL VALVE: Valve structure was normal. There was normal leaflet separation. DOPPLER: The transmitral velocity was within the normal range. There was no evidence for stenosis. There was trace regurgitation.    AORTIC VALVE: The valve was trileaflet. Leaflets exhibited normal thickness and normal cuspal separation. DOPPLER: Transaortic velocity was within the normal range. There was no evidence for stenosis. There was trace regurgitation.    TRICUSPID VALVE: The valve structure was normal. There was normal leaflet separation. DOPPLER: The transtricuspid velocity was within the normal range. There was no evidence for stenosis. There was trace regurgitation. Pulmonary artery  systolic pressure was at the upper limits of normal. Estimated peak PA pressure was 35 mmHg.    PULMONIC VALVE: Leaflets exhibited normal thickness, no calcification, and normal cuspal separation. DOPPLER: The transpulmonic velocity was within the normal range. There was no significant regurgitation.    PERICARDIUM: There was no pericardial effusion. The pericardium was normal in appearance.    AORTA: The root exhibited normal size.    SYSTEMIC VEINS: IVC: The inferior vena cava was dilated. Respirophasic changes were normal.    MEASUREMENT TABLES    OTHER ECHO MEASUREMENTS  (Reference normals)  Estimated CVP   10 mmHg   (--)    SYSTEM MEASUREMENT TABLES    2D  %FS: 24.66 %  Ao Diam: 3.71 cm  Ao asc: 3.43 cm  EDV(Teich): 60.63 ml  EF(Teich): 49.66 %  ESV(Teich): 30.52 ml  IVSd: 1.7 cm  LA Diam: 4.59 cm  LAAs A4C: 28.15 cm2  LAESV A-L A4C: 106.1 ml  LAESV MOD A4C: 102.28 ml  LALs A4C: 6.34 cm  LVEDV MOD A4C: 53.74 ml  LVEF MOD A4C: 57.21 %  LVESV MOD A4C: 22.99 ml  LVIDd: 3.77 cm  LVIDs: 2.84 cm  LVLd A4C: 7.98 cm  LVLs A4C: 6.98 cm  LVPWd: 1.31 cm  RAESV A-L: 57.71 ml  RAESV MOD: 53.66  ml  RALs: 6.51 cm  RVIDd: 2.87 cm  SV MOD A4C: 30.74 ml  SV(Teich): 30.11 ml    CW  AV Env.Ti: 312.08 ms  AV VTI: 25.01 cm  AV Vmax: 1.27 m/s  AV Vmean: 0.8 m/s  AV maxP.49 mmHg  AV meanPG: 3.19 mmHg  TR Vmax: 2.47 m/s  TR maxP.45 mmHg    MM  TAPSE: 2.62 cm    PW  E' Av.09 m/s  E' Lat: 0.11 m/s  E' Sept: 0.07 m/s  E/E' Avg: 10.3  E/E' Lat: 8.5  E/E' Sept: 13.08  LVOT Env.Ti: 274.02 ms  LVOT VTI: 20.88 cm  LVOT Vmax: 1.15 m/s  LVOT Vmean: 0.76 m/s  LVOT maxP.25 mmHg  LVOT meanP.75 mmHg  MV A Kyle: 0.45 m/s  MV Dec Preston: 4.43 m/s2  MV DecT: 208.05 ms  MV E Kyle: 0.92 m/s  MV E/A Ratio: 2.06  MV PHT: 60.33 ms  MVA By PHT: 3.65 cm2    [employer] Accredited Echocardiography Laboratory    Prepared and electronically signed by    Alhaji Zhong MD  Signed 03-Oct-2021 14:07:17    No results found for this or any previous visit.        This note was completed in part utilizing [employer] direct voice recognition software.   Grammatical errors, random word insertion, spelling mistakes, and incomplete sentences may be an occasional consequence of the system secondary to software limitations, ambient noise and hardware issues. At the time of dictation, efforts were made to edit, clarify and /or correct errors.  Please read the chart carefully and recognize, using context, where substitutions have occurred.  If you have any questions or concerns about the context, text or information contained within the body of this dictation, please contact myself, the provider, for further clarification

## 2024-08-07 ENCOUNTER — OFFICE VISIT (OUTPATIENT)
Dept: CARDIOLOGY CLINIC | Facility: CLINIC | Age: 79
End: 2024-08-07
Payer: COMMERCIAL

## 2024-08-07 VITALS
HEART RATE: 62 BPM | OXYGEN SATURATION: 96 % | BODY MASS INDEX: 29.2 KG/M2 | SYSTOLIC BLOOD PRESSURE: 128 MMHG | DIASTOLIC BLOOD PRESSURE: 72 MMHG | WEIGHT: 204 LBS | HEIGHT: 70 IN

## 2024-08-07 DIAGNOSIS — I65.22 CAROTID STENOSIS, LEFT: Primary | ICD-10-CM

## 2024-08-07 DIAGNOSIS — E78.2 MIXED HYPERLIPIDEMIA: ICD-10-CM

## 2024-08-07 DIAGNOSIS — I50.32 CHRONIC DIASTOLIC HEART FAILURE WITH PRESERVED EJECTION FRACTION (HCC): ICD-10-CM

## 2024-08-07 DIAGNOSIS — I10 PRIMARY HYPERTENSION: ICD-10-CM

## 2024-08-07 DIAGNOSIS — G47.33 OBSTRUCTIVE SLEEP APNEA TREATED WITH CONTINUOUS POSITIVE AIRWAY PRESSURE (CPAP): ICD-10-CM

## 2024-08-07 PROCEDURE — 99214 OFFICE O/P EST MOD 30 MIN: CPT | Performed by: NURSE PRACTITIONER

## 2024-08-07 NOTE — PATIENT INSTRUCTIONS
"Patient Education     DASH diet   The Basics   Written by the doctors and editors at [facility]   What is the DASH diet? -- DASH stands for \"dietary approaches to stop hypertension.\" It is an eating plan that can help lower blood pressure. It can also help prevent high blood pressure, which doctors call \"hypertension.\" You don't need special foods or recipes to follow the DASH diet. It is more about eating certain types of foods in certain amounts.  The DASH diet has lots of fruits and vegetables, whole grains, lean meats, healthy fats, and low-fat or fat-free dairy products (figure 1). It is low in saturated fats, trans fats, cholesterol, added sugars, and sodium (salt).  The standard DASH diet limits sodium to no more than 2300 mg a day. Your doctor or nurse can talk to you about what your specific goals should be.  Why do I need the DASH diet? -- The DASH diet can help you:   Lower your blood pressure and cholesterol   Lower your risk for cancer, heart disease, heart attack, and stroke. It might also lower your risk for heart failure, kidney stones, and diabetes.   Lose weight or keep a healthy weight  What can I eat and drink on the DASH diet? -- Below are some guidelines and examples for your daily and weekly nutrition goals. These are based on a 2000-calorie-per-day eating plan.  Daily goals:   Grains - Try to eat 6 to 8 servings of whole-grain, high-fiber foods each day. Examples of a serving include 1 slice of bread, 1 ounce (30 grams) of dry cereal, or 1/2 cup (120 grams) of cooked cereal, pasta, or brown rice.   Fruits - Try to eat 4 to 5 servings of fruit each day. Examples of a serving include 1 medium fruit or 1/2 cup (75 grams) of fresh, frozen, or canned fruit. Try to eat different kinds and colors. Frozen or canned fruit should not have added sugar. Look for frozen or canned fruits with 100 percent fruit juice or water.   Vegetables - Try to eat 4 to 5 servings of vegetables each day. Examples of a " "serving include 1 cup (40 grams) of leafy greens or 1/2 cup (75 grams) of fresh or cooked vegetables. Try to pick many kinds and colors. If you buy canned vegetables, look for \"low sodium\" or \"salt free.\" Buy plain, frozen vegetables to avoid added fat and sodium.   Dairy - Try to eat 2 to 3 servings of fat-free or low-fat milk products each day. Examples of a serving include 1 cup (240 mL) of milk or yogurt or 1.5 ounces (45 grams) of cheese.   Lean meats, poultry, and seafood - Try to eat 6 or fewer servings of lean meat, poultry, and seafood each day. Examples of a serving include 1 egg or 1 ounce (30 grams) of cooked meat, poultry, or fish. Try to choose more low-fat or lean meats like chicken, fish, or turkey. Eat less red meat.   Fats and oils - Try to eat 2 to 3 servings of fats and oils each day. Examples of a serving include 1 teaspoon (5 mL) of soft margarine or vegetable oil, or 1 tablespoon (18 grams) of mayonnaise. Eat healthy fats like those found in fish, nuts, and avocados. Try using olive oil or vegetable oils such as canola oil. You can also try corn, safflower, sunflower, or soybean oils. Use low-sodium and low-fat salad dressing and mayonnaise.  Weekly goals:   Nuts, seeds, and legumes (dry beans and peas) - Try to eat 4 to 5 servings each week. Examples of a serving include 1/3 cup (45 grams) of nuts, 2 tablespoons (50 grams) of nut butter or seeds, or 1/2 cup (75 grams) of cooked legumes. Try almonds and walnuts, sunflower seeds, peanut or other nut butters, soybeans, lentils, kidney beans, and split peas.   Sweets - Try to eat fewer than 5 servings each week. Examples of a serving include 1 tablespoon (14 grams) of sugar or jelly, or 1/2 cup (120 grams) of gelatin. Choose low-fat and trans fat-free desserts. These include fruit-flavored gelatin, sorbet, jellybeans, rodriguez crackers, animal crackers, low-fat fig bars, and vamshi snaps. Eat fruit to satisfy the desire for sweets.  To add flavor, " use pepper, herbs, spices, vinegar, or lemon or lime juices. Choose low-sodium or salt-free products whenever you can. This is especially important for foods like broths, soups, or soy sauce.  What foods and drinks should I avoid on the DASH diet?    Grains to avoid - Salted breads, rolls, crackers, quick breads, self-rising flours, biscuit mixes, regular breadcrumbs, instant hot cereals, commercially prepared rice, pasta, stuffing mixes.   Fruits and vegetables to avoid - Store-bought prepared potatoes and vegetable mixes, regular canned vegetables and juices, vegetables frozen with sauce, pickled vegetables, processed fruits with salt or sodium.   Dairy products to avoid - Whole milk, malted milk, chocolate milk, buttermilk, full-fat cheese, ice cream.   Meats to avoid - Smoked, cured, salted, or canned fish such as sardines or anchovies. High-fat cuts of meat like beef, lamb, pork, armenta and sausage, and chicken with the skin on it.   Fats and oils to avoid - Eat fewer solid fats like butter, lard, and hard stick margarine. Eat less saturated fat, trans fat, and total fat.   Condiments and snacks to avoid - Salted and canned peas, beans, and olives. Salted snack foods, fried foods, soda, other sweetened drinks.   Sweets to avoid - High-fat baked goods such as muffins, donuts, pastries, and commercial baked goods. Candy bars.   Alcohol - If you choose to drink alcohol, limit the amount. Most doctors recommend limiting alcohol to no more than 1 drink a day (for females) or 2 drinks a day (for males).  What else do I need to know?    Get regular physical activity to make this diet help you even more. Even gentle forms of activity, like walking, are good for your health.   Try baking or broiling instead of frying foods.   Write down the foods that you eat. This will help you track what you have eaten each week.   When you go to the grocery store, have a list or a meal plan. Don't shop when you are hungry, since this  might lead you to buy more unhealthy foods.   Read food labels with care (figure 2). They show you how much is in a serving. The amount is given as a percentage of the total amount that you need each day. Reading labels helps you make healthy food choices.  All topics are updated as new evidence becomes available and our peer review process is complete.  This topic retrieved from [employer] on: Feb 26, 2024.  Topic 311283 Version 1.0  Release: 32.2.4 - C32.56  © 2024 [employer] and/or its affiliates. All rights reserved.  figure 1: DASH diet     Graphic [id number] Version 1.0  figure 2: Food label     Graphic [id number] Version 1.0  Consumer Information Use and Disclaimer   Disclaimer: This generalized information is a limited summary of diagnosis, treatment, and/or medication information. It is not meant to be comprehensive and should be used as a tool to help the user understand and/or assess potential diagnostic and treatment options. It does NOT include all information about conditions, treatments, medications, side effects, or risks that may apply to a specific patient. It is not intended to be medical advice or a substitute for the medical advice, diagnosis, or treatment of a health care provider based on the health care provider's examination and assessment of a patient's specific and unique circumstances. Patients must speak with a health care provider for complete information about their health, medical questions, and treatment options, including any risks or benefits regarding use of medications. This information does not endorse any treatments or medications as safe, effective, or approved for treating a specific patient. [employer] and its affiliates disclaim any warranty or liability relating to this information or the use thereof.The use of this information is governed by the Terms of Use, available at https://www.[employer] Scientificuwer.com/en/know/clinical-effectiveness-terms. 2024© [employer] and its  affiliates and/or licensors. All rights reserved.  Copyright   © 2024 [employer] and/or its affiliates. All rights reserved.

## 2024-08-07 NOTE — LETTER
August 7, 2024     Timmy Camacho MD  [address]  [address]  [facility] [address]    Patient: Hood Ojeda   YOB: 1945   Date of Visit: 8/7/2024       Dear Dr. Camacho:    Thank you for referring Hood Ojeda to me for evaluation. Below are my notes for this consultation.    If you have questions, please do not hesitate to call me. I look forward to following your patient along with you.         Sincerely,        BIANCA Riggs        CC: MD Ofelia Fields CRNP  8/7/2024 11:38 AM  Sign when Signing Visit  Cardiology  ER  Follow Up Office Visit Note -     Hood Ojeda   78 y.o.   male   MRN: [id number]  [facility] [place]  [address]  [facility] [id number] [phone]    PCP: Timmy Camacho MD  Cardiologist : Dr Lara          Summary of Plan:  Low-sodium diet.  I do suspect some high sodium consumption of recent- ie gatorade.  He was educated about how to read a food label to facilitate adherence  He should take either losartan or amlodipine in the morning, and the other at night.  He should take his diuretics in the morning, as he is remote reporting high blood pressures  He was provided an educational handout about how to properly take home blood pressures  He should avoid NSAIDs, black licorice, decongestants, excess ETOH, > 7/wk, less than 2/d  Periodic home blood pressure monitoring with an upper arm cuff.  He currently has a wrist cuff which is not recommended.  Next week he should send us a log of his blood pressures through [employer]  Follow up will be scheduled with Dr Lara 6 months             Impression/plan  Hypertension, essential. /72. On, spironolactone 12.5 mg daily,Amlodipine 10 mg daily, losartan 25 mg daily, Lasix 20 mg every other day  DASH diet  Periodic home blood pressure monitoring, with a valid upper arm cuff which we discussed today  He should take either amlodipine  or losartan in the morning and the other 1 at night before he goes to bed.  Diuretics in the morning.    Chronic HFpEF  Wt Readings from Last 3 Encounters:   08/07/24 92.5 kg (204 lb)   08/03/24 94 kg (207 lb 3.7 oz)   05/07/24 95.3 kg (210 lb 3.2 oz)   --beta-blocker: None  --Diuretic: Lasix 20 mg every other day beginning January 9, 2024  --SGLT2i:  --2 g sodium diet, 1800 cc fluid restriction. Daily weights.   Hyperlipidemia. On atorvastatin 40 mg/d.  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 There was 22 hours 30 minutes of bradycardia. There was 5 minutes 42 seconds of tachycardia. Predominant rhythm was normal sinus. There was 1.6% supraventricular ectopy burden. There were 6 supraventricular runs with longest run of 5 beats and fastest being 150 bpm. There were 106 atrial pairs. Longest R-R interval was 1.8 seconds. There was no significant ventricular ectopy.  ECHOCARDIOGRAM 2/17/2023: EF 70%.  Grade 2 DD.  Mild LVH.  Normal RV size and function.  Mild LAE  SPECT 11/8/23 There are no perfusion defects. Stress Function: Left ventricular function post-stress is normal. Stress ejection fraction is 66 %. Stress Combined Conclusion: The ECG and SPECT imaging portions of the stress study are concordant with no evidence of stress induced myocardial ischemia. Left ventricular perfusion is  normal.              HPI:   Hood Ojeda is a 78 y.o.year old with HTN, HL, chronic HFpEF, PACs, ARTURO treated with CPAP, PVD with bilateral carotid artery stenosis hemorrhagic lacunar CVA, PACs and pre diabetes.  An echocardiogram in February 2023 disclosed an EF of 70% with grade 2 DD, mild LVH, normal RV size and function mild LAE.  He follows with cardiologist Dr. Lara      February 2023 OV Dr. Lara  Lower extremity edema  Rx  spironolactone 12.5 mg daily; furosemide therapy 20 mg every other day.    Last OV Dr. Lara 4/5/2023  Per his note:  Weight 207 pounds  No change in medications  Cardiovascular lifestyle modifications recommended      11/2/23 OV LUC English NP  Per her note:  Mr Hood Ojeda presents to our office with worsening fatigue and sleeping a lot.  His wife has noted worsening shortness of breath with exertion.  He denies CP, palpitations, lightheadedness or dizziness.   His wife has noted he is eating one meal a day, appetite is less and he is more fatigued.  Hood states he needs to take frequent rests when working or cutting the grass.  He notes he needs to take frequent deep breaths.    # Fatigue and shortness of breath, sleeping a lot, worsening activity tolerance with the need to rest frequently when working or cutting the grass.  Rodriguez needs to take deep breaths due to fatigue. EKG NSR non specific ST wave abnormality.  He is not able to run on a treadmill due to fatigue and legs being weaker.  I have ordered an Rx Nuclear stress test R/O ischemia contributing to symptoms.    # Hypertension RUE sitting 144/64, continue on amlodipine 10 mg daily, losartan 25 mg daily, Aldactone 12.5 mg daily, 20 mg every other day DASH diet  # Dyslipidemia 9/19/23 , , HDL 55,LDL 74 continue on Lipitor 40 mg daily, heart healthy diet  # CVA with hemorrhagic thalamic CVA in 10/2021  # Carotid Artery Disease continue on Plavix 75 mg daily, Lipitor 40 mg daily  # ARTURO compliant with CPAP        8/3/24 ED Adm   CC: High BP.  Home systolic blood pressure reported to be in the 200s.  He advised to take an extra dose of losartan and be evaluated in the emergency room  Hs trop 17,16  EKG NSR. LAHB 63 bpm;2-unchanged with the exception of heart rate: repeat 54 bpm. No ischemic changes  ED /7      8/7/24  ED follow up re: BP reports feeling well without chest pain shortness of breath lightheadedness or dizziness.  No lower extremity edema.  No change in weight      He is accompanied by his wife, who confirms that he is at his baseline.  In the morning he checks his blood pressure with a wrist cuff, before meds.  Sometimes the systolic is greater than 200.  Recently has been consuming high sodium such as Gatorade  OV BP  128/72   We discussed lifestyle modifications for people with hypertension particular, a low-salt diet.  Education was provided  He should split his amlodipine and losartan up to take 1 in the morning and 1 at night before bed.  He is to take his diuretics in the morning  He should take his blood pressure with an upper arm cuff that has been validated we discussed this.  He will send us a log next week of some home blood pressure readings, varied throughout the day         Assessment  Diagnoses and all orders for this visit:    Carotid stenosis, left    Primary hypertension    Chronic diastolic heart failure with preserved ejection fraction (HCC)    Obstructive sleep apnea treated with continuous positive airway pressure (CPAP)    Mixed hyperlipidemia        Past Medical History:   Diagnosis Date   • Cancer (HCC)     prostate   • Carotid stenosis    • Hypertension    • Left sided lacunar infarction (HCC) 12/30/2020       Review of Systems   Constitutional: Negative for chills.   Cardiovascular:  Negative for chest pain, claudication, cyanosis, dyspnea on exertion, irregular heartbeat, leg swelling, near-syncope, orthopnea, palpitations, paroxysmal nocturnal dyspnea and syncope.    Respiratory:  Negative for cough and shortness of breath.    Gastrointestinal:  Negative for heartburn and nausea.   Neurological:  Negative for dizziness, focal weakness, headaches, light-headedness and weakness.        Mild dysarthria and expressive aphasia   All other systems reviewed and are negative.      Allergies   Allergen Reactions   • Ace Inhibitors Cough     .    Current Outpatient Medications:   •  acetaminophen (TYLENOL) 325 mg tablet, Take 650 mg by mouth every 6 (six) hours as needed for mild pain As needed, Disp: , Rfl:   •  amLODIPine (NORVASC) 10 mg tablet, TAKE 1 TABLET BY MOUTH EVERY DAY, Disp: 90 tablet, Rfl: 1  •  atorvastatin (LIPITOR) 40 mg tablet, TAKE 1 TABLET BY MOUTH EVERY DAY WITH DINNER, Disp: 90 tablet, Rfl: 1  •  clopidogrel (PLAVIX) 75 mg tablet, TAKE 1 TABLET BY MOUTH EVERY DAY, Disp: 90 tablet, Rfl: 1  •  furosemide (LASIX) 20 mg tablet, Take 1 tablet (20 mg total) by mouth every other day Take it in the morning., Disp: 45 tablet, Rfl: 3  •  losartan (COZAAR) 25 mg tablet, TAKE 1 TABLET (25 MG TOTAL) BY MOUTH DAILY., Disp: 90 tablet, Rfl: 1  •  montelukast (SINGULAIR) 10 mg tablet, TAKE 1 TABLET BY MOUTH EVERYDAY AT BEDTIME, Disp: 90 tablet, Rfl: 1  •  spironolactone (ALDACTONE) 25 mg tablet, Take 0.5 tablets (12.5 mg total) by mouth daily, Disp: 45 tablet, Rfl: 3    Social History     Socioeconomic History   • Marital status: /Civil Union     Spouse name: Not on file   • Number of children: Not on file   • Years of education: Not on file   • Highest education level: Not on file   Occupational History   • Not on file   Tobacco Use   • Smoking status: Former   • Smokeless tobacco: Never   Vaping Use   • Vaping status: Never Used   Substance and Sexual Activity   • Alcohol use: Yes     Alcohol/week: 4.0 standard drinks of alcohol     Types: 4 Cans of beer per week     Comment: several days a week   • Drug use: Never   • Sexual activity: Not on file   Other Topics Concern   •  "Not on file   Social History Narrative   • Not on file     Social Determinants of Health     Financial Resource Strain: Not on file   Food Insecurity: Not on file   Transportation Needs: Not on file   Physical Activity: Not on file   Stress: Not on file   Social Connections: Not on file   Intimate Partner Violence: Not on file   Housing Stability: Not on file       Family History   Problem Relation Age of Onset   • Colon cancer Mother    • No Known Problems Father    • Leukemia Maternal Grandmother    • Diabetes Maternal Grandmother    • Alcohol abuse Maternal Grandfather    • No Known Problems Paternal Grandmother    • No Known Problems Paternal Grandfather        Physical Exam  Vitals and nursing note reviewed.   Constitutional:       General: He is not in acute distress.  HENT:      Head: Normocephalic and atraumatic.   Eyes:      Extraocular Movements: Extraocular movements intact.      Conjunctiva/sclera: Conjunctivae normal.   Cardiovascular:      Rate and Rhythm: Normal rate and regular rhythm.      Pulses: Intact distal pulses.      Heart sounds: Normal heart sounds.   Pulmonary:      Effort: Pulmonary effort is normal.      Breath sounds: Normal breath sounds.   Abdominal:      General: Bowel sounds are normal.      Palpations: Abdomen is soft.   Musculoskeletal:         General: Normal range of motion.      Cervical back: Normal range of motion and neck supple.   Skin:     General: Skin is warm and dry.   Neurological:      Mental Status: He is alert and oriented to person, place, and time.   Psychiatric:         Mood and Affect: Mood normal.         Vitals: Blood pressure 128/72, pulse 62, height 5' 10\" (1.778 m), weight 92.5 kg (204 lb), SpO2 96%.   Wt Readings from Last 3 Encounters:   08/07/24 92.5 kg (204 lb)   08/03/24 94 kg (207 lb 3.7 oz)   05/07/24 95.3 kg (210 lb 3.2 oz)         Labs & Results:  Lab Results   Component Value Date    WBC 7.21 08/03/2024    HGB 13.9 08/03/2024    HCT 43.2 " "2024    MCV 91 2024     2024     No results found for: \"BNP\"  No components found for: \"CHEM\"    Results for orders placed during the hospital encounter of 10/02/21    Echo complete with contrast if indicated    Narrative  [address] [address]  [phone]    Transthoracic Echocardiogram  2D, M-mode, Doppler, and Color Doppler    Study date:  03-Oct-2021    Patient: RACHID LIMA  MR number: [id number]  Account number: [id number]  : 1945  Age: 76 years  Gender: Male  Status: Inpatient  Location: Bedside  Height: 70 in  Weight: 205.9 lb  BP: 105/ 59 mmHg    Indications: Cerebral Infarction    Diagnoses: I63.9 - Cerebral infarction, unspecified    Sonographer:  Will Wiggins RDCS  Primary Physician:  Heber Cook DO  Referring Physician:  Archie Mansfield MD  Group:  [facility]  Interpreting Physician:  Alhaji Zhong MD    SUMMARY    LEFT VENTRICLE:  Systolic function was hyperdynamic by visual assessment. Ejection fraction was estimated to be 75 %.  There were no regional wall motion abnormalities.  Features were consistent with a pseudonormal left ventricular filling pattern, with concomitant abnormal relaxation and increased filling pressure (grade 2 diastolic dysfunction).    LEFT ATRIUM:  The atrium was moderately dilated.    RIGHT ATRIUM:  The atrium was mildly dilated.    MITRAL VALVE:  There was trace regurgitation.    AORTIC VALVE:  There was trace regurgitation.    TRICUSPID VALVE:  There was trace regurgitation.    IVC, HEPATIC VEINS:  The inferior vena cava was dilated.    HISTORY: PRIOR HISTORY: Hypertension, Prostate Cancer, PVC's    PROCEDURE: The procedure was performed at the bedside. This was a routine study. The transthoracic approach was used. The study included complete 2D imaging, M-mode, complete spectral Doppler, and color Doppler. The heart rate was 69 bpm,  at the " start of the study. Images were obtained from the parasternal, apical, subcostal, and suprasternal notch acoustic windows. Image quality was adequate.    LEFT VENTRICLE: Size was normal. Systolic function was hyperdynamic by visual assessment. Ejection fraction was estimated to be 75 %. There were no regional wall motion abnormalities. Wall thickness was normal. DOPPLER: Features were  consistent with a pseudonormal left ventricular filling pattern, with concomitant abnormal relaxation and increased filling pressure (grade 2 diastolic dysfunction).    RIGHT VENTRICLE: The size was normal. Systolic function was normal. Wall thickness was normal.    LEFT ATRIUM: The atrium was moderately dilated.    RIGHT ATRIUM: The atrium was mildly dilated.    MITRAL VALVE: Valve structure was normal. There was normal leaflet separation. DOPPLER: The transmitral velocity was within the normal range. There was no evidence for stenosis. There was trace regurgitation.    AORTIC VALVE: The valve was trileaflet. Leaflets exhibited normal thickness and normal cuspal separation. DOPPLER: Transaortic velocity was within the normal range. There was no evidence for stenosis. There was trace regurgitation.    TRICUSPID VALVE: The valve structure was normal. There was normal leaflet separation. DOPPLER: The transtricuspid velocity was within the normal range. There was no evidence for stenosis. There was trace regurgitation. Pulmonary artery  systolic pressure was at the upper limits of normal. Estimated peak PA pressure was 35 mmHg.    PULMONIC VALVE: Leaflets exhibited normal thickness, no calcification, and normal cuspal separation. DOPPLER: The transpulmonic velocity was within the normal range. There was no significant regurgitation.    PERICARDIUM: There was no pericardial effusion. The pericardium was normal in appearance.    AORTA: The root exhibited normal size.    SYSTEMIC VEINS: IVC: The inferior vena cava was dilated. Respirophasic  changes were normal.    MEASUREMENT TABLES    OTHER ECHO MEASUREMENTS  (Reference normals)  Estimated CVP   10 mmHg   (--)    SYSTEM MEASUREMENT TABLES    2D  %FS: 24.66 %  Ao Diam: 3.71 cm  Ao asc: 3.43 cm  EDV(Teich): 60.63 ml  EF(Teich): 49.66 %  ESV(Teich): 30.52 ml  IVSd: 1.7 cm  LA Diam: 4.59 cm  LAAs A4C: 28.15 cm2  LAESV A-L A4C: 106.1 ml  LAESV MOD A4C: 102.28 ml  LALs A4C: 6.34 cm  LVEDV MOD A4C: 53.74 ml  LVEF MOD A4C: 57.21 %  LVESV MOD A4C: 22.99 ml  LVIDd: 3.77 cm  LVIDs: 2.84 cm  LVLd A4C: 7.98 cm  LVLs A4C: 6.98 cm  LVPWd: 1.31 cm  RAESV A-L: 57.71 ml  RAESV MOD: 53.66 ml  RALs: 6.51 cm  RVIDd: 2.87 cm  SV MOD A4C: 30.74 ml  SV(Teich): 30.11 ml    CW  AV Env.Ti: 312.08 ms  AV VTI: 25.01 cm  AV Vmax: 1.27 m/s  AV Vmean: 0.8 m/s  AV maxP.49 mmHg  AV meanPG: 3.19 mmHg  TR Vmax: 2.47 m/s  TR maxP.45 mmHg    MM  TAPSE: 2.62 cm    PW  E' Av.09 m/s  E' Lat: 0.11 m/s  E' Sept: 0.07 m/s  E/E' Avg: 10.3  E/E' Lat: 8.5  E/E' Sept: 13.08  LVOT Env.Ti: 274.02 ms  LVOT VTI: 20.88 cm  LVOT Vmax: 1.15 m/s  LVOT Vmean: 0.76 m/s  LVOT maxP.25 mmHg  LVOT meanP.75 mmHg  MV A [place]: 0.45 m/s  MV Dec [place]: 4.43 m/s2  MV DecT: 208.05 ms  MV E [place]: 0.92 m/s  MV E/A Ratio: 2.06  MV PHT: 60.33 ms  MVA By PHT: 3.65 cm2    [employer] Commission Accredited Echocardiography Laboratory    Prepared and electronically signed by    Alhaji Zhong MD  Signed 03-Oct-2021 14:07:17    No results found for this or any previous visit.        This note was completed in part utilizing [employer] direct voice recognition software.   Grammatical errors, random word insertion, spelling mistakes, and incomplete sentences may be an occasional consequence of the system secondary to software limitations, ambient noise and hardware issues. At the time of dictation, efforts were made to edit, clarify and /or correct errors.  Please read the chart carefully and recognize, using context, where substitutions have occurred.   If you have any questions or concerns about the context, text or information contained within the body of this dictation, please contact myself, the provider, for further clarification

## 2024-08-12 ENCOUNTER — OFFICE VISIT (OUTPATIENT)
Dept: FAMILY MEDICINE CLINIC | Facility: CLINIC | Age: 79
End: 2024-08-12
Payer: COMMERCIAL

## 2024-08-12 VITALS
BODY MASS INDEX: 29.49 KG/M2 | RESPIRATION RATE: 16 BRPM | OXYGEN SATURATION: 99 % | HEIGHT: 70 IN | SYSTOLIC BLOOD PRESSURE: 138 MMHG | HEART RATE: 62 BPM | TEMPERATURE: 97.4 F | WEIGHT: 206 LBS | DIASTOLIC BLOOD PRESSURE: 70 MMHG

## 2024-08-12 DIAGNOSIS — E78.2 MIXED HYPERLIPIDEMIA: ICD-10-CM

## 2024-08-12 DIAGNOSIS — I65.22 CAROTID STENOSIS, LEFT: ICD-10-CM

## 2024-08-12 DIAGNOSIS — I10 PRIMARY HYPERTENSION: Primary | ICD-10-CM

## 2024-08-12 DIAGNOSIS — Z86.73 HISTORY OF STROKE: ICD-10-CM

## 2024-08-12 DIAGNOSIS — J31.0 CHRONIC RHINITIS: ICD-10-CM

## 2024-08-12 PROCEDURE — 99214 OFFICE O/P EST MOD 30 MIN: CPT | Performed by: FAMILY MEDICINE

## 2024-08-12 PROCEDURE — G2211 COMPLEX E/M VISIT ADD ON: HCPCS | Performed by: FAMILY MEDICINE

## 2024-08-12 NOTE — PROGRESS NOTES
Ambulatory Visit  Name: Hood Ojeda      : 1945      MRN: [id number]  Encounter Provider: Timmy Camacho MD  Encounter Date: 2024   Encounter department: [facility]    Assessment & Plan     Pressure stable today 138/70, continue BP regimen as prescribed.  Continue Plavix for history of lacunar stroke.  Continue statin for hyperlipidemia.  Doing well with his chronic rhinitis.  RTC as needed    1. Primary hypertension  2. Carotid stenosis, left  3. Mixed hyperlipidemia  4. Chronic rhinitis  5. thalamic hemorrhage history of lacunar stroke       History of Present Illness     Presents today for an ER follow-up due to elevated blood pressures.  He states to me that he was reading his blood pressure screen wrong and it was actually upside down.  Denies any symptoms at this time.  He is doing well.  No new complaints compliant with his medications        Review of Systems   Constitutional:  Negative for activity change, appetite change, chills, fatigue and fever.   HENT:  Negative for congestion, ear pain, rhinorrhea, sneezing and sore throat.    Eyes:  Negative for pain, discharge, redness, itching and visual disturbance.   Respiratory:  Negative for cough, chest tightness, shortness of breath and wheezing.    Cardiovascular:  Negative for chest pain and palpitations.   Gastrointestinal:  Negative for abdominal pain, constipation, diarrhea, nausea and vomiting.   Genitourinary:  Negative for dysuria and hematuria.   Musculoskeletal:  Negative for arthralgias, back pain, gait problem, myalgias and neck pain.   Skin:  Negative for color change and rash.   Neurological:  Negative for dizziness, weakness, numbness and headaches.   Hematological:  Negative for adenopathy.   Psychiatric/Behavioral:  Negative for confusion and dysphoric mood. The patient is not nervous/anxious.        Objective     /70   Pulse 62   Temp (!) 97.4 °F (36.3 °C) (Temporal)   Resp 16   Ht 5'  "10\" (1.778 m)   Wt 93.4 kg (206 lb)   SpO2 99%   BMI 29.56 kg/m²     Physical Exam  Vitals reviewed.   Constitutional:       General: He is not in acute distress.     Appearance: Normal appearance. He is well-developed.   HENT:      Head: Normocephalic and atraumatic.      Right Ear: External ear normal.      Left Ear: External ear normal.      Nose: Nose normal.      Mouth/Throat:      Mouth: Mucous membranes are moist.   Eyes:      General: No scleral icterus.        Right eye: No discharge.         Left eye: No discharge.      Conjunctiva/sclera: Conjunctivae normal.   Cardiovascular:      Rate and Rhythm: Normal rate and regular rhythm.      Pulses: Normal pulses.      Heart sounds: Normal heart sounds. No murmur heard.  Pulmonary:      Effort: Pulmonary effort is normal. No respiratory distress.      Breath sounds: Normal breath sounds. No wheezing.   Abdominal:      General: There is no distension.      Palpations: Abdomen is soft. There is no mass.      Tenderness: There is no abdominal tenderness.      Hernia: No hernia is present.   Musculoskeletal:         General: No swelling, tenderness, deformity or signs of injury. Normal range of motion.      Cervical back: Normal range of motion.   Skin:     General: Skin is warm and dry.      Capillary Refill: Capillary refill takes less than 2 seconds.      Findings: No lesion or rash.   Neurological:      General: No focal deficit present.      Mental Status: He is alert. Mental status is at baseline.      Motor: No weakness.      Gait: Gait normal.   Psychiatric:         Mood and Affect: Mood normal.         Behavior: Behavior normal.       Administrative Statements           "

## 2024-09-18 DIAGNOSIS — I10 PRIMARY HYPERTENSION: ICD-10-CM

## 2024-09-18 RX ORDER — LOSARTAN POTASSIUM 25 MG/1
25 TABLET ORAL DAILY
Qty: 90 TABLET | Refills: 1 | Status: SHIPPED | OUTPATIENT
Start: 2024-09-18

## 2024-09-19 ENCOUNTER — OFFICE VISIT (OUTPATIENT)
Dept: FAMILY MEDICINE CLINIC | Facility: CLINIC | Age: 79
End: 2024-09-19
Payer: COMMERCIAL

## 2024-09-19 VITALS
DIASTOLIC BLOOD PRESSURE: 78 MMHG | OXYGEN SATURATION: 97 % | SYSTOLIC BLOOD PRESSURE: 122 MMHG | HEART RATE: 65 BPM | TEMPERATURE: 97.6 F | BODY MASS INDEX: 28.77 KG/M2 | WEIGHT: 201 LBS | HEIGHT: 70 IN

## 2024-09-19 DIAGNOSIS — F33.9 DEPRESSION, RECURRENT (HCC): Primary | ICD-10-CM

## 2024-09-19 DIAGNOSIS — M25.542 ARTHRALGIA OF BOTH HANDS: ICD-10-CM

## 2024-09-19 DIAGNOSIS — Z23 ENCOUNTER FOR IMMUNIZATION: ICD-10-CM

## 2024-09-19 DIAGNOSIS — I10 PRIMARY HYPERTENSION: ICD-10-CM

## 2024-09-19 DIAGNOSIS — E78.2 MIXED HYPERLIPIDEMIA: ICD-10-CM

## 2024-09-19 DIAGNOSIS — M25.541 ARTHRALGIA OF BOTH HANDS: ICD-10-CM

## 2024-09-19 DIAGNOSIS — R73.03 PREDIABETES: ICD-10-CM

## 2024-09-19 DIAGNOSIS — H69.92 DYSFUNCTION OF LEFT EUSTACHIAN TUBE: ICD-10-CM

## 2024-09-19 DIAGNOSIS — L98.9 SKIN LESIONS: ICD-10-CM

## 2024-09-19 PROCEDURE — 99214 OFFICE O/P EST MOD 30 MIN: CPT | Performed by: FAMILY MEDICINE

## 2024-09-19 PROCEDURE — 90662 IIV NO PRSV INCREASED AG IM: CPT

## 2024-09-19 PROCEDURE — G2211 COMPLEX E/M VISIT ADD ON: HCPCS | Performed by: FAMILY MEDICINE

## 2024-09-19 PROCEDURE — G0008 ADMIN INFLUENZA VIRUS VAC: HCPCS

## 2024-09-19 NOTE — ASSESSMENT & PLAN NOTE
Depression Screening Follow-up Plan: Patient's depression screening was positive with a PHQ-9 score of 12.   States that he is doing well without medication, we will continue to discuss at upcoming visits

## 2024-09-19 NOTE — PROGRESS NOTES
Ambulatory Visit  Name: Hood Ojeda      : 1945      MRN: [id number]  Encounter Provider: Timmy Camacho MD  Encounter Date: 2024   Encounter department: [facility]ARE    Assessment & Plan  Depression, recurrent (HCC)  Depression Screening Follow-up Plan: Patient's depression screening was positive with a PHQ-9 score of 12.   States that he is doing well without medication, we will continue to discuss at upcoming visits       Mixed hyperlipidemia  Continue Lipitor 40 mg, work on diet and exercise.  Recheck labs  Orders:    Lipid panel; Future    Comprehensive metabolic panel; Future    Prediabetes  Continue to work on diet and exercise, recheck labs  Orders:    Hemoglobin A1C; Future    Primary hypertension  Blood pressure well-controlled today 122/78, continue motor pain 10 mg as well as losartan 25 mg       Dysfunction of left eustachian tube  Recommend patient to start over-the-counter antihistamine such as Claritin or Allegra.  Continue Singulair       Arthralgia of both hands  Check x-rays of bilateral hands  Orders:    XR hand 3+ vw right; Future    XR hand 3+ vw left; Future    Skin lesions  Refer to dermatology for skin surveillance  Orders:    Ambulatory Referral to Dermatology; Future      Depression Screening and Follow-up Plan: Patient's depression screening was positive with a PHQ-9 score of 12. Patient advised to follow-up with PCP for further management.       History of Present Illness     Presents today for follow-up on hyperlipidemia, prediabetes, hypertension.  States he has a history of dysfunction of his left eustachian tube.  He was seen by ENT in the past and the recommended tubes to be placed however he chose not to do that.  Does complain of arthralgias of his hands today.  Some lesions on his skin, has not seen a dermatologist before.          Review of Systems   Constitutional:  Negative for activity change, appetite change, chills, fatigue and  "fever.   HENT:  Negative for congestion, rhinorrhea, sneezing and sore throat.    Eyes:  Negative for pain, discharge, redness and itching.   Respiratory:  Positive for cough. Negative for chest tightness, shortness of breath and wheezing.    Cardiovascular:  Negative for chest pain and palpitations.   Gastrointestinal:  Negative for abdominal pain, constipation, diarrhea, nausea and vomiting.   Musculoskeletal:  Positive for arthralgias and myalgias. Negative for gait problem and neck pain.   Skin:  Negative for rash.   Neurological:  Negative for dizziness, weakness, numbness and headaches.   Hematological:  Negative for adenopathy.   Psychiatric/Behavioral:  Negative for confusion and dysphoric mood. The patient is not nervous/anxious.    All other systems reviewed and are negative.          Objective     /78   Pulse 65   Temp 97.6 °F (36.4 °C)   Ht 5' 10\" (1.778 m)   Wt 91.2 kg (201 lb)   SpO2 97%   BMI 28.84 kg/m²     Physical Exam  Vitals reviewed.   Constitutional:       General: He is not in acute distress.     Appearance: Normal appearance. He is well-developed.   HENT:      Head: Normocephalic and atraumatic.      Right Ear: External ear normal.      Left Ear: External ear normal.      Nose: Nose normal.      Mouth/Throat:      Mouth: Mucous membranes are moist.   Eyes:      General: No scleral icterus.        Right eye: No discharge.         Left eye: No discharge.      Conjunctiva/sclera: Conjunctivae normal.   Cardiovascular:      Rate and Rhythm: Normal rate and regular rhythm.      Pulses: Normal pulses.      Heart sounds: Normal heart sounds. No murmur heard.  Pulmonary:      Effort: Pulmonary effort is normal. No respiratory distress.      Breath sounds: Normal breath sounds. No wheezing.   Abdominal:      General: There is no distension.      Palpations: Abdomen is soft. There is no mass.      Tenderness: There is no abdominal tenderness.      Hernia: No hernia is present. "   Musculoskeletal:         General: Swelling (bilateral hands) present. No tenderness, deformity or signs of injury. Normal range of motion.      Cervical back: Normal range of motion.   Skin:     General: Skin is warm and dry.      Capillary Refill: Capillary refill takes less than 2 seconds.      Findings: Lesion (multiple moles throughout arms and chest) present. No rash.   Neurological:      General: No focal deficit present.      Mental Status: He is alert. Mental status is at baseline.      Motor: No weakness.      Gait: Gait normal.   Psychiatric:         Mood and Affect: Mood normal.         Behavior: Behavior normal.         Depression Screening Follow-up Plan: Patient's depression screening was positive with a PHQ-2 score of . Their PHQ-9 score was 12. Patient advised to follow-up with PCP for further management.

## 2024-09-19 NOTE — PATIENT INSTRUCTIONS
"Patient Education     Depression in adults   The Basics   Written by the doctors and editors at [facility]   What is depression? -- Depression is a disorder that makes you sad, but it is different from normal sadness. Depression can make it hard for you to work, study, or do everyday tasks.  What causes depression? -- Depression is caused by problems with chemicals in the brain called \"neurotransmitters.\" Some people might be more likely to have depression if it runs in their family. Other things might also play a role, including hormones, certain health problems, medicines, stress, being mistreated as a child, family problems, and problems with friends or at school or work.  How do I know if I am depressed? -- People with depression feel down most of the time for at least 2 weeks. They also have at least 1 of these 2 symptoms:   They no longer enjoy or care about doing the things that they used to like to do.   They feel sad, down, hopeless, or cranky most of the day, almost every day.  People with depression can also have other symptoms. Examples include:   Changes in your appetite or weight. You might eat too little or too much, or gain or lose weight without trying.   Sleeping too much or too little   Feeling tired or like you have no energy   Feeling guilty, helpless, or like you are worth nothing   Trouble with concentration or memory   Acting restless or have trouble staying still, or moving or speaking more slowly than normal   Repeated thoughts of death or killing yourself  If you think that you might be depressed, see your doctor or nurse. Only someone trained in mental health can tell for sure if you are depressed.  How is depression diagnosed? -- Your doctor or nurse will do a physical exam, ask you questions, and might order tests. Depression can have a big impact on your life. Luckily, depression can be treated, and the sooner treatment is started, the better it works.  Get help right away if you are " "thinking of hurting or killing yourself! -- If you ever feel like you might hurt yourself or someone else, help is available:   In the [place], contact the 262 Suicide & Crisis Lifeline:   To speak to someone, call or text 770.   To talk to someone online, go to www.[facility].org/chat.   Call your doctor or nurse, and tell them it is urgent.   Call for an ambulance (in the [place] and [place], call 9-1-1).   Go to the emergency department at the nearest hospital.  What are the treatments for depression? -- Your doctor or nurse will work with you to make a treatment plan. Treatment can include:   Helping you learn more about depression   Counseling (with a psychiatrist, psychologist, nurse, or )   Medicines that relieve depression   Creating a plan to limit access to items that you might use to harm yourself   Other treatments that pass magnetic waves or electricity into the brain  In addition to treatment, getting regular physical activity can also help you feel better.  People with depression that is not too severe can get better by taking medicines or talking with a counselor. People with severe depression usually need medicines to get better, and might also need to see a counselor.  Another treatment involves placing a device against the scalp to pass magnetic waves into the brain. This is called \"transcranial magnetic stimulation\" (\"TMS\"). Doctors might suggest TMS if medicines and counseling have not helped.  Some people with severe depression might need a treatment called \"electroconvulsive therapy\" (\"ECT\"). During ECT, doctors pass an electric current through a person's brain in a safe way.  When will I feel better? -- Most treatment options take a little while to start working.   Many people who take medicines start to feel better within 2 weeks, but it might be 4 to 8 weeks before the medicine has its full effect.   Many people who see a counselor start to feel better within a few weeks, but it might " take 8 to 10 weeks to get the greatest benefit.  If the first treatment you try does not help you, tell your doctor or nurse, but do not give up. Some people need to try different treatments or combinations of treatments before they find an approach that works. Your doctor, nurse, or counselor can work with you to find the treatment that is right for you. They can also help you figure out how to cope while you search for the right treatment or are waiting for your treatment to start working.  How do I decide which treatment to have? -- You and your doctor or nurse will need to work together to choose a treatment for you. Medicines might work a little faster than counseling. But medicines can also cause side effects. Plus, some people do not like the idea of taking medicine.  Seeing a counselor involves talking about your feelings. That is also hard for some people.  What if I take medicine for depression and I want to have a baby? -- Some depression medicines can cause problems for an unborn baby. But having untreated depression during pregnancy can also cause problems. If you want to get pregnant, tell your doctor but do not stop taking your medicines. Together, you can plan the safest way for you to have your baby.  It's also important to talk with your doctor if you want to breastfeed after your baby is born. Breastfeeding has lots of benefits for both mother and baby. Some depression medicines are safer than others to use while breastfeeding. But having untreated depression after giving birth can also cause problems, so do not stop taking your medicines. Your doctor can work with you to plan the safest way for you to feed your baby.  All topics are updated as new evidence becomes available and our peer review process is complete.  This topic retrieved from [employer] on: Mar 06, 2024.  Topic 41883 Version 22.0  Release: 32.2.4 - C32.64  © 2024 UpToDate, Inc. and/or its affiliates. All rights reserved.  figure 1:  "Mood disorders caused by problems in the brain     Mooddisorders, such as depression and bipolar disorder, are caused by problems with\"neurotransmitters.\" These are chemicals in the brain that can affect your emotions.Treatments for mood disorders seem to work by changing the levels of certainneurotransmitters.  Graphic 36174 Version 4.0  Consumer Information Use and Disclaimer   Disclaimer: This generalized information is a limited summary of diagnosis, treatment, and/or medication information. It is not meant to be comprehensive and should be used as a tool to help the user understand and/or assess potential diagnostic and treatment options. It does NOT include all information about conditions, treatments, medications, side effects, or risks that may apply to a specific patient. It is not intended to be medical advice or a substitute for the medical advice, diagnosis, or treatment of a health care provider based on the health care provider's examination and assessment of a patient's specific and unique circumstances. Patients must speak with a health care provider for complete information about their health, medical questions, and treatment options, including any risks or benefits regarding use of medications. This information does not endorse any treatments or medications as safe, effective, or approved for treating a specific patient. UpToDate, Inc. and its affiliates disclaim any warranty or liability relating to this information or the use thereof.The use of this information is governed by the Terms of Use, available at https://www.[employer].com/en/know/clinical-effectiveness-terms. 2024© UpToDate, Inc. and its affiliates and/or licensors. All rights reserved.  Copyright   © 2024 UpToDate, Inc. and/or its affiliates. All rights reserved.    "

## 2024-09-19 NOTE — ASSESSMENT & PLAN NOTE
Continue Lipitor 40 mg, work on diet and exercise.  Recheck labs  Orders:    Lipid panel; Future    Comprehensive metabolic panel; Future

## 2024-09-19 NOTE — ASSESSMENT & PLAN NOTE
Blood pressure well-controlled today 122/78, continue motor pain 10 mg as well as losartan 25 mg

## 2024-10-03 ENCOUNTER — NURSE TRIAGE (OUTPATIENT)
Age: 79
End: 2024-10-03

## 2024-10-03 NOTE — TELEPHONE ENCOUNTER
Spoke with spouse to better understand condition.     SOB when walking only, no issues with speaking and SOB, difficulty with laying flat. Has swelling in feet an ankles more and more daily, however goes down at night, better in AM and then repeats.     Wife wondering who to see or talk to. Recommended a phone call to cardiology. Based on information he may need seen in ED. Wife in agreement to call cardiology and speak with them. She does not feel he is bad enough for ED.     Cancelling 10/4 appt.

## 2024-10-03 NOTE — TELEPHONE ENCOUNTER
"Patient with some increase in SOB,     Reason for Disposition   MILD longstanding difficulty breathing (e.g., speaks in phrases, SOB even at rest, pulse 100-120) and SAME as normal    Answer Assessment - Initial Assessment Questions  1. RESPIRATORY STATUS: \"Describe your breathing?\" (e.g., wheezing, shortness of breath, unable to speak, severe coughing)       SOB with exertion  2. ONSET: \"When did this breathing problem begin?\"       Few days  3. PATTERN \"Does the difficult breathing come and go, or has it been constant since it started?\"       Comes and goes  4. SEVERITY: \"How bad is your breathing?\" (e.g., mild, moderate, severe)     - MILD: No SOB at rest, mild SOB with walking, speaks normally in sentences, can lay down, no retractions, pulse < 100.     - MODERATE: SOB at rest, SOB with minimal exertion and prefers to sit, cannot lie down flat, speaks in phrases, mild retractions, audible wheezing, pulse 100-120.     - SEVERE: Very SOB at rest, speaks in single words, struggling to breathe, sitting hunched forward, retractions, pulse > 120       moderate  5. RECURRENT SYMPTOM: \"Have you had difficulty breathing before?\" If Yes, ask: \"When was the last time?\" and \"What happened that time?\"       Yes- has a hx of CHF  6. CARDIAC HISTORY: \"Do you have any history of heart disease?\" (e.g., heart attack, angina, bypass surgery, angioplasty)       CHF  7. LUNG HISTORY: \"Do you have any history of lung disease?\"  (e.g., pulmonary embolus, asthma, emphysema)      Sleep apnea  8. CAUSE: \"What do you think is causing the breathing problem?\"       ? CHF  9. OTHER SYMPTOMS: \"Do you have any other symptoms? (e.g., dizziness, runny nose, cough, chest pain, fever)      Swelling feet and ankles to tibial area  10. PREGNANCY: \"Is there any chance you are pregnant?\" \"When was your last menstrual period?\"        N/A -male  11. TRAVEL: \"Have you traveled out of the country in the last month?\" (e.g., travel history, exposures)       "  N/A    Protocols used: Breathing Difficulty-ADULT-[place]

## 2024-10-16 DIAGNOSIS — I61.9 HEMORRHAGIC STROKE (HCC): ICD-10-CM

## 2024-10-16 DIAGNOSIS — I10 HYPERTENSION: ICD-10-CM

## 2024-10-16 RX ORDER — AMLODIPINE BESYLATE 10 MG/1
TABLET ORAL
Qty: 90 TABLET | Refills: 1 | Status: SHIPPED | OUTPATIENT
Start: 2024-10-16

## 2024-10-20 DIAGNOSIS — I50.32 CHRONIC DIASTOLIC HEART FAILURE WITH PRESERVED EJECTION FRACTION (HCC): ICD-10-CM

## 2024-10-22 RX ORDER — FUROSEMIDE 20 MG/1
20 TABLET ORAL EVERY OTHER DAY
Qty: 45 TABLET | Refills: 1 | Status: SHIPPED | OUTPATIENT
Start: 2024-10-22

## 2024-10-22 RX ORDER — SPIRONOLACTONE 25 MG/1
12.5 TABLET ORAL DAILY
Qty: 45 TABLET | Refills: 1 | Status: SHIPPED | OUTPATIENT
Start: 2024-10-22

## 2024-11-08 DIAGNOSIS — J31.0 CHRONIC RHINITIS: ICD-10-CM

## 2024-11-08 RX ORDER — MONTELUKAST SODIUM 10 MG/1
10 TABLET ORAL
Qty: 90 TABLET | Refills: 1 | Status: SHIPPED | OUTPATIENT
Start: 2024-11-08

## 2024-11-13 ENCOUNTER — OFFICE VISIT (OUTPATIENT)
Dept: FAMILY MEDICINE CLINIC | Facility: CLINIC | Age: 79
End: 2024-11-13
Payer: COMMERCIAL

## 2024-11-13 VITALS
SYSTOLIC BLOOD PRESSURE: 128 MMHG | TEMPERATURE: 97.8 F | WEIGHT: 213 LBS | OXYGEN SATURATION: 97 % | RESPIRATION RATE: 20 BRPM | BODY MASS INDEX: 30.49 KG/M2 | DIASTOLIC BLOOD PRESSURE: 70 MMHG | HEART RATE: 62 BPM | HEIGHT: 70 IN

## 2024-11-13 DIAGNOSIS — I87.8 VENOUS STASIS: Primary | ICD-10-CM

## 2024-11-13 PROCEDURE — 99213 OFFICE O/P EST LOW 20 MIN: CPT | Performed by: FAMILY MEDICINE

## 2024-11-13 PROCEDURE — G2211 COMPLEX E/M VISIT ADD ON: HCPCS | Performed by: FAMILY MEDICINE

## 2024-11-13 NOTE — PROGRESS NOTES
"Name: Hood Ojeda      : 1945      MRN: [id number]  Encounter Provider: Timmy Camacho MD  Encounter Date: 2024   Encounter department: [facility] PRIMARY CARE  :  Assessment & Plan  Venous stasis  Seems to be consistent with venous stasis.  Mostly improved at this time.  I do recommend patient to keep his legs elevated and try compression stockings.  If there is no improvement he will call us back in a few weeks           History of Present Illness   He presents today alongside his wife to discuss a rash on his bilateral legs with some swelling.  States that it has mostly improved on its own.  Unsure of where it came from.    Rash  Pertinent negatives include no congestion, cough, diarrhea, fatigue, fever, rhinorrhea, shortness of breath, sore throat or vomiting.         Review of Systems   Constitutional:  Negative for activity change, appetite change, chills, fatigue and fever.   HENT:  Negative for congestion, rhinorrhea, sneezing and sore throat.    Eyes:  Negative for pain, discharge, redness and itching.   Respiratory:  Negative for cough, chest tightness, shortness of breath and wheezing.    Cardiovascular:  Negative for chest pain and palpitations.   Gastrointestinal:  Negative for abdominal pain, constipation, diarrhea, nausea and vomiting.   Musculoskeletal:  Negative for arthralgias, gait problem, myalgias and neck pain.   Skin:  Positive for rash.   Neurological:  Negative for dizziness, weakness, numbness and headaches.   Hematological:  Negative for adenopathy.   Psychiatric/Behavioral:  Negative for confusion and dysphoric mood. The patient is not nervous/anxious.    All other systems reviewed and are negative.         Objective   /70   Pulse 62   Temp 97.8 °F (36.6 °C) (Temporal)   Resp 20   Ht 5' 10\" (1.778 m)   Wt 96.6 kg (213 lb)   SpO2 97%   BMI 30.56 kg/m²      Physical Exam  Vitals reviewed.   Constitutional:       General: He is not in acute " distress.     Appearance: Normal appearance. He is well-developed.   HENT:      Head: Normocephalic and atraumatic.      Right Ear: External ear normal.      Left Ear: External ear normal.      Nose: Nose normal.      Mouth/Throat:      Mouth: Mucous membranes are moist.   Eyes:      General: No scleral icterus.        Right eye: No discharge.         Left eye: No discharge.      Conjunctiva/sclera: Conjunctivae normal.   Cardiovascular:      Rate and Rhythm: Normal rate and regular rhythm.      Pulses: Normal pulses.      Heart sounds: Normal heart sounds. No murmur heard.  Pulmonary:      Effort: Pulmonary effort is normal. No respiratory distress.      Breath sounds: Normal breath sounds. No wheezing.   Abdominal:      General: There is no distension.      Palpations: Abdomen is soft. There is no mass.      Tenderness: There is no abdominal tenderness.      Hernia: No hernia is present.   Musculoskeletal:         General: No swelling, tenderness, deformity or signs of injury. Normal range of motion.      Cervical back: Normal range of motion.      Right lower leg: Edema present.      Left lower leg: Edema present.   Skin:     General: Skin is warm and dry.      Capillary Refill: Capillary refill takes less than 2 seconds.      Findings: Erythema (left lower extremity) present. No lesion or rash.   Neurological:      General: No focal deficit present.      Mental Status: He is alert. Mental status is at baseline.      Motor: No weakness.      Gait: Gait normal.   Psychiatric:         Mood and Affect: Mood normal.         Behavior: Behavior normal.

## 2024-12-03 ENCOUNTER — HOSPITAL ENCOUNTER (OUTPATIENT)
Dept: NON INVASIVE DIAGNOSTICS | Facility: CLINIC | Age: 79
Discharge: HOME | End: 2024-12-03
Payer: COMMERCIAL

## 2024-12-03 DIAGNOSIS — I65.22 ASYMPTOMATIC STENOSIS OF LEFT CAROTID ARTERY: ICD-10-CM

## 2024-12-03 DIAGNOSIS — I65.21 CAROTID OCCLUSION, RIGHT: ICD-10-CM

## 2024-12-03 PROCEDURE — 93880 EXTRACRANIAL BILAT STUDY: CPT | Performed by: SURGERY

## 2024-12-03 PROCEDURE — 93880 EXTRACRANIAL BILAT STUDY: CPT

## 2024-12-07 ENCOUNTER — RESULTS FOLLOW-UP (OUTPATIENT)
Dept: NEUROLOGY | Facility: CLINIC | Age: 79
End: 2024-12-07

## 2024-12-07 DIAGNOSIS — I77.9 BILATERAL CAROTID ARTERY DISEASE, UNSPECIFIED TYPE (HCC): Primary | ICD-10-CM

## 2024-12-07 DIAGNOSIS — I65.22 CAROTID STENOSIS, LEFT: ICD-10-CM

## 2024-12-09 NOTE — TELEPHONE ENCOUNTER
Inbound call received from Patient's wife to follow up on voicemail. Consent on chart is not complete, date is missing. Patient was present and gave verbal consent to speak to his wife. Patient wife provided Dr. Kothari's advisement and she verbalized understanding. Patient wife also referred to the Vascular provider and cardiology for further guidance. Study was ordered by Dr. Mckinley.     Patient wife and Patient do not remember taking lexapro or how it went if the Patient did as it was too long ago it was discussed.     Patient last saw Dr. Kothari 10/11/2022 and he is not currently interested in seeing Dr. Kothari again but will if the cardiologist suggests it. Patient wife will ask cardiology if they still want him to see neurology.     Dr. Kothari please be aware, thank you!

## 2024-12-09 NOTE — TELEPHONE ENCOUNTER
Joe Kothari MD to Neurology [facility]       12/7/24  4:40 PM  Result Note  Please call Ed, The carotid looks ok, one side is still closed as expected and the other has only mild narrowing.  This should be rechecked in about a year which I will order for him. Also, did he get started on the Lexapro and how is he doing on that, any side effects?  Has it helped? Thanks

## 2024-12-18 ENCOUNTER — OFFICE VISIT (OUTPATIENT)
Dept: FAMILY MEDICINE CLINIC | Facility: CLINIC | Age: 79
End: 2024-12-18
Payer: COMMERCIAL

## 2024-12-18 VITALS
TEMPERATURE: 97.2 F | BODY MASS INDEX: 30.9 KG/M2 | HEART RATE: 76 BPM | WEIGHT: 215.8 LBS | SYSTOLIC BLOOD PRESSURE: 120 MMHG | DIASTOLIC BLOOD PRESSURE: 72 MMHG | OXYGEN SATURATION: 94 % | HEIGHT: 70 IN

## 2024-12-18 DIAGNOSIS — R05.9 COUGH, UNSPECIFIED TYPE: Primary | ICD-10-CM

## 2024-12-18 DIAGNOSIS — J34.89 RHINORRHEA: ICD-10-CM

## 2024-12-18 DIAGNOSIS — I11.0 HYPERTENSIVE HEART DISEASE WITH CHRONIC DIASTOLIC CONGESTIVE HEART FAILURE (HCC): ICD-10-CM

## 2024-12-18 DIAGNOSIS — R09.81 HEAD CONGESTION: ICD-10-CM

## 2024-12-18 DIAGNOSIS — I10 PRIMARY HYPERTENSION: ICD-10-CM

## 2024-12-18 DIAGNOSIS — R09.82 POST-NASAL DRIP: ICD-10-CM

## 2024-12-18 DIAGNOSIS — I50.32 HYPERTENSIVE HEART DISEASE WITH CHRONIC DIASTOLIC CONGESTIVE HEART FAILURE (HCC): ICD-10-CM

## 2024-12-18 PROCEDURE — 99214 OFFICE O/P EST MOD 30 MIN: CPT | Performed by: FAMILY MEDICINE

## 2024-12-18 PROCEDURE — G2211 COMPLEX E/M VISIT ADD ON: HCPCS | Performed by: FAMILY MEDICINE

## 2024-12-18 RX ORDER — AZITHROMYCIN 250 MG/1
TABLET, FILM COATED ORAL
Qty: 6 TABLET | Refills: 0 | Status: SHIPPED | OUTPATIENT
Start: 2024-12-18 | End: 2024-12-23

## 2024-12-18 NOTE — PROGRESS NOTES
"Name: Hood Ojeda      : 1945      MRN: [id number]  Encounter Provider: Kumar Evans DO  Encounter Date: 2024   Encounter department: [facility] PRIMARY CARE  :    Chief Complaint   Patient presents with    Cold Like Symptoms     COUGHING LAST FEW DAYS, CONGESTED     Patient Instructions   Rest and fluids, start abx and rec dimetapp dm cold and cough and chicken soup prn. Call if worse.     Assessment & Plan  Cough, unspecified type    Orders:    azithromycin (Zithromax) 250 mg tablet; Take 2 tablets (500 mg total) by mouth daily for 1 day, THEN 1 tablet (250 mg total) daily for 4 days.    Head congestion         Post-nasal drip         Rhinorrhea         Primary hypertension         Hypertensive heart disease with chronic diastolic congestive heart failure (HCC)  Wt Readings from Last 3 Encounters:   24 97.9 kg (215 lb 12.8 oz)   24 96.6 kg (213 lb)   24 91.2 kg (201 lb)                         History of Present Illness     Cold Like Symptoms (COUGHING LAST FEW DAYS, CONGESTED) Took allergy pills and zarbi for symptoms. Here with wife. No fever.       Review of Systems   Constitutional: Negative.    HENT:  Positive for congestion, postnasal drip and rhinorrhea.    Eyes: Negative.    Respiratory:  Positive for cough.    Cardiovascular: Negative.    Gastrointestinal: Negative.    Endocrine: Negative.    Genitourinary: Negative.    Musculoskeletal: Negative.    Skin: Negative.    Allergic/Immunologic: Negative.    Neurological: Negative.    Hematological: Negative.    Psychiatric/Behavioral: Negative.         Objective   /72   Pulse 76   Temp (!) 97.2 °F (36.2 °C) (Temporal)   Ht 5' 10\" (1.778 m)   Wt 97.9 kg (215 lb 12.8 oz)   SpO2 94%   BMI 30.96 kg/m²      Physical Exam  Constitutional:       Appearance: He is well-developed.   HENT:      Head: Normocephalic and atraumatic.      Right Ear: External ear normal.      Left Ear: External ear normal.     "  Nose: Congestion and rhinorrhea present.      Mouth/Throat:      Comments: Post nasal drip  Eyes:      Conjunctiva/sclera: Conjunctivae normal.      Pupils: Pupils are equal, round, and reactive to light.   Cardiovascular:      Rate and Rhythm: Normal rate and regular rhythm.      Heart sounds: Normal heart sounds.   Pulmonary:      Effort: Pulmonary effort is normal.      Breath sounds: Normal breath sounds.      Comments: cough  Musculoskeletal:         General: Normal range of motion.      Cervical back: Normal range of motion and neck supple.   Skin:     General: Skin is warm and dry.   Neurological:      Mental Status: He is alert and oriented to person, place, and time.      Deep Tendon Reflexes: Reflexes are normal and symmetric.   Psychiatric:         Behavior: Behavior normal.       Administrative Statements   I have spent a total time of 31 minutes in caring for this patient on the day of the visit/encounter including Diagnostic results, Prognosis, Risks and benefits of tx options, Instructions for management, Patient and family education, Importance of tx compliance, Risk factor reductions, Impressions, Counseling / Coordination of care, Documenting in the medical record, Reviewing / ordering tests, medicine, procedures  , and Obtaining or reviewing history  .

## 2024-12-18 NOTE — ASSESSMENT & PLAN NOTE
Wt Readings from Last 3 Encounters:   12/18/24 97.9 kg (215 lb 12.8 oz)   11/13/24 96.6 kg (213 lb)   09/19/24 91.2 kg (201 lb)

## 2024-12-18 NOTE — PATIENT INSTRUCTIONS
Rest and fluids, start abx and rec dimetapp dm cold and cough and chicken soup prn. Call if worse.

## 2024-12-25 DIAGNOSIS — I65.21 STENOSIS OF RIGHT CAROTID ARTERY: ICD-10-CM

## 2024-12-25 DIAGNOSIS — I63.81 LEFT SIDED LACUNAR INFARCTION (HCC): ICD-10-CM

## 2024-12-26 RX ORDER — CLOPIDOGREL BISULFATE 75 MG/1
75 TABLET ORAL DAILY
Qty: 90 TABLET | Refills: 1 | Status: SHIPPED | OUTPATIENT
Start: 2024-12-26

## 2025-01-07 ENCOUNTER — TELEPHONE (OUTPATIENT)
Age: 80
End: 2025-01-07

## 2025-01-07 NOTE — TELEPHONE ENCOUNTER
CG Clara Khan (CG is on the communication consent form) calls to ask about tetanus vaccine. Patient was bitten by an outside kitten (not a family pet). Patient had a tetanus vaccine in 2019. I was able to instruct the CG on simple wound care to include cleansing with soap and water and applying a bandage with bacitracin if the wound is oozing. We discussed s/sx of infection and when to notify the PCP of any complications. CG verbalizes understanding.

## 2025-01-07 NOTE — TELEPHONE ENCOUNTER
Called Ed back and spk w/Clara Ojeda and advised Ed's Td is current and he should contact us if it looks like it's getting infected.

## 2025-01-07 NOTE — TELEPHONE ENCOUNTER
Reviewed. Up to date on tetanus. Please have patient contact us if any worsening of bite. May need antibiotics

## 2025-01-10 ENCOUNTER — TELEPHONE (OUTPATIENT)
Age: 80
End: 2025-01-10

## 2025-01-10 NOTE — TELEPHONE ENCOUNTER
Lm for pts spouse, as animal bites get infected fast she should take pt to urgent care to be seen today. Dr Camacho is not in the office today and there are no available appts.

## 2025-01-10 NOTE — TELEPHONE ENCOUNTER
Clara care giver calling in an update on Edward Left hand 2nd finger injury.  She stated it is now swollen and looks infected.    Please review as soon as possible  No appointment available today    Pharmacy [facility] [place]     Thank you

## 2025-01-11 ENCOUNTER — HOSPITAL ENCOUNTER (EMERGENCY)
Facility: HOSPITAL | Age: 80
Discharge: HOME | End: 2025-01-11
Attending: EMERGENCY MEDICINE
Payer: COMMERCIAL

## 2025-01-11 VITALS — BODY MASS INDEX: 31.41 KG/M2 | WEIGHT: 218.92 LBS | TEMPERATURE: 97.6 F | RESPIRATION RATE: 18 BRPM
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
## 2025-01-11 PROCEDURE — 96372 THER/PROPH/DIAG INJ SC/IM: CPT

## 2025-01-11 PROCEDURE — 99284 EMERGENCY DEPT VISIT MOD MDM: CPT | Performed by: EMERGENCY MEDICINE

## 2025-01-11 PROCEDURE — 99283 EMERGENCY DEPT VISIT LOW MDM: CPT

## 2025-01-11 PROCEDURE — 90375 RABIES IG IM/SC: CPT | Performed by: EMERGENCY MEDICINE

## 2025-01-11 PROCEDURE — 90471 IMMUNIZATION ADMIN: CPT

## 2025-01-11 PROCEDURE — 90675 RABIES VACCINE IM: CPT | Performed by: EMERGENCY MEDICINE

## 2025-01-11 RX ADMIN — Medication 1 ML: at 19:53

## 2025-01-11 RX ADMIN — RABIES IMMUNE GLOBULIN (HUMAN) 2100 UNITS: 300 INJECTION, SOLUTION INFILTRATION; INTRAMUSCULAR at 19:53

## 2025-01-12 NOTE — ED PROVIDER NOTES
Time reflects when diagnosis was documented in both MDM as applicable and the Disposition within this note       Time User Action Codes Description Comment    1/11/2025  7:23 PM Aaliyah Emery Add [W55.01XA] Cat bite, initial encounter           ED Disposition       ED Disposition   Discharge    Condition   Stable    Date/Time   Sat Jan 11, 2025  7:23 PM    Comment   Hood Ojeda discharge to home/self care.                   Assessment & Plan       Medical Decision Making  80 yo M with cat bite to finger- rabies IG/vaccines, instructed to get subsequent vaccines at   Discussed importance of taking the abx previously prescribed and reviewed return precautions/signs/sx of worsening infection     Risk  Prescription drug management.             Medications   rabies vaccine, human diploid IM injection 1 mL (has no administration in time range)   rabies immune globulin, human (HyperRAB) injection 2,100 Units (has no administration in time range)       ED Risk Strat Scores                          SBIRT 22yo+      Flowsheet Row Most Recent Value   Initial Alcohol Screen: US AUDIT-C     1. How often do you have a drink containing alcohol? 0 Filed at: 01/11/2025 1846   2. How many drinks containing alcohol do you have on a typical day you are drinking?  0 Filed at: 01/11/2025 1846   3a. Male UNDER 65: How often do you have five or more drinks on one occasion? 0 Filed at: 01/11/2025 1846   3b. FEMALE Any Age, or MALE 65+: How often do you have 4 or more drinks on one occassion? 0 Filed at: 01/11/2025 1846   Audit-C Score 0 Filed at: 01/11/2025 1846   EMILY: How many times in the past year have you...    Used an illegal drug or used a prescription medication for non-medical reasons? Never Filed at: 01/11/2025 1846                            History of Present Illness       Chief Complaint   Patient presents with    Cat Bite     Pt got bite on Wednesday on left finger and was sent in by urgent care to get rabies shots.        Past Medical History:   Diagnosis Date    Cancer (HCC)     prostate    Carotid stenosis     Hypertension     Left sided lacunar infarction (HCC) 12/30/2020      Past Surgical History:   Procedure Laterality Date    TONSILLECTOMY      TRANSURETHRAL RESECTION OF PROSTATE        Family History   Problem Relation Age of Onset    Colon cancer Mother     No Known Problems Father     Leukemia Maternal Grandmother     Diabetes Maternal Grandmother     Alcohol abuse Maternal Grandfather     No Known Problems Paternal Grandmother     No Known Problems Paternal Grandfather       Social History     Tobacco Use    Smoking status: Former    Smokeless tobacco: Never   Vaping Use    Vaping status: Never Used   Substance Use Topics    Alcohol use: Yes     Alcohol/week: 4.0 standard drinks of alcohol     Types: 4 Cans of beer per week     Comment: several days a week    Drug use: Never      E-Cigarette/Vaping    E-Cigarette Use Never User       E-Cigarette/Vaping Substances    Nicotine No     THC No     CBD No     Flavoring No     Other No     Unknown No       I have reviewed and agree with the history as documented.     78 yo M presenting for rabies IG/vaccine    Pt was trying to help a stray cat in his yard 3 days ago when it bit his L 2nd finger.  He went to  today who updated his Tetanus and started hm on abx and then sent here for rabies IG/vaccine because they do not stock the IG.    Pt does have some swelling/erythema to the distal half of the finger, no purulent drainage  No systemic sx               Review of Systems        Objective       ED Triage Vitals [01/11/25 1843]   Temperature Pulse BP Respirations SpO2 Patient Position - Orthostatic VS   97.6 °F (36.4 °C) -- -- 18 -- Sitting      Temp Source Heart Rate Source BP Location FiO2 (%) Pain Score    Oral Monitor Right arm -- --      Vitals      Date and Time Temp Pulse SpO2 Resp BP Pain Score FACES Pain Rating User   01/11/25 1843 97.6 °F (36.4 °C) -- -- 18 -- --  -- SR            Physical Exam  Musculoskeletal:      Comments: L hand: 2nd digit with small scabbed areas, erythema/swelling to distal half of finger, ROM intact, sensation intact. Small scab to dorsum of hand with no surrounding swelling/erythema, no streaking up the arm.          Results Reviewed       None            No orders to display       Procedures    ED Medication and Procedure Management   Prior to Admission Medications   Prescriptions Last Dose Informant Patient Reported? Taking?   acetaminophen (TYLENOL) 325 mg tablet  Self Yes No   Sig: Take 650 mg by mouth every 6 (six) hours as needed for mild pain As needed   amLODIPine (NORVASC) 10 mg tablet   No No   Sig: TAKE 1 TABLET BY MOUTH EVERY DAY   atorvastatin (LIPITOR) 40 mg tablet  Self No No   Sig: TAKE 1 TABLET BY MOUTH EVERY DAY WITH DINNER   clopidogrel (PLAVIX) 75 mg tablet   No No   Sig: TAKE 1 TABLET BY MOUTH EVERY DAY   furosemide (LASIX) 20 mg tablet   No No   Sig: TAKE 1 TABLET (20 MG TOTAL) BY MOUTH EVERY OTHER DAY TAKE IT IN THE MORNING.   losartan (COZAAR) 25 mg tablet   No No   Sig: TAKE 1 TABLET (25 MG TOTAL) BY MOUTH DAILY.   montelukast (SINGULAIR) 10 mg tablet   No No   Sig: TAKE 1 TABLET BY MOUTH EVERYDAY AT BEDTIME   spironolactone (ALDACTONE) 25 mg tablet   No No   Sig: TAKE 1/2 TABLET BY MOUTH EVERY DAY      Facility-Administered Medications: None     Patient's Medications   Discharge Prescriptions    No medications on file     No discharge procedures on file.  ED SEPSIS DOCUMENTATION   Time reflects when diagnosis was documented in both MDM as applicable and the Disposition within this note       Time User Action Codes Description Comment    1/11/2025  7:23 PM Aaliyah Emery Add [W55.01XA] Cat bite, initial encounter                  Aaliyah Emery DO  01/11/25 1934

## 2025-01-12 NOTE — DISCHARGE INSTRUCTIONS
Take antibiotic as previously prescribed    Return to the ER if signs of worsening infection, including but not limited to worsening swelling, redness, streaking up arm, fevers, chills, etc.     Get repeat rabies vaccine at Urgent Care on Days 3, 7, & 14

## 2025-01-13 ENCOUNTER — VBI (OUTPATIENT)
Dept: FAMILY MEDICINE CLINIC | Facility: CLINIC | Age: 80
End: 2025-01-13

## 2025-01-14 ENCOUNTER — HOSPITAL ENCOUNTER (EMERGENCY)
Facility: HOSPITAL | Age: 80
Discharge: HOME | End: 2025-01-14
Payer: COMMERCIAL

## 2025-01-14 VITALS
OXYGEN SATURATION: 98 % | DIASTOLIC BLOOD PRESSURE: 78 MMHG | WEIGHT: 217.59 LBS | HEART RATE: 67 BPM | TEMPERATURE: 97.8 F | SYSTOLIC BLOOD PRESSURE: 175 MMHG | BODY MASS INDEX: 31.22 KG/M2 | RESPIRATION RATE: 20 BRPM

## 2025-01-14 DIAGNOSIS — Z23 ENCOUNTER FOR REPEAT ADMINISTRATION OF RABIES VACCINATION: Primary | ICD-10-CM

## 2025-01-14 DIAGNOSIS — E78.5 HYPERLIPIDEMIA: ICD-10-CM

## 2025-01-14 DIAGNOSIS — I63.81 LEFT SIDED LACUNAR INFARCTION (HCC): ICD-10-CM

## 2025-01-14 DIAGNOSIS — I61.9 HEMORRHAGIC STROKE (HCC): ICD-10-CM

## 2025-01-14 PROCEDURE — 90471 IMMUNIZATION ADMIN: CPT

## 2025-01-14 PROCEDURE — 90675 RABIES VACCINE IM: CPT

## 2025-01-14 PROCEDURE — 99284 EMERGENCY DEPT VISIT MOD MDM: CPT

## 2025-01-14 RX ORDER — ATORVASTATIN CALCIUM 40 MG/1
40 TABLET, FILM COATED ORAL
Qty: 90 TABLET | Refills: 1 | Status: SHIPPED | OUTPATIENT
Start: 2025-01-14

## 2025-01-14 RX ADMIN — Medication 1 ML: at 06:12

## 2025-01-14 NOTE — ED PROVIDER NOTES
Time reflects when diagnosis was documented in both MDM as applicable and the Disposition within this note       Time User Action Codes Description Comment    1/14/2025  6:11 AM Dolly Vaca Add [Z23] Encounter for repeat administration of rabies vaccination           ED Disposition       ED Disposition   Discharge    Condition   Stable    Date/Time   Tue Jan 14, 2025  6:11 AM    Comment   Hood Ojeda discharge to home/self care.                   Assessment & Plan       Medical Decision Making  Suspecting his decreased ROM is due to generalized swelling of the finger from healing. There are no a and ctive signs of infection and pt has /been taking his abx. Discussed hand exercises for at home to increase his finger mobility. Need to return on 1/18 and 1/25 for rest of rabies vaccine.     I have discussed the plan to discharge pt from ED. The patient was discharged in stable condition.  Patient ambulated off the department.  Extensive return to emergency department precautions were discussed.  Follow up with appropriate providers including primary care physician was discussed.  Patient and/or their  primary decision maker expressed understanding.  Patient remained stable during entire emergency department stay.      Risk  Prescription drug management.             Medications   rabies vaccine, human diploid IM injection 1 mL (1 mL Intramuscular Given 1/14/25 0612)       ED Risk Strat Scores                                              History of Present Illness       Chief Complaint   Patient presents with    Follow Up Rabies     Pt states he was here three days ago for a cat bite and got a rabies shot. Pt is here today for his second shot. Denies any c/o.        Past Medical History:   Diagnosis Date    Cancer (HCC)     prostate    Carotid stenosis     Hypertension     Left sided lacunar infarction (HCC) 12/30/2020      Past Surgical History:   Procedure Laterality Date    TONSILLECTOMY      TRANSURETHRAL  RESECTION OF PROSTATE        Family History   Problem Relation Age of Onset    Colon cancer Mother     No Known Problems Father     Leukemia Maternal Grandmother     Diabetes Maternal Grandmother     Alcohol abuse Maternal Grandfather     No Known Problems Paternal Grandmother     No Known Problems Paternal Grandfather       Social History     Tobacco Use    Smoking status: Former    Smokeless tobacco: Never   Vaping Use    Vaping status: Never Used   Substance Use Topics    Alcohol use: Yes     Alcohol/week: 4.0 standard drinks of alcohol     Types: 4 Cans of beer per week     Comment: several days a week    Drug use: Never      E-Cigarette/Vaping    E-Cigarette Use Never User       E-Cigarette/Vaping Substances    Nicotine No     THC No     CBD No     Flavoring No     Other No     Unknown No       I have reviewed and agree with the history as documented.     79 YOM presents today for his follow up rabies. Sustained a cat bite on 1/ and started on rabies series. His wound on his left pointer finger is healing well. He denies any drainage, redness, fevers, chills. Reports some swelling and pain.         Review of Systems   Musculoskeletal:  Positive for joint swelling.   All other systems reviewed and are negative.          Objective       ED Triage Vitals [01/14/25 0601]   Temperature Pulse Blood Pressure Respirations SpO2 Patient Position - Orthostatic VS   97.8 °F (36.6 °C) 67 (!) 175/78 20 98 % Sitting      Temp Source Heart Rate Source BP Location FiO2 (%) Pain Score    Oral Monitor Right arm -- --      Vitals      Date and Time Temp Pulse SpO2 Resp BP Pain Score FACES Pain Rating User   01/14/25 0601 97.8 °F (36.6 °C) 67 98 % 20 175/78 -- -- SH            Physical Exam  Vitals and nursing note reviewed.   Constitutional:       General: He is not in acute distress.     Appearance: Normal appearance. He is well-developed.   HENT:      Head: Normocephalic and atraumatic.   Eyes:      Conjunctiva/sclera:  Conjunctivae normal.   Cardiovascular:      Rate and Rhythm: Normal rate and regular rhythm.      Heart sounds: No murmur heard.  Pulmonary:      Effort: Pulmonary effort is normal. No respiratory distress.      Breath sounds: Normal breath sounds.   Abdominal:      Palpations: Abdomen is soft.      Tenderness: There is no abdominal tenderness.   Musculoskeletal:         General: Swelling present. Normal range of motion.      Cervical back: Normal range of motion and neck supple.      Comments: Has some generalized swelling to the left pointer finger. No signs of infection- redness, warmth. Sensation intact.    Skin:     General: Skin is warm and dry.      Capillary Refill: Capillary refill takes less than 2 seconds.   Neurological:      Mental Status: He is alert.   Psychiatric:         Mood and Affect: Mood normal.         Results Reviewed       None            No orders to display       Procedures    ED Medication and Procedure Management   Prior to Admission Medications   Prescriptions Last Dose Informant Patient Reported? Taking?   acetaminophen (TYLENOL) 325 mg tablet  Self Yes No   Sig: Take 650 mg by mouth every 6 (six) hours as needed for mild pain As needed   amLODIPine (NORVASC) 10 mg tablet   No No   Sig: TAKE 1 TABLET BY MOUTH EVERY DAY   atorvastatin (LIPITOR) 40 mg tablet  Self No No   Sig: TAKE 1 TABLET BY MOUTH EVERY DAY WITH DINNER   clopidogrel (PLAVIX) 75 mg tablet   No No   Sig: TAKE 1 TABLET BY MOUTH EVERY DAY   furosemide (LASIX) 20 mg tablet   No No   Sig: TAKE 1 TABLET (20 MG TOTAL) BY MOUTH EVERY OTHER DAY TAKE IT IN THE MORNING.   losartan (COZAAR) 25 mg tablet   No No   Sig: TAKE 1 TABLET (25 MG TOTAL) BY MOUTH DAILY.   montelukast (SINGULAIR) 10 mg tablet   No No   Sig: TAKE 1 TABLET BY MOUTH EVERYDAY AT BEDTIME   spironolactone (ALDACTONE) 25 mg tablet   No No   Sig: TAKE 1/2 TABLET BY MOUTH EVERY DAY      Facility-Administered Medications: None     Patient's Medications   Discharge  Prescriptions    No medications on file     No discharge procedures on file.  ED SEPSIS DOCUMENTATION   Time reflects when diagnosis was documented in both MDM as applicable and the Disposition within this note       Time User Action Codes Description Comment    1/14/2025  6:11 AM Dolly Vaca Add [Z23] Encounter for repeat administration of rabies vaccination                  Dolly Vaca PA-C  01/14/25 0618

## 2025-01-14 NOTE — DISCHARGE INSTRUCTIONS
Return on 1/18 for day 7 of rabies vaccine  Return on 1/25 for day 14 of rabies vaccine- last one!

## 2025-01-15 ENCOUNTER — TELEPHONE (OUTPATIENT)
Age: 80
End: 2025-01-15

## 2025-01-15 NOTE — TELEPHONE ENCOUNTER
Clara called in and wanted to update Dr. Camacho on pt's animal bite that occurred on 1/7, as [facility]10.     She stated at [facility] pt was given Tdap vaccine and also prescribed amoxicillin- clavulanate potassium 875-125mg to take for 1 week. Pt has received 2 rabies vaccines at the hospital and will received 2 more for a total of 4. Next ED visit will be on 1/21 for 3rd vaccine.     She stated she will call back after last vaccine if recommended to follow up with pcp.

## 2025-01-16 ENCOUNTER — TELEPHONE (OUTPATIENT)
Dept: FAMILY MEDICINE CLINIC | Facility: CLINIC | Age: 80
End: 2025-01-16

## 2025-01-16 NOTE — TELEPHONE ENCOUNTER
----- Message from Major MAJOR sent at 1/15/2025  3:16 PM EST -----  Regarding: Pt in ED for Second Rabies Shot  01/15/25 3:18 PM    Patient has a recent ED visit for secon shot in Rabies series. Please follow-up with the patient.    Thank you.  Major Garcia MA  [facility]

## 2025-01-18 ENCOUNTER — HOSPITAL ENCOUNTER (EMERGENCY)
Facility: HOSPITAL | Age: 80
Discharge: HOME | End: 2025-01-18
Payer: COMMERCIAL

## 2025-01-18 VITALS
TEMPERATURE: 97.9 F | DIASTOLIC BLOOD PRESSURE: 71 MMHG | RESPIRATION RATE: 18 BRPM | BODY MASS INDEX: 31.41 KG/M2 | SYSTOLIC BLOOD PRESSURE: 161 MMHG | WEIGHT: 218.92 LBS | HEART RATE: 63 BPM | OXYGEN SATURATION: 98 %

## 2025-01-18 DIAGNOSIS — Z23 ENCOUNTER FOR REPEAT ADMINISTRATION OF RABIES VACCINATION: Primary | ICD-10-CM

## 2025-01-18 PROCEDURE — 90471 IMMUNIZATION ADMIN: CPT

## 2025-01-18 PROCEDURE — 90675 RABIES VACCINE IM: CPT | Performed by: PHYSICIAN ASSISTANT

## 2025-01-18 PROCEDURE — 99282 EMERGENCY DEPT VISIT SF MDM: CPT | Performed by: PHYSICIAN ASSISTANT

## 2025-01-18 RX ADMIN — RABIES VIRUS STRAIN PM-1503-3M ANTIGEN (PROPIOLACTONE INACTIVATED) AND WATER 1 ML: KIT at 06:15

## 2025-01-18 NOTE — DISCHARGE INSTRUCTIONS
Please refer to the attached information for strict return instructions. If symptoms worsen or new symptoms develop please return to the ER.

## 2025-01-18 NOTE — ED PROVIDER NOTES
Time reflects when diagnosis was documented in both MDM as applicable and the Disposition within this note       Time User Action Codes Description Comment    1/18/2025  6:11 AM Markos Chin Add [Z23] Encounter for repeat administration of rabies vaccination           ED Disposition       ED Disposition   Discharge    Condition   Stable    Date/Time   Sat Jan 18, 2025  6:11 AM    Comment   Hood Ojeda discharge to home/self care.                   Assessment & Plan       Medical Decision Making  Pt presents for repeat rabies vaccination, no current symptoms. Provided vaccine here, repeat vaccine at day 14 reviewed. Return indications discussed.     Risk  Prescription drug management.             Medications   rabies vaccine, human diploid IM injection 1 mL (1 mL Intramuscular Given 1/18/25 0615)       ED Risk Strat Scores                                              History of Present Illness       Chief Complaint   Patient presents with    Follow Up Rabies     Pt returned for follow up rabies shot, per paperwork provided pt is on day 7 of rabies vaccine. Pt offers no complaints at this time.        Past Medical History:   Diagnosis Date    Cancer (HCC)     prostate    Carotid stenosis     Hypertension     Left sided lacunar infarction (HCC) 12/30/2020      Past Surgical History:   Procedure Laterality Date    TONSILLECTOMY      TRANSURETHRAL RESECTION OF PROSTATE        Family History   Problem Relation Age of Onset    Colon cancer Mother     No Known Problems Father     Leukemia Maternal Grandmother     Diabetes Maternal Grandmother     Alcohol abuse Maternal Grandfather     No Known Problems Paternal Grandmother     No Known Problems Paternal Grandfather       Social History     Tobacco Use    Smoking status: Former    Smokeless tobacco: Never   Vaping Use    Vaping status: Never Used   Substance Use Topics    Alcohol use: Yes     Alcohol/week: 4.0 standard drinks of alcohol     Types: 4 Cans of beer per  week     Comment: several days a week    Drug use: Never      E-Cigarette/Vaping    E-Cigarette Use Never User       E-Cigarette/Vaping Substances    Nicotine No     THC No     CBD No     Flavoring No     Other No     Unknown No       I have reviewed and agree with the history as documented.     Hood is an 78 yo M presenting for repeat rabies vaccination after cat bite on 1/11. He is asymptomatic at this time.       History provided by:  Patient      Review of Systems   Constitutional:  Negative for chills and fever.   HENT:  Negative for congestion, rhinorrhea and sore throat.    Respiratory:  Negative for cough, shortness of breath and wheezing.    Cardiovascular:  Negative for chest pain and palpitations.   Gastrointestinal:  Negative for abdominal pain, nausea and vomiting.   Musculoskeletal:  Negative for back pain, neck pain and neck stiffness.   Skin:  Negative for rash and wound.   Neurological:  Negative for dizziness, weakness, light-headedness and numbness.           Objective       ED Triage Vitals [01/18/25 0608]   Temperature Pulse Blood Pressure Respirations SpO2 Patient Position - Orthostatic VS   97.9 °F (36.6 °C) 63 161/71 18 98 % Sitting      Temp Source Heart Rate Source BP Location FiO2 (%) Pain Score    Oral Monitor Right arm -- No Pain      Vitals      Date and Time Temp Pulse SpO2 Resp BP Pain Score FACES Pain Rating User   01/18/25 0608 97.9 °F (36.6 °C) 63 98 % 18 161/71 No Pain -- NM            Physical Exam  Constitutional:       General: He is not in acute distress.     Appearance: He is well-developed. He is not diaphoretic.   HENT:      Head: Normocephalic and atraumatic.      Right Ear: External ear normal.      Left Ear: External ear normal.   Eyes:      Extraocular Movements:      Right eye: Normal extraocular motion.      Left eye: Normal extraocular motion.      Conjunctiva/sclera: Conjunctivae normal.      Pupils: Pupils are equal, round, and reactive to light.    Cardiovascular:      Rate and Rhythm: Normal rate and regular rhythm.   Pulmonary:      Effort: Pulmonary effort is normal. No accessory muscle usage or respiratory distress.   Abdominal:      General: Abdomen is flat. There is no distension.   Musculoskeletal:      Cervical back: Normal range of motion. No rigidity.   Skin:     General: Skin is warm and dry.      Capillary Refill: Capillary refill takes less than 2 seconds.      Findings: No erythema or rash.   Neurological:      Mental Status: He is alert and oriented to person, place, and time.      Motor: No abnormal muscle tone.      Coordination: Coordination normal.   Psychiatric:         Behavior: Behavior normal.         Thought Content: Thought content normal.         Judgment: Judgment normal.         Results Reviewed       None            No orders to display       Procedures    ED Medication and Procedure Management   Prior to Admission Medications   Prescriptions Last Dose Informant Patient Reported? Taking?   acetaminophen (TYLENOL) 325 mg tablet  Self Yes No   Sig: Take 650 mg by mouth every 6 (six) hours as needed for mild pain As needed   amLODIPine (NORVASC) 10 mg tablet   No No   Sig: TAKE 1 TABLET BY MOUTH EVERY DAY   atorvastatin (LIPITOR) 40 mg tablet   No No   Sig: TAKE 1 TABLET BY MOUTH EVERY DAY WITH DINNER   clopidogrel (PLAVIX) 75 mg tablet   No No   Sig: TAKE 1 TABLET BY MOUTH EVERY DAY   furosemide (LASIX) 20 mg tablet   No No   Sig: TAKE 1 TABLET (20 MG TOTAL) BY MOUTH EVERY OTHER DAY TAKE IT IN THE MORNING.   losartan (COZAAR) 25 mg tablet   No No   Sig: TAKE 1 TABLET (25 MG TOTAL) BY MOUTH DAILY.   montelukast (SINGULAIR) 10 mg tablet   No No   Sig: TAKE 1 TABLET BY MOUTH EVERYDAY AT BEDTIME   spironolactone (ALDACTONE) 25 mg tablet   No No   Sig: TAKE 1/2 TABLET BY MOUTH EVERY DAY      Facility-Administered Medications: None     Discharge Medication List as of 1/18/2025  6:11 AM        CONTINUE these medications which have NOT  CHANGED    Details   acetaminophen (TYLENOL) 325 mg tablet Take 650 mg by mouth every 6 (six) hours as needed for mild pain As needed, Historical Med      amLODIPine (NORVASC) 10 mg tablet TAKE 1 TABLET BY MOUTH EVERY DAY, Normal      atorvastatin (LIPITOR) 40 mg tablet TAKE 1 TABLET BY MOUTH EVERY DAY WITH DINNER, Starting Tue 1/14/2025, Normal      clopidogrel (PLAVIX) 75 mg tablet TAKE 1 TABLET BY MOUTH EVERY DAY, Starting Thu 12/26/2024, Normal      furosemide (LASIX) 20 mg tablet TAKE 1 TABLET (20 MG TOTAL) BY MOUTH EVERY OTHER DAY TAKE IT IN THE MORNING., Starting Tue 10/22/2024, Normal      losartan (COZAAR) 25 mg tablet TAKE 1 TABLET (25 MG TOTAL) BY MOUTH DAILY., Starting Wed 9/18/2024, Normal      montelukast (SINGULAIR) 10 mg tablet TAKE 1 TABLET BY MOUTH EVERYDAY AT BEDTIME, Starting Fri 11/8/2024, Normal      spironolactone (ALDACTONE) 25 mg tablet TAKE 1/2 TABLET BY MOUTH EVERY DAY, Starting Tue 10/22/2024, Normal           No discharge procedures on file.  ED SEPSIS DOCUMENTATION   Time reflects when diagnosis was documented in both MDM as applicable and the Disposition within this note       Time User Action Codes Description Comment    1/18/2025  6:11 AM Markos Chin Add [Z23] Encounter for repeat administration of rabies vaccination                  Markos Chin PA-C  01/18/25 0607

## 2025-01-20 ENCOUNTER — TELEPHONE (OUTPATIENT)
Age: 80
End: 2025-01-20

## 2025-01-20 NOTE — TELEPHONE ENCOUNTER
Clara wife wanted to know when does Edward need his other Rabies vaccine he gets at the ED.    Per 1/14/25 visit and notes 1/25/25 is his next Rabies vaccine. Informed Clara.

## 2025-01-25 ENCOUNTER — HOSPITAL ENCOUNTER (EMERGENCY)
Facility: HOSPITAL | Age: 80
Discharge: HOME | End: 2025-01-25
Attending: EMERGENCY MEDICINE | Admitting: EMERGENCY MEDICINE
Payer: COMMERCIAL

## 2025-01-25 VITALS
WEIGHT: 214.51 LBS | HEART RATE: 65 BPM | SYSTOLIC BLOOD PRESSURE: 177 MMHG | TEMPERATURE: 97.4 F | OXYGEN SATURATION: 96 % | DIASTOLIC BLOOD PRESSURE: 107 MMHG | BODY MASS INDEX: 30.78 KG/M2 | RESPIRATION RATE: 18 BRPM

## 2025-01-25 DIAGNOSIS — Z23 ENCOUNTER FOR REPEAT ADMINISTRATION OF RABIES VACCINATION: Primary | ICD-10-CM

## 2025-01-25 PROCEDURE — 99283 EMERGENCY DEPT VISIT LOW MDM: CPT | Performed by: EMERGENCY MEDICINE

## 2025-01-25 PROCEDURE — 90471 IMMUNIZATION ADMIN: CPT

## 2025-01-25 PROCEDURE — 90675 RABIES VACCINE IM: CPT | Performed by: EMERGENCY MEDICINE

## 2025-01-25 RX ADMIN — RABIES VIRUS STRAIN PM-1503-3M ANTIGEN (PROPIOLACTONE INACTIVATED) AND WATER 1 ML: KIT at 07:20

## 2025-01-25 NOTE — ED PROVIDER NOTES
Time reflects when diagnosis was documented in both MDM as applicable and the Disposition within this note       Time User Action Codes Description Comment    1/25/2025  7:12 AM Renetta Cain Add [Z23] Encounter for repeat administration of rabies vaccination           ED Disposition       ED Disposition   Discharge    Condition   Stable    Date/Time   Sat Jan 25, 2025  7:12 AM    Comment   Hood Ojeda discharge to home/self care.                   Assessment & Plan       Medical Decision Making  Will give rabies vaccination.    Risk  Prescription drug management.             Medications   rabies vaccine, human diploid IM injection 1 mL (1 mL Intramuscular Given 1/25/25 0720)       ED Risk Strat Scores                          SBIRT 22yo+      Flowsheet Row Most Recent Value   Initial Alcohol Screen: US AUDIT-C     1. How often do you have a drink containing alcohol? 0 Filed at: 01/25/2025 0702   2. How many drinks containing alcohol do you have on a typical day you are drinking?  0 Filed at: 01/25/2025 0702   3a. Male UNDER 65: How often do you have five or more drinks on one occasion? 0 Filed at: 01/25/2025 0702   3b. FEMALE Any Age, or MALE 65+: How often do you have 4 or more drinks on one occassion? 0 Filed at: 01/25/2025 0702   Audit-C Score 0 Filed at: 01/25/2025 0702   EMILY: How many times in the past year have you...    Used an illegal drug or used a prescription medication for non-medical reasons? Never Filed at: 01/25/2025 0702                            History of Present Illness       Chief Complaint   Patient presents with    Follow Up Rabies     Patient arrives for last rabies vaccine.        Past Medical History:   Diagnosis Date    Cancer (HCC)     prostate    Carotid stenosis     Hypertension     Left sided lacunar infarction (HCC) 12/30/2020      Past Surgical History:   Procedure Laterality Date    TONSILLECTOMY      TRANSURETHRAL RESECTION OF PROSTATE        Family History   Problem  Relation Age of Onset    Colon cancer Mother     No Known Problems Father     Leukemia Maternal Grandmother     Diabetes Maternal Grandmother     Alcohol abuse Maternal Grandfather     No Known Problems Paternal Grandmother     No Known Problems Paternal Grandfather       Social History     Tobacco Use    Smoking status: Former    Smokeless tobacco: Never   Vaping Use    Vaping status: Never Used   Substance Use Topics    Alcohol use: Yes     Alcohol/week: 4.0 standard drinks of alcohol     Types: 4 Cans of beer per week     Comment: several days a week    Drug use: Never      E-Cigarette/Vaping    E-Cigarette Use Never User       E-Cigarette/Vaping Substances    Nicotine No     THC No     CBD No     Flavoring No     Other No     Unknown No       I have reviewed and agree with the history as documented.     79 y.o. M p/w rabies vaccination.  Pt here for last rabies vaccination. No complaints.      History provided by:  Patient   used: No        Review of Systems   Constitutional:  Negative for chills and fever.   Skin:  Negative for color change and rash.   Neurological:  Negative for headaches.           Objective       ED Triage Vitals [01/25/25 0657]   Temperature Pulse Blood Pressure Respirations SpO2 Patient Position - Orthostatic VS   (!) 97.4 °F (36.3 °C) 65 (!) 177/107 18 96 % Sitting      Temp Source Heart Rate Source BP Location FiO2 (%) Pain Score    Oral Monitor Right arm -- --      Vitals      Date and Time Temp Pulse SpO2 Resp BP Pain Score FACES Pain Rating User   01/25/25 0657 97.4 °F (36.3 °C) 65 96 % 18 177/107 -- -- EK            Physical Exam  Vitals and nursing note reviewed.   Constitutional:       General: He is not in acute distress.     Appearance: Normal appearance. He is not ill-appearing, toxic-appearing or diaphoretic.   HENT:      Head: Normocephalic and atraumatic.   Eyes:      General: No scleral icterus.     Conjunctiva/sclera:      Right eye: Right conjunctiva is  not injected.      Left eye: Left conjunctiva is not injected.   Neck:      Vascular: No JVD.      Trachea: Phonation normal.   Pulmonary:      Effort: Pulmonary effort is normal. No accessory muscle usage, respiratory distress or retractions.      Breath sounds: No stridor.   Musculoskeletal:         General: No deformity.   Skin:     Coloration: Skin is not cyanotic or jaundiced.      Comments: No erythema to right finger/hand of initial cat bite.   Neurological:      Mental Status: He is alert.      GCS: GCS eye subscore is 4. GCS verbal subscore is 5. GCS motor subscore is 6.      Cranial Nerves: No dysarthria.      Motor: No seizure activity.      Gait: Gait normal.         Results Reviewed       None            No orders to display       Procedures    ED Medication and Procedure Management   Prior to Admission Medications   Prescriptions Last Dose Informant Patient Reported? Taking?   acetaminophen (TYLENOL) 325 mg tablet  Self Yes No   Sig: Take 650 mg by mouth every 6 (six) hours as needed for mild pain As needed   amLODIPine (NORVASC) 10 mg tablet   No No   Sig: TAKE 1 TABLET BY MOUTH EVERY DAY   atorvastatin (LIPITOR) 40 mg tablet   No No   Sig: TAKE 1 TABLET BY MOUTH EVERY DAY WITH DINNER   clopidogrel (PLAVIX) 75 mg tablet   No No   Sig: TAKE 1 TABLET BY MOUTH EVERY DAY   furosemide (LASIX) 20 mg tablet   No No   Sig: TAKE 1 TABLET (20 MG TOTAL) BY MOUTH EVERY OTHER DAY TAKE IT IN THE MORNING.   losartan (COZAAR) 25 mg tablet   No No   Sig: TAKE 1 TABLET (25 MG TOTAL) BY MOUTH DAILY.   montelukast (SINGULAIR) 10 mg tablet   No No   Sig: TAKE 1 TABLET BY MOUTH EVERYDAY AT BEDTIME   spironolactone (ALDACTONE) 25 mg tablet   No No   Sig: TAKE 1/2 TABLET BY MOUTH EVERY DAY      Facility-Administered Medications: None     Discharge Medication List as of 1/25/2025  7:12 AM        CONTINUE these medications which have NOT CHANGED    Details   acetaminophen (TYLENOL) 325 mg tablet Take 650 mg by mouth every 6  (six) hours as needed for mild pain As needed, Historical Med      amLODIPine (NORVASC) 10 mg tablet TAKE 1 TABLET BY MOUTH EVERY DAY, Normal      atorvastatin (LIPITOR) 40 mg tablet TAKE 1 TABLET BY MOUTH EVERY DAY WITH DINNER, Starting Tue 1/14/2025, Normal      clopidogrel (PLAVIX) 75 mg tablet TAKE 1 TABLET BY MOUTH EVERY DAY, Starting Thu 12/26/2024, Normal      furosemide (LASIX) 20 mg tablet TAKE 1 TABLET (20 MG TOTAL) BY MOUTH EVERY OTHER DAY TAKE IT IN THE MORNING., Starting Tue 10/22/2024, Normal      losartan (COZAAR) 25 mg tablet TAKE 1 TABLET (25 MG TOTAL) BY MOUTH DAILY., Starting Wed 9/18/2024, Normal      montelukast (SINGULAIR) 10 mg tablet TAKE 1 TABLET BY MOUTH EVERYDAY AT BEDTIME, Starting Fri 11/8/2024, Normal      spironolactone (ALDACTONE) 25 mg tablet TAKE 1/2 TABLET BY MOUTH EVERY DAY, Starting Tue 10/22/2024, Normal           No discharge procedures on file.  ED SEPSIS DOCUMENTATION   Time reflects when diagnosis was documented in both MDM as applicable and the Disposition within this note       Time User Action Codes Description Comment    1/25/2025  7:12 AM Renetta Cain Add [Z23] Encounter for repeat administration of rabies vaccination                  Renetta Cain DO  01/25/25 1238

## 2025-02-07 ENCOUNTER — OFFICE VISIT (OUTPATIENT)
Dept: CARDIOLOGY CLINIC | Facility: CLINIC | Age: 80
End: 2025-02-07
Payer: COMMERCIAL

## 2025-02-07 VITALS
DIASTOLIC BLOOD PRESSURE: 60 MMHG | BODY MASS INDEX: 31.68 KG/M2 | HEART RATE: 68 BPM | SYSTOLIC BLOOD PRESSURE: 124 MMHG | WEIGHT: 220.8 LBS

## 2025-02-07 DIAGNOSIS — G47.33 OBSTRUCTIVE SLEEP APNEA TREATED WITH CONTINUOUS POSITIVE AIRWAY PRESSURE (CPAP): ICD-10-CM

## 2025-02-07 DIAGNOSIS — I50.32 HYPERTENSIVE HEART DISEASE WITH CHRONIC DIASTOLIC CONGESTIVE HEART FAILURE (HCC): ICD-10-CM

## 2025-02-07 DIAGNOSIS — I77.9 BILATERAL CAROTID ARTERY DISEASE, UNSPECIFIED TYPE (HCC): ICD-10-CM

## 2025-02-07 DIAGNOSIS — I10 PRIMARY HYPERTENSION: Primary | ICD-10-CM

## 2025-02-07 DIAGNOSIS — I50.32 CHRONIC DIASTOLIC HEART FAILURE WITH PRESERVED EJECTION FRACTION (HCC): ICD-10-CM

## 2025-02-07 DIAGNOSIS — I11.0 HYPERTENSIVE HEART DISEASE WITH CHRONIC DIASTOLIC CONGESTIVE HEART FAILURE (HCC): ICD-10-CM

## 2025-02-07 DIAGNOSIS — I49.1 PREMATURE ATRIAL CONTRACTION: ICD-10-CM

## 2025-02-07 PROCEDURE — G2211 COMPLEX E/M VISIT ADD ON: HCPCS | Performed by: INTERNAL MEDICINE

## 2025-02-07 PROCEDURE — 99215 OFFICE O/P EST HI 40 MIN: CPT | Performed by: INTERNAL MEDICINE

## 2025-02-07 NOTE — ASSESSMENT & PLAN NOTE
Blood pressure is currently well-controlled  Current antihypertensives include amlodipine 10 mg daily + losartan 25 mg daily + spironolactone 12.5 mg daily.  Echo in February 2023 showed mild concentric LVH and grade 2 diastolic dysfunction.  Advising to continue current medications.

## 2025-02-07 NOTE — PROGRESS NOTES
Name: Hood Ojeda      : 1945      MRN: [id number]  Encounter Provider: Janet Lara MD  Encounter Date: 2025   Encounter department: [facility] [place]    DIAGNOSES:  1. Primary hypertension  2.  Premature atrial contractions  3.  Dyslipidemia  4. 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 Bilateral carotid artery disease  6. Sensorineural hearing loss bilaterally  7. Chronic rhinitis  8. Recently diagnosed obstructive sleep apnea  9. Actinic keratosis  10. Diastolic dysfunction without heart failure  11. History of recurrent depression.  12. Right rotator cuff injury following fall from bed  13. History of prostate CA, status post radical prostatectomy about 23 years back.    CAROTID DUPLEX ULTRASOUND 12/3/2024: Known occlusion of right ICA with antegrade flow in right vertebral artery.  Less than 50% stenosis in left ICA with heterogeneous irregular plaque with antegrade flow in vertebral artery.     48-HOUR HOLTER 2022: Average heart rate was 66 bpm with minimum heart rate of 44 and maximum of 111.  There was 22 hours 30 minutes of bradycardia.  There was 5 minutes 42 seconds of tachycardia.  Predominant rhythm was normal sinus.  There was 1.6% supraventricular ectopy burden.  There were 6 supraventricular runs with longest run of 5 beats and fastest being 150 bpm.  There were 106 atrial pairs.  Longest R-R interval was 1.8 seconds.  There was no significant ventricular ectopy.    ECHOCARDIOGRAM 2023: Mild concentric left ventricle hypertrophy, normal left ventricular systolic function, EF 70%, grade 2 diastolic dysfunction, normal right ventricle size and systolic function, mild left atrial cavity enlargement, trace aortic valve regurgitation, trace mitral valve and tricuspid valve regurgitation, trace pulmonic valve regurgitation, no pericardial effusion.  No pulmonary hypertension.  Assessment & Plan  Primary  hypertension  Blood pressure is currently well-controlled  Current antihypertensives include amlodipine 10 mg daily + losartan 25 mg daily + spironolactone 12.5 mg daily.  Echo in February 2023 showed mild concentric LVH and grade 2 diastolic dysfunction.  Advising to continue current medications.       Hypertensive heart disease with chronic diastolic congestive heart failure (HCC)  Wt Readings from Last 3 Encounters:   02/07/25 100 kg (220 lb 12.8 oz)   01/25/25 97.3 kg (214 lb 8.1 oz)   01/18/25 99.3 kg (218 lb 14.7 oz)   Appears well compensated with no evidence of significant volume overload state.  Has trace lower extremity edema is more likely due to venous insufficiency.  Is on intermittent diuretic therapy.  Advised to continue current medications.  Advised to use CPAP consistently.  Should follow-up with primary care physician regularly and have blood work at least every 6 months.       Premature atrial contraction  Occasional premature atrial contraction is noted on rhythm strip performed today in office.  Last electrolytes were normal.  Advising continuing current medications.       Obstructive sleep apnea treated with continuous positive airway pressure (CPAP)  Advising to continue to use CPAP consistently.       Bilateral carotid artery disease, unspecified type (HCC)  Has known total occlusion and right carotid artery and less than 50% stenosis in left carotid artery.  I did not appreciate any bruit.  Does follow with vascular surgery.  Lipids are well-controlled on moderate intensity statin.  Advising to continue current medications and follow-up with vascular surgery on a regular basis.         I have spent a total time of 50 minutes on 02/07/25 in caring for this patient including reviewing and summarizing prior history and work-up, discussing test results and management options, arrangement of care and documenting of the visit.        History of Present Illness   HPI  Javieranna BETTINA Ojeda is a 79 y.o.  male who presents regarding follow-up of his chronic conditions including chronic diastolic heart failure, sleep apnea, primary hypertension, premature atrial contractions and other comorbidities.  He was last seen by me in April 2023.    History obtained from: patient    He is here for follow-up visit accompanied with his wife.  He mentions that since last visit he has had no new diagnoses or hospitalizations or illnesses.  From a symptom perspective his wife mentions that he sleeps a lot and he is tired.  Denies any unusual chest pain or shortness of breath with normal activity or palpitations or dizziness.  Reports that he does get lower extremity edema but this is usually towards the end of the day.  Denies any passing out or near passing out episodes.  His wife mentions that she notices him to get out of breath with physical activity.  Sleeps in a recliner.    Functional capacity status: GOOD   (Excellent- >10 METs; Good: (7-10 METs); Moderate (4-7 METs); Poor (<= 4 METs)    Any chronic stressors: None   (feeling of poor health, financial problems, and social isolation etc).    Tobacco or alcohol dependence: Denies any history of smoking.  Reports alcohol very rarely.    Current cardiac medications: Furosemide every other day, spironolactone 12.5 mg daily atorvastatin 40 mg daily amlodipine 10 mg daily clopidogrel 75 mg daily losartan 25 mg daily.    Last recent comprehensive blood work available:   Blood work 8/3/2024 sodium 139 potassium 4.3 chloride 104 bicarb 30 BUN 12 creatinine 0.82 GFR 83  Normal liver function test  Negative high sensitive troponins  Normal CBC  TSH 1.424 and free T46.86 In September 2023   Lipid profile 5/6/2024 total cholesterol 108 triglyceride 93 HDL 47 calculated LDL 42     ECG: No results found for this visit on 02/07/25.    REVIEW OF SYSTEMS   Positive for: As noted above in HPI  Negative for: All remaining as reviewed below and in HPI.    SYSTEM SYMPTOMS  REVIEWED:  General--weight change, fever, night sweats  Respiratory--cough, wheezing, shortness of breath, sputum production  Cardiovascular--chest pain, syncope, dyspnea on exertion, edema, decline in exercise tolerance, claudication   Gastrointestinal--persistent vomiting, diarrhea, abdominal distention, blood in stool   Muscular or skeletal--joint pain or swelling   Neurologic--headaches, syncope, abnormal movement  Hematologic--history of easy bruising and bleeding   Endocrine--thyroid enlargement, heat or cold intolerance, polyuria   Psychiatric--anxiety, depression     CURRENT MEDICATIONS LIST     Current Outpatient Medications:     acetaminophen (TYLENOL) 325 mg tablet, Take 650 mg by mouth every 6 (six) hours as needed for mild pain As needed, Disp: , Rfl:     amLODIPine (NORVASC) 10 mg tablet, TAKE 1 TABLET BY MOUTH EVERY DAY, Disp: 90 tablet, Rfl: 1    atorvastatin (LIPITOR) 40 mg tablet, TAKE 1 TABLET BY MOUTH EVERY DAY WITH DINNER, Disp: 90 tablet, Rfl: 1    clopidogrel (PLAVIX) 75 mg tablet, TAKE 1 TABLET BY MOUTH EVERY DAY, Disp: 90 tablet, Rfl: 1    furosemide (LASIX) 20 mg tablet, TAKE 1 TABLET (20 MG TOTAL) BY MOUTH EVERY OTHER DAY TAKE IT IN THE MORNING., Disp: 45 tablet, Rfl: 1    losartan (COZAAR) 25 mg tablet, TAKE 1 TABLET (25 MG TOTAL) BY MOUTH DAILY., Disp: 90 tablet, Rfl: 1    montelukast (SINGULAIR) 10 mg tablet, TAKE 1 TABLET BY MOUTH EVERYDAY AT BEDTIME, Disp: 90 tablet, Rfl: 1    spironolactone (ALDACTONE) 25 mg tablet, TAKE 1/2 TABLET BY MOUTH EVERY DAY, Disp: 45 tablet, Rfl: 1       ALLERGIES     Allergies   Allergen Reactions    Ace Inhibitors Cough       *-*-*-*-*-*-*-*-*-*-*-*-*-*-*-*-*-*-*-*-*-*-*-*-*-*-*-*-*-*-*-*-*-*-*-*-*-*-*-*-*-*-*-*-*-*-*-*-*-*-*-*-*-*-      Review of Systems       Objective   /60 (BP Location: Left arm, Patient Position: Sitting, Cuff Size: Adult)   Pulse 68   Wt 100 kg (220 lb 12.8 oz)   BMI 31.68 kg/m²      Physical Exam  Constitutional:        Appearance: He is well-developed. He is obese.   Neck:      Vascular: No carotid bruit or JVD.   Cardiovascular:      Rate and Rhythm: Normal rate and regular rhythm.      Heart sounds: Heart sounds are distant. No murmur heard.     Comments: Trace edema in lower extremities bilaterally  Pulmonary:      Effort: Pulmonary effort is normal.      Breath sounds: Normal breath sounds. No decreased breath sounds, wheezing, rhonchi or rales.   Abdominal:      Palpations: Abdomen is soft.   Skin:     General: Skin is warm and dry.   Neurological:      Mental Status: He is oriented to person, place, and time.   Psychiatric:         Mood and Affect: Mood normal.         Behavior: Behavior normal.

## 2025-02-07 NOTE — ASSESSMENT & PLAN NOTE
Occasional premature atrial contraction is noted on rhythm strip performed today in office.  Last electrolytes were normal.  Advising continuing current medications.

## 2025-02-07 NOTE — PATIENT INSTRUCTIONS
Assessment & Plan  Primary hypertension  Blood pressure is currently well-controlled  Current antihypertensives include amlodipine 10 mg daily + losartan 25 mg daily + spironolactone 12.5 mg daily.  Echo in February 2023 showed mild concentric LVH and grade 2 diastolic dysfunction.  Advising to continue current medications.       Hypertensive heart disease with chronic diastolic congestive heart failure (HCC)  Wt Readings from Last 3 Encounters:   02/07/25 100 kg (220 lb 12.8 oz)   01/25/25 97.3 kg (214 lb 8.1 oz)   01/18/25 99.3 kg (218 lb 14.7 oz)   Appears well compensated with no evidence of significant volume overload state.  Has trace lower extremity edema is more likely due to venous insufficiency.  Is on intermittent diuretic therapy.  Advised to continue current medications.  Advised to use CPAP consistently.  Should follow-up with primary care physician regularly and have blood work at least every 6 months.       Premature atrial contraction  Occasional premature atrial contraction is noted on rhythm strip performed today in office.  Last electrolytes were normal.  Advising continuing current medications.       Obstructive sleep apnea treated with continuous positive airway pressure (CPAP)  Advising to continue to use CPAP consistently.       Bilateral carotid artery disease, unspecified type (HCC)  Has known total occlusion and right carotid artery and less than 50% stenosis in left carotid artery.  I did not appreciate any bruit.  Does follow with vascular surgery.  Lipids are well-controlled on moderate intensity statin.  Advising to continue current medications and follow-up with vascular surgery on a regular basis.

## 2025-02-07 NOTE — ASSESSMENT & PLAN NOTE
Wt Readings from Last 3 Encounters:   02/07/25 100 kg (220 lb 12.8 oz)   01/25/25 97.3 kg (214 lb 8.1 oz)   01/18/25 99.3 kg (218 lb 14.7 oz)   Appears well compensated with no evidence of significant volume overload state.  Has trace lower extremity edema is more likely due to venous insufficiency.  Is on intermittent diuretic therapy.  Advised to continue current medications.  Advised to use CPAP consistently.  Should follow-up with primary care physician regularly and have blood work at least every 6 months.

## 2025-02-07 NOTE — ASSESSMENT & PLAN NOTE
Has known total occlusion and right carotid artery and less than 50% stenosis in left carotid artery.  I did not appreciate any bruit.  Does follow with vascular surgery.  Lipids are well-controlled on moderate intensity statin.  Advising to continue current medications and follow-up with vascular surgery on a regular basis.

## 2025-02-18 ENCOUNTER — APPOINTMENT (OUTPATIENT)
Dept: LAB | Facility: CLINIC | Age: 80
End: 2025-02-18
Payer: COMMERCIAL

## 2025-02-18 DIAGNOSIS — R73.03 PREDIABETES: ICD-10-CM

## 2025-02-18 DIAGNOSIS — E78.2 MIXED HYPERLIPIDEMIA: ICD-10-CM

## 2025-02-18 LAB
ALBUMIN SERPL BCG-MCNC: 4.4 G/DL (ref 3.5–5)
ALP SERPL-CCNC: 87 U/L (ref 34–104)
ALT SERPL W P-5'-P-CCNC: 20 U/L (ref 7–52)
ANION GAP SERPL CALCULATED.3IONS-SCNC: 6 MMOL/L (ref 4–13)
AST SERPL W P-5'-P-CCNC: 17 U/L (ref 13–39)
BILIRUB SERPL-MCNC: 0.86 MG/DL (ref 0.2–1)
BUN SERPL-MCNC: 14 MG/DL (ref 5–25)
CALCIUM SERPL-MCNC: 9.5 MG/DL (ref 8.4–10.2)
CHLORIDE SERPL-SCNC: 105 MMOL/L (ref 96–108)
CHOLEST SERPL-MCNC: 140 MG/DL (ref ?–200)
CO2 SERPL-SCNC: 29 MMOL/L (ref 21–32)
CREAT SERPL-MCNC: 0.94 MG/DL (ref 0.6–1.3)
GFR SERPL CREATININE-BSD FRML MDRD: 75 ML/MIN/1.73SQ M
GLUCOSE P FAST SERPL-MCNC: 89 MG/DL (ref 65–99)
HDLC SERPL-MCNC: 47 MG/DL
LDLC SERPL CALC-MCNC: 68 MG/DL (ref 0–100)
NONHDLC SERPL-MCNC: 93 MG/DL
POTASSIUM SERPL-SCNC: 4.6 MMOL/L (ref 3.5–5.3)
PROT SERPL-MCNC: 6.7 G/DL (ref 6.4–8.4)
SODIUM SERPL-SCNC: 140 MMOL/L (ref 135–147)
TRIGL SERPL-MCNC: 124 MG/DL (ref ?–150)

## 2025-02-18 PROCEDURE — 80061 LIPID PANEL: CPT

## 2025-02-18 PROCEDURE — 36415 COLL VENOUS BLD VENIPUNCTURE: CPT

## 2025-02-18 PROCEDURE — 80053 COMPREHEN METABOLIC PANEL: CPT

## 2025-02-18 PROCEDURE — 83036 HEMOGLOBIN GLYCOSYLATED A1C: CPT

## 2025-02-19 ENCOUNTER — RESULTS FOLLOW-UP (OUTPATIENT)
Dept: FAMILY MEDICINE CLINIC | Facility: CLINIC | Age: 80
End: 2025-02-19

## 2025-02-19 LAB
EST. AVERAGE GLUCOSE BLD GHB EST-MCNC: 128 MG/DL
HBA1C MFR BLD: 6.1 %

## 2025-03-03 ENCOUNTER — TELEPHONE (OUTPATIENT)
Age: 80
End: 2025-03-03

## 2025-03-18 DIAGNOSIS — I10 PRIMARY HYPERTENSION: ICD-10-CM

## 2025-03-18 RX ORDER — LOSARTAN POTASSIUM 25 MG/1
25 TABLET ORAL DAILY
Qty: 90 TABLET | Refills: 1 | Status: SHIPPED | OUTPATIENT
Start: 2025-03-18

## 2025-03-19 ENCOUNTER — RA CDI HCC (OUTPATIENT)
Dept: OTHER | Facility: HOSPITAL | Age: 80
End: 2025-03-19

## 2025-03-19 NOTE — PROGRESS NOTES
[id number]    Please review if this dx is applicable to the patient's condition and assess and document, if applicable in next visit    HCC coding opportunities          Chart Reviewed number of suggestions sent to Provider: 1     Patients Insurance     Medicare Insurance: [employer]

## 2025-03-20 ENCOUNTER — OFFICE VISIT (OUTPATIENT)
Dept: FAMILY MEDICINE CLINIC | Facility: CLINIC | Age: 80
End: 2025-03-20
Payer: COMMERCIAL

## 2025-03-20 VITALS
TEMPERATURE: 97.3 F | DIASTOLIC BLOOD PRESSURE: 76 MMHG | SYSTOLIC BLOOD PRESSURE: 138 MMHG | OXYGEN SATURATION: 96 % | HEIGHT: 68 IN | WEIGHT: 217.4 LBS | BODY MASS INDEX: 32.95 KG/M2 | HEART RATE: 117 BPM

## 2025-03-20 DIAGNOSIS — I10 PRIMARY HYPERTENSION: ICD-10-CM

## 2025-03-20 DIAGNOSIS — I50.32 CHRONIC DIASTOLIC HEART FAILURE WITH PRESERVED EJECTION FRACTION (HCC): ICD-10-CM

## 2025-03-20 DIAGNOSIS — R79.9 ABNORMAL FINDING OF BLOOD CHEMISTRY, UNSPECIFIED: ICD-10-CM

## 2025-03-20 DIAGNOSIS — Z00.00 MEDICARE ANNUAL WELLNESS VISIT, SUBSEQUENT: Primary | ICD-10-CM

## 2025-03-20 DIAGNOSIS — F32.2 MODERATELY SEVERE MAJOR DEPRESSION (HCC): ICD-10-CM

## 2025-03-20 DIAGNOSIS — R53.83 OTHER FATIGUE: ICD-10-CM

## 2025-03-20 DIAGNOSIS — Z79.899 OTHER LONG TERM (CURRENT) DRUG THERAPY: ICD-10-CM

## 2025-03-20 PROCEDURE — G0439 PPPS, SUBSEQ VISIT: HCPCS | Performed by: FAMILY MEDICINE

## 2025-03-20 NOTE — ASSESSMENT & PLAN NOTE
Wt Readings from Last 3 Encounters:   03/20/25 98.6 kg (217 lb 6.4 oz)   02/07/25 100 kg (220 lb 12.8 oz)   01/25/25 97.3 kg (214 lb 8.1 oz)

## 2025-03-20 NOTE — PROGRESS NOTES
Name: Hood Ojeda      : 1945      MRN: [id number]  Encounter Provider: Timmy Camacho MD  Encounter Date: 3/20/2025   Encounter department: [facility] PRIMARY CARE    Assessment & Plan  Medicare annual wellness visit, subsequent  Medicare annual wellness completed today.  Up-to-date on screenings.  Does complain of some chronic fatigue, believes it may be due to his stroke.  I will check labs as below that he will have done next few days to weeks.  Blood pressures slightly elevated today 138/76, continue BP regimen as prescribed.  Continue follow-up with cardiology for CHF.  Did screen positive for depression screening, believe this may be due to his stroke.  Does not believe that he needs medication at this time.  Continue to monitor.       Other fatigue    Orders:  •  CBC and differential; Future  •  Iron Panel (Includes Ferritin, Iron Sat%, Iron, and TIBC); Future  •  Vitamin B12; Future  •  TSH, 3rd generation with Free T4 reflex; Future    Abnormal finding of blood chemistry, unspecified    Orders:  •  Iron Panel (Includes Ferritin, Iron Sat%, Iron, and TIBC); Future    Other long term (current) drug therapy    Orders:  •  Vitamin B12; Future    Primary hypertension         Chronic diastolic heart failure with preserved ejection fraction (HCC)  Wt Readings from Last 3 Encounters:   25 98.6 kg (217 lb 6.4 oz)   25 100 kg (220 lb 12.8 oz)   25 97.3 kg (214 lb 8.1 oz)                  Moderately severe major depression (HCC)  Depression Screening Follow-up Plan: Patient's depression screening was positive with a PHQ-9 score of 18. Patient's depressive symptoms likely due to other medical condition. Would recommend treatment of underlying condition. Will continue to monitor at next office visit.            Preventive health issues were discussed with patient, and age appropriate screening tests were ordered as noted in patient's After Visit Summary. Personalized health  advice and appropriate referrals for health education or preventive services given if needed, as noted in patient's After Visit Summary.    History of Present Illness     HPI   Patient Care Team:  Timmy Camacho MD as PCP - General (Family Medicine)  Raghu Lay DO (Vascular Surgery)    Review of Systems   Constitutional:  Positive for fatigue. Negative for activity change, appetite change, chills and fever.   HENT:  Negative for congestion, rhinorrhea, sneezing and sore throat.    Eyes:  Negative for pain, discharge, redness and itching.   Respiratory:  Negative for cough, chest tightness, shortness of breath and wheezing.    Cardiovascular:  Negative for chest pain and palpitations.   Gastrointestinal:  Negative for abdominal pain, constipation, diarrhea, nausea and vomiting.   Musculoskeletal:  Negative for arthralgias, gait problem, myalgias and neck pain.   Skin:  Negative for rash.   Neurological:  Negative for dizziness, weakness, numbness and headaches.   Hematological:  Negative for adenopathy.   Psychiatric/Behavioral:  Negative for confusion and dysphoric mood. The patient is not nervous/anxious.    All other systems reviewed and are negative.    Medical History Reviewed by provider this encounter:  Tobacco  Allergies  Meds  Problems  Med Hx  Surg Hx  Fam Hx       Annual Wellness Visit Questionnaire   Edanna is here for his Subsequent Wellness visit. Last Medicare Wellness visit information reviewed, patient interviewed and updates made to the record today.      Health Risk Assessment:   Patient rates overall health as fair. Patient feels that their physical health rating is slightly worse. Patient is dissatisfied with their life. Eyesight was rated as same. Hearing was rated as same. Patient feels that their emotional and mental health rating is slightly worse. Patients states they are often angry. Patient states they are always unusually tired/fatigued. Pain experienced in the last 7  days has been none. Patient states that he has experienced no weight loss or gain in last 6 months.     Depression Screening:   PHQ-9 Score: 18      Fall Risk Screening:   In the past year, patient has experienced: no history of falling in past year      Home Safety:  Patient does not have trouble with stairs inside or outside of their home. Patient has working smoke alarms and has working carbon monoxide detector. Home safety hazards include: none.     Nutrition:   Current diet is Regular.     Medications:   Patient is not currently taking any over-the-counter supplements. Patient is able to manage medications.     Activities of Daily Living (ADLs)/Instrumental Activities of Daily Living (IADLs):   Walk and transfer into and out of bed and chair?: Yes  Dress and groom yourself?: Yes    Bathe or shower yourself?: Yes    Feed yourself? Yes  Do your laundry/housekeeping?: Yes  Manage your money, pay your bills and track your expenses?: Yes  Make your own meals?: Yes    Do your own shopping?: Yes    Previous Hospitalizations:   Any hospitalizations or ED visits within the last 12 months?: Yes    How many hospitalizations have you had in the last year?: 1-2    Advance Care Planning:   Living will: No    Durable POA for healthcare: Yes      PREVENTIVE SCREENINGS      Cardiovascular Screening:    General: Screening Not Indicated and History Lipid Disorder      Diabetes Screening:     General: Screening Current      Prostate Cancer Screening:    General: History Prostate Cancer and Screening Not Indicated      Osteoporosis Screening:    General: Screening Not Indicated      Abdominal Aortic Aneurysm (AAA) Screening:    Risk factors include: tobacco use        Lung Cancer Screening:     General: Screening Not Indicated      Hepatitis C Screening:    General: Screening Current    Screening, Brief Intervention, and Referral to Treatment (SBIRT)     Screening  Typical number of drinks in a day: 0  Typical number of drinks in a  "week: 0  Interpretation: Low risk drinking behavior.    Social Drivers of Health     Food Insecurity: No Food Insecurity (3/20/2025)    Hunger Vital Sign    • Worried About Running Out of Food in the Last Year: Never true    • Ran Out of Food in the Last Year: Never true   Transportation Needs: No Transportation Needs (3/20/2025)    PRAPARE - Transportation    • Lack of Transportation (Medical): No    • Lack of Transportation (Non-Medical): No   Housing Stability: Unknown (3/20/2025)    Housing Stability Vital Sign    • Unable to Pay for Housing in the Last Year: No    • Number of Times Moved in the Last Year: 0   Utilities: Not At Risk (3/20/2025)    [facility] Utilities    • Threatened with loss of utilities: No     No results found.    Objective   /76 (BP Location: Right arm, Patient Position: Sitting, Cuff Size: Large)   Pulse (!) 117   Temp (!) 97.3 °F (36.3 °C) (Temporal)   Ht 5' 8\" (1.727 m)   Wt 98.6 kg (217 lb 6.4 oz)   SpO2 96%   BMI 33.06 kg/m²     Physical Exam  Vitals reviewed.   Constitutional:       General: He is not in acute distress.     Appearance: Normal appearance. He is well-developed. He is not ill-appearing or toxic-appearing.   HENT:      Head: Normocephalic and atraumatic.      Right Ear: Tympanic membrane, ear canal and external ear normal. There is no impacted cerumen.      Left Ear: Tympanic membrane, ear canal and external ear normal. There is no impacted cerumen.      Nose: Nose normal. No congestion or rhinorrhea.      Mouth/Throat:      Mouth: Mucous membranes are moist.      Pharynx: Oropharynx is clear. No oropharyngeal exudate or posterior oropharyngeal erythema.   Eyes:      General: No scleral icterus.        Right eye: No discharge.         Left eye: No discharge.      Conjunctiva/sclera: Conjunctivae normal.      Pupils: Pupils are equal, round, and reactive to light.   Cardiovascular:      Rate and Rhythm: Normal rate and regular rhythm.      Pulses: Normal pulses.      " Heart sounds: Normal heart sounds. No murmur heard.  Pulmonary:      Effort: Pulmonary effort is normal. No respiratory distress.      Breath sounds: Normal breath sounds.   Abdominal:      General: There is no distension.      Palpations: Abdomen is soft.      Tenderness: There is no abdominal tenderness.   Musculoskeletal:         General: No tenderness. Normal range of motion.      Cervical back: Normal range of motion.   Skin:     General: Skin is warm and dry.      Capillary Refill: Capillary refill takes less than 2 seconds.      Findings: No rash.   Neurological:      General: No focal deficit present.      Mental Status: He is alert.      Motor: No weakness.   Psychiatric:         Mood and Affect: Mood normal.         Behavior: Behavior normal.         Depression Screening Follow-up Plan: Patient's depression screening was positive with a PHQ-9 score of 18. Patient's depressive symptoms likely due to other medical condition. Would recommend treatment of underlying condition. Will continue to monitor at next office visit.

## 2025-03-20 NOTE — PATIENT INSTRUCTIONS
"Patient Education     Depression in adults   The Basics   Written by the doctors and editors at [facility]   What is depression? -- Depression is a disorder that makes you sad, but it is different from normal sadness. Depression can make it hard for you to work, study, or do everyday tasks.  What causes depression? -- Depression is caused by problems with chemicals in the brain called \"neurotransmitters.\" Some people might be more likely to have depression if it runs in their family. Other things might also play a role, including hormones, certain health problems, medicines, stress, being mistreated as a child, family problems, and problems with friends or at school or work.  How do I know if I am depressed? -- People with depression feel down most of the time for at least 2 weeks. They also have at least 1 of these 2 symptoms:   They no longer enjoy or care about doing the things that they used to like to do.   They feel sad, down, hopeless, or cranky most of the day, almost every day.  People with depression can also have other symptoms. Examples include:   Changes in your appetite or weight. You might eat too little or too much, or gain or lose weight without trying.   Sleeping too much or too little   Feeling tired or like you have no energy   Feeling guilty, helpless, or like you are worth nothing   Trouble with concentration or memory   Acting restless or have trouble staying still, or moving or speaking more slowly than normal   Repeated thoughts of death or killing yourself  If you think that you might be depressed, see your doctor or nurse. Only someone trained in mental health can tell for sure if you are depressed.  How is depression diagnosed? -- Your doctor or nurse will do a physical exam, ask you questions, and might order tests. Depression can have a big impact on your life. Luckily, depression can be treated, and the sooner treatment is started, the better it works.  Get help right away if you are " "thinking of hurting or killing yourself! -- If you ever feel like you might hurt yourself or someone else, help is available:   In the [place], contact the 238 Suicide & Crisis Lifeline:   To speak to someone, call or text 036.   To talk to someone online, go to www.Pulse.org/chat.   Call your doctor or nurse, and tell them it is urgent.   Call for an ambulance (in the [place] and [place], call 9-1-1).   Go to the emergency department at the nearest hospital.  What are the treatments for depression? -- Your doctor or nurse will work with you to make a treatment plan. Treatment can include:   Helping you learn more about depression   Counseling (with a psychiatrist, psychologist, nurse, or )   Medicines that relieve depression   Creating a plan to limit access to items that you might use to harm yourself   Other treatments that pass magnetic waves or electricity into the brain  In addition to treatment, getting regular physical activity can also help you feel better.  People with depression that is not too severe can get better by taking medicines or talking with a counselor. People with severe depression usually need medicines to get better, and might also need to see a counselor.  Another treatment involves placing a device against the scalp to pass magnetic waves into the brain. This is called \"transcranial magnetic stimulation\" (\"TMS\"). Doctors might suggest TMS if medicines and counseling have not helped.  Some people with severe depression might need a treatment called \"electroconvulsive therapy\" (\"ECT\"). During ECT, doctors pass an electric current through a person's brain in a safe way.  When will I feel better? -- Most treatment options take a little while to start working.   Many people who take medicines start to feel better within 2 weeks, but it might be 4 to 8 weeks before the medicine has its full effect.   Many people who see a counselor start to feel better within a few weeks, but it might " take 8 to 10 weeks to get the greatest benefit.  If the first treatment you try does not help you, tell your doctor or nurse, but do not give up. Some people need to try different treatments or combinations of treatments before they find an approach that works. Your doctor, nurse, or counselor can work with you to find the treatment that is right for you. They can also help you figure out how to cope while you search for the right treatment or are waiting for your treatment to start working.  How do I decide which treatment to have? -- You and your doctor or nurse will need to work together to choose a treatment for you. Medicines might work a little faster than counseling. But medicines can also cause side effects. Plus, some people do not like the idea of taking medicine.  Seeing a counselor involves talking about your feelings. That is also hard for some people.  What if I take medicine for depression and I want to have a baby? -- Some depression medicines can cause problems for an unborn baby. But having untreated depression during pregnancy can also cause problems. If you want to get pregnant, tell your doctor but do not stop taking your medicines. Together, you can plan the safest way for you to have your baby.  It's also important to talk with your doctor if you want to breastfeed after your baby is born. Breastfeeding has lots of benefits for both mother and baby. Some depression medicines are safer than others to use while breastfeeding. But having untreated depression after giving birth can also cause problems, so do not stop taking your medicines. Your doctor can work with you to plan the safest way for you to feed your baby.  All topics are updated as new evidence becomes available and our peer review process is complete.  This topic retrieved from [employer] on: Mar 06, 2024.  Topic 77072 Version 22.0  Release: 32.2.4 - C32.64  © 2024 UpToDate, Inc. and/or its affiliates. All rights reserved.  figure 1:  "Mood disorders caused by problems in the brain     Mooddisorders, such as depression and bipolar disorder, are caused by problems with\"neurotransmitters.\" These are chemicals in the brain that can affect your emotions.Treatments for mood disorders seem to work by changing the levels of certainneurotransmitters.  Graphic 81473 Version 4.0  Consumer Information Use and Disclaimer   Disclaimer: This generalized information is a limited summary of diagnosis, treatment, and/or medication information. It is not meant to be comprehensive and should be used as a tool to help the user understand and/or assess potential diagnostic and treatment options. It does NOT include all information about conditions, treatments, medications, side effects, or risks that may apply to a specific patient. It is not intended to be medical advice or a substitute for the medical advice, diagnosis, or treatment of a health care provider based on the health care provider's examination and assessment of a patient's specific and unique circumstances. Patients must speak with a health care provider for complete information about their health, medical questions, and treatment options, including any risks or benefits regarding use of medications. This information does not endorse any treatments or medications as safe, effective, or approved for treating a specific patient. UpToDate, Inc. and its affiliates disclaim any warranty or liability relating to this information or the use thereof.The use of this information is governed by the Terms of Use, available at https://www.[employer].com/en/know/clinical-effectiveness-terms. 2024© UpToDate, Inc. and its affiliates and/or licensors. All rights reserved.  Copyright   © 2024 UpToDate, Inc. and/or its affiliates. All rights reserved.    "

## 2025-03-31 ENCOUNTER — RESULTS FOLLOW-UP (OUTPATIENT)
Dept: FAMILY MEDICINE CLINIC | Facility: CLINIC | Age: 80
End: 2025-03-31

## 2025-03-31 ENCOUNTER — APPOINTMENT (OUTPATIENT)
Dept: LAB | Facility: HOSPITAL | Age: 80
End: 2025-03-31
Payer: COMMERCIAL

## 2025-03-31 DIAGNOSIS — R79.9 ABNORMAL FINDING OF BLOOD CHEMISTRY, UNSPECIFIED: ICD-10-CM

## 2025-03-31 DIAGNOSIS — Z79.899 OTHER LONG TERM (CURRENT) DRUG THERAPY: ICD-10-CM

## 2025-03-31 DIAGNOSIS — R53.83 OTHER FATIGUE: ICD-10-CM

## 2025-03-31 LAB
BASOPHILS # BLD AUTO: 0.03 THOUSANDS/ÂΜL (ref 0–0.1)
BASOPHILS NFR BLD AUTO: 0 % (ref 0–1)
EOSINOPHIL # BLD AUTO: 0.08 THOUSAND/ÂΜL (ref 0–0.61)
EOSINOPHIL NFR BLD AUTO: 1 % (ref 0–6)
ERYTHROCYTE [DISTWIDTH] IN BLOOD BY AUTOMATED COUNT: 13.8 % (ref 11.6–15.1)
FERRITIN SERPL-MCNC: 91 NG/ML (ref 24–336)
HCT VFR BLD AUTO: 45.7 % (ref 36.5–49.3)
HGB BLD-MCNC: 14.5 G/DL (ref 12–17)
IMM GRANULOCYTES # BLD AUTO: 0.02 THOUSAND/UL (ref 0–0.2)
IMM GRANULOCYTES NFR BLD AUTO: 0 % (ref 0–2)
IRON SATN MFR SERPL: 23 % (ref 15–50)
IRON SERPL-MCNC: 95 UG/DL (ref 50–212)
LYMPHOCYTES # BLD AUTO: 2.37 THOUSANDS/ÂΜL (ref 0.6–4.47)
LYMPHOCYTES NFR BLD AUTO: 29 % (ref 14–44)
MCH RBC QN AUTO: 29.1 PG (ref 26.8–34.3)
MCHC RBC AUTO-ENTMCNC: 31.7 G/DL (ref 31.4–37.4)
MCV RBC AUTO: 92 FL (ref 82–98)
MONOCYTES # BLD AUTO: 0.73 THOUSAND/ÂΜL (ref 0.17–1.22)
MONOCYTES NFR BLD AUTO: 9 % (ref 4–12)
NEUTROPHILS # BLD AUTO: 5.06 THOUSANDS/ÂΜL (ref 1.85–7.62)
NEUTS SEG NFR BLD AUTO: 61 % (ref 43–75)
NRBC BLD AUTO-RTO: 0 /100 WBCS
PLATELET # BLD AUTO: 188 THOUSANDS/UL (ref 149–390)
PMV BLD AUTO: 10.4 FL (ref 8.9–12.7)
RBC # BLD AUTO: 4.98 MILLION/UL (ref 3.88–5.62)
TIBC SERPL-MCNC: 410.2 UG/DL (ref 250–450)
TRANSFERRIN SERPL-MCNC: 293 MG/DL (ref 203–362)
TSH SERPL DL<=0.05 MIU/L-ACNC: 2.97 UIU/ML (ref 0.45–4.5)
UIBC SERPL-MCNC: 315 UG/DL (ref 155–355)
VIT B12 SERPL-MCNC: 239 PG/ML (ref 180–914)
WBC # BLD AUTO: 8.29 THOUSAND/UL (ref 4.31–10.16)

## 2025-03-31 PROCEDURE — 83540 ASSAY OF IRON: CPT

## 2025-03-31 PROCEDURE — 82728 ASSAY OF FERRITIN: CPT

## 2025-03-31 PROCEDURE — 84443 ASSAY THYROID STIM HORMONE: CPT

## 2025-03-31 PROCEDURE — 82607 VITAMIN B-12: CPT

## 2025-03-31 PROCEDURE — 36415 COLL VENOUS BLD VENIPUNCTURE: CPT

## 2025-03-31 PROCEDURE — 83550 IRON BINDING TEST: CPT

## 2025-03-31 PROCEDURE — 85025 COMPLETE CBC W/AUTO DIFF WBC: CPT

## 2025-04-01 NOTE — RESULT ENCOUNTER NOTE
Doug pt. All blood work normal. Fatigue can be source of depression. Please schedule 1 month follow up with me for depression

## 2025-04-01 NOTE — TELEPHONE ENCOUNTER
Called and spoke with patient wife to advise results.     Patient is scheduled for 5/1/25 with Angela

## 2025-04-01 NOTE — TELEPHONE ENCOUNTER
----- Message from Angela Perry PA-C sent at 3/31/2025  8:39 PM EDT -----  Doug pt. All blood work normal. Fatigue can be source of depression. Please schedule 1 month follow up with me for depression

## 2025-05-01 ENCOUNTER — OFFICE VISIT (OUTPATIENT)
Dept: FAMILY MEDICINE CLINIC | Facility: CLINIC | Age: 80
End: 2025-05-01
Payer: COMMERCIAL

## 2025-05-01 VITALS
TEMPERATURE: 98.3 F | OXYGEN SATURATION: 96 % | SYSTOLIC BLOOD PRESSURE: 120 MMHG | BODY MASS INDEX: 31.77 KG/M2 | HEART RATE: 56 BPM | HEIGHT: 68 IN | WEIGHT: 209.6 LBS | DIASTOLIC BLOOD PRESSURE: 76 MMHG

## 2025-05-01 DIAGNOSIS — F33.9 DEPRESSION, RECURRENT (HCC): Primary | ICD-10-CM

## 2025-05-01 DIAGNOSIS — R29.898 WEAKNESS OF BOTH HANDS: ICD-10-CM

## 2025-05-01 PROBLEM — B35.1 ONYCHOMYCOSIS OF TOENAIL: Status: ACTIVE | Noted: 2023-12-27

## 2025-05-01 PROBLEM — L60.2 NAIL HYPERTROPHY: Status: ACTIVE | Noted: 2023-12-27

## 2025-05-01 PROBLEM — L84 CALLUS OF FOOT: Status: ACTIVE | Noted: 2023-12-27

## 2025-05-01 PROBLEM — I73.9 PERIPHERAL VASCULAR DISEASE (HCC): Status: ACTIVE | Noted: 2023-12-27

## 2025-05-01 PROCEDURE — 99214 OFFICE O/P EST MOD 30 MIN: CPT

## 2025-05-01 PROCEDURE — G2211 COMPLEX E/M VISIT ADD ON: HCPCS

## 2025-05-01 RX ORDER — ESCITALOPRAM OXALATE 5 MG/1
5 TABLET ORAL DAILY
Qty: 30 TABLET | Refills: 1 | Status: SHIPPED | OUTPATIENT
Start: 2025-05-01

## 2025-05-01 NOTE — PROGRESS NOTES
Name: Hood Ojeda      : 1945      MRN: [id number]  Encounter Provider: Angela Perry PA-C  Encounter Date: 2025   Encounter department: [facility] PRIMARY CARE  :  Assessment & Plan  Depression, recurrent (HCC)  Chronic, fair control  Here for 1 m f/u, PHQ score was 9, it is 6 today  -Discussed starting on lexapro 5 mg. Educated patient on medication usage, side effects, administration, risks and benefits. Patient stated understanding.  -Will follow up in 1 month    Orders:  •  escitalopram (LEXAPRO) 5 mg tablet; Take 1 tablet (5 mg total) by mouth daily    Weakness of both hands  Acute on chronic, symptomatic   Neg neuro exam today  Weakness is in the median nerve distribution, highly suspect carpal tunnel syndrome b/l  Negative Phalen's sign  -Will order EMG  Orders:  •  EMG; Future           History of Present Illness {?Quick Links Encounters * My Last Note * Last Note in Specialty * Snapshot * Since Last Visit * History :44480}  Patient here for follow-up depression.  Patient was seen here in office 1 month ago and his PHQ score was 9.  He states that he had a hard time adjusting from his stroke 4 years ago and did not want to start any medication at that point.  He was brought in today and states that he still has motivation and pleasure from doing activities that he likes.  He just states that he is more fatigued than normal he did have a thorough workup of blood work and it was all normal.  Vitamin levels electrolyte no signs of anemia, they were all normal.  He states that when he goes out to do things like mow the lawn he has to come in after 15 minutes because he is just so tired.  His partner states that he is also more irritable than normal ever since his stroke 4 years ago.  She also states that he has no problems falling asleep.  After his PT sessions he will go straight to sleep and he takes more naps than normal.  Patient states that he does not have any  "thoughts of hurting himself or others.  He denies feeling things like sadness, loneliness, guilt.  He denies ruminating thoughts or spiraling down pupils.    He also notes weakness in his .  He states it mainly in his thumb and pointer finger bilaterally.  It comes and goes.  He states that he has had weakness since his stroke but never in just these 2 fingers before.  He has no history of carpal tunnel.  He denies any numbness tingling.  He states that the weakness is worse when he tries to drive and hold the steering well.      Review of Systems   Constitutional:  Negative for chills and fever.   HENT:  Negative for sore throat.    Respiratory:  Negative for shortness of breath and wheezing.    Cardiovascular:  Negative for chest pain, palpitations and leg swelling.   Gastrointestinal:  Negative for abdominal pain, blood in stool, constipation, diarrhea, nausea and vomiting.   Neurological:  Positive for facial asymmetry (chronic left eye twitching, sometimes drooping, from stroke) and weakness. Negative for dizziness, tremors, syncope, speech difficulty and headaches.   Psychiatric/Behavioral:  Negative for sleep disturbance.        Objective {?Quick Links Trend Vitals * Enter New Vitals * Results Review * Timeline (Adult) * Labs * Imaging * Cardiology * Procedures * Lung Cancer Screening * Surgical eConsent :[id number]}  /76 (BP Location: Left arm, Patient Position: Sitting, Cuff Size: Standard)   Pulse 56   Temp 98.3 °F (36.8 °C) (Tympanic)   Ht 5' 8.35\" (1.736 m)   Wt 95.1 kg (209 lb 9.6 oz)   SpO2 96%   BMI 31.55 kg/m²      Physical Exam  Vitals and nursing note reviewed.   Constitutional:       General: He is not in acute distress.     Appearance: He is well-developed.   HENT:      Head: Normocephalic and atraumatic.   Eyes:      General: No visual field deficit.     Conjunctiva/sclera: Conjunctivae normal.   Cardiovascular:      Rate and Rhythm: Normal rate and regular rhythm.      Pulses: Normal " pulses.      Heart sounds: No murmur heard.  Pulmonary:      Effort: Pulmonary effort is normal. No respiratory distress.      Breath sounds: Normal breath sounds.   Musculoskeletal:         General: No swelling.      Cervical back: Neck supple.   Skin:     General: Skin is warm and dry.      Capillary Refill: Capillary refill takes less than 2 seconds.   Neurological:      General: No focal deficit present.      Mental Status: He is alert and oriented to person, place, and time.      Cranial Nerves: Cranial nerves 2-12 are intact. No cranial nerve deficit or facial asymmetry.      Sensory: Sensation is intact.      Motor: Motor function is intact. No weakness, tremor, atrophy or abnormal muscle tone.      Coordination: Coordination is intact. Romberg sign negative. Finger-Nose-Finger Test and Heel to Shin Test normal.      Gait: Gait is intact. Gait normal.   Psychiatric:         Mood and Affect: Mood normal.       Angela Perry PA-C

## 2025-05-01 NOTE — ASSESSMENT & PLAN NOTE
Acute on chronic, symptomatic   Neg neuro exam today  Weakness is in the median nerve distribution, highly suspect carpal tunnel syndrome b/l  Negative Phalen's sign  -Will order EMG  Orders:  •  EMG; Future

## 2025-05-01 NOTE — ASSESSMENT & PLAN NOTE
Chronic, fair control  Here for 1 m f/u, PHQ score was 9, it is 6 today  -Discussed starting on lexapro 5 mg. Educated patient on medication usage, side effects, administration, risks and benefits. Patient stated understanding.  -Will follow up in 1 month    Orders:  •  escitalopram (LEXAPRO) 5 mg tablet; Take 1 tablet (5 mg total) by mouth daily

## 2025-05-16 ENCOUNTER — TELEPHONE (OUTPATIENT)
Dept: SLEEP CENTER | Facility: CLINIC | Age: 80
End: 2025-05-16

## 2025-05-16 NOTE — TELEPHONE ENCOUNTER
Patient's wife called in saying that she never received the notice for a rescheduled apportionment that was made in September. I changed his appointment to may 28th at 1:30.

## 2025-05-28 ENCOUNTER — OFFICE VISIT (OUTPATIENT)
Dept: SLEEP CENTER | Facility: CLINIC | Age: 80
End: 2025-05-28
Payer: COMMERCIAL

## 2025-05-28 VITALS
BODY MASS INDEX: 32.55 KG/M2 | WEIGHT: 214.8 LBS | HEIGHT: 68 IN | DIASTOLIC BLOOD PRESSURE: 60 MMHG | SYSTOLIC BLOOD PRESSURE: 122 MMHG

## 2025-05-28 DIAGNOSIS — I69.30 SEQUELA OF LACUNAR INFARCTION: ICD-10-CM

## 2025-05-28 DIAGNOSIS — G47.33 OBSTRUCTIVE SLEEP APNEA TREATED WITH CONTINUOUS POSITIVE AIRWAY PRESSURE (CPAP): Primary | ICD-10-CM

## 2025-05-28 PROCEDURE — 99214 OFFICE O/P EST MOD 30 MIN: CPT | Performed by: STUDENT IN AN ORGANIZED HEALTH CARE EDUCATION/TRAINING PROGRAM

## 2025-05-28 PROCEDURE — G2211 COMPLEX E/M VISIT ADD ON: HCPCS | Performed by: STUDENT IN AN ORGANIZED HEALTH CARE EDUCATION/TRAINING PROGRAM

## 2025-05-28 NOTE — PROGRESS NOTES
Name: Hood Ojeda      : 1945      MRN: [id number]  Encounter Provider: Chase Cuellar DO  Encounter Date: 2025   Encounter department: [facility] SLEEP MEDICINE [place]  :  Assessment & Plan  Obstructive sleep apnea treated with continuous positive airway pressure (CPAP)  Ed is a very pleasant 79-year-old gentleman with a PMHx of HTN, PVD, chronic diastolic heart failure with preserved ejection fraction (EF 70% 2023), CAD, bilateral sensorineural hearing loss, depression, prior stroke, HLD, obesity, HLD who presents in follow up for ARTURO (ADARSH 10.6, O2 mariia 87% 2022).  He is accompanied by his wife today.  His residual AHI appears to have been corrected effectively following the pressure adjustment made following his most recent PAP titration study, and he endorses ongoing significant benefit.  He does have occasional mask discomfort.    Compliance report reviewed and analyzed  Continue AutoPAP at settings of 13-15 cmH2O  Order placed for a formal mask fitting   Prescription for new supplies ordered today  Reviewed CMS/insurance requirements and resupply guidelines  Information provided on the above as well as general maintenance steps  Recommended maintaining good Sleep Hygiene    Orders:    Mask fitting only; Future    PAP DME Resupply/Reorder    Sequela of lacunar infarction    Reviewed the importance of treating SDB on stroke risk reduction  Reviewed other lifestyle factors (blood pressure/cholesterol/sugar control, mediterranean diet, etc.) that function as primary stroke prevention  Continue appropriate medications per patient's neurologist and/or PCP for secondary stroke prevention    Orders:    Mask fitting only; Future    PAP DME Resupply/Reorder      Follow-up:  He will Return in about 1 year (around 2026).      ________________________________________________________________________________________________    Per Last Visit Note (Date: 2024):  Ed is a very pleasant  78-year-old gentleman with a PMHx of who presents in follow up for obstructive sleep apnea (AHI of 6.9 and oxygen mariia of 87% 8/2022).  He overall is doing fairly well, endorsing that it is quite beneficial, although he still has residual daytime sleepiness.  He does tell us that he sleeps in a recliner, as if he lays on his side or sleep in a certain position, his fingers will tingle and potentially go numb on that side.  He has not yet had this evaluated in depth per his report for us today.  His only other concern is irritation at the bridge of his nose from his mask.  Upon review of the compliance download, he does have a slightly supratherapeutic AHI, the majority of which appear to be central in nature; he has had several pressure changes in the past, however these residual AHI's with the majority being central events have been persistent and largely unchanged despite pressure adjustments.     For now, he is to continue AutoPap at settings of 9-11.5 cm H2O  I have placed an order for a mask fitting to help ensure that he has the best mask for his facial structure and comfort.  I have ordered a PAP titration study to ensure that his pressure settings are appropriate and that he is not dealing with persistent treatment emergent central apneas that may require ASV.  I also encouraged him to discuss the possibility of a neurology referral or even MRI C-spine or EMG/NCS with his PCP to evaluate these upper extremity symptoms.  Prescription for new supplies ordered today  Reviewed CMS/insurance requirements and resupply guidelines  Information provided on the above as well as general maintenance steps  We have scheduled follow-up for ~6 months      Sleep/Other Studies:  -HSAT 08/29/2022: TRT: 479 minutes, ADARSH: 10.6, O2 mariia: 87%      -PAP Titration Study with RBD Montage 10/3/2022: Titration started at 5 cmH2O, titrated to 13 cmH2O. Best pressure noted to be 13 cmH2O. no abnormal REM sleep behavior was noted on  "that study.     -PAP Titration Study 4/30/2024: Titration started at 4 cmH2O, titrated to 13 cmH2O. Best pressure noted to be 13 cmH2O.     -Echocardiogram 2/17/2023: Ejection fraction 70%.    ________________________________________________________________________________________________      Interval History: Hood Ojeda is a 79 y.o. male with a PMHx of HTN, PVD, chronic diastolic heart failure with preserved ejection fraction (EF 70% 2/2023), CAD, bilateral sensorineural hearing loss, depression, prior stroke, HLD, obesity, HLD who presents in follow up for ARTURO (ADARSH 10.6, O2 mariia 87% 8/2022).      SDB:  -Current experience with PAP Therapy: Still beneficial  -Mask type: Full-face mask  -Difficulties with mask: Some discomfort at times. Did not do a mask fitting after last visit.   -Device: React Smita G3; received ~10/2022.  -Difficulties with device: Denies  -DME: [employer]  -Compliance:                SLEEP SCHEDULE:  Bedtime: 2000   Time it takes to fall sleep: <15 minutes  Wake up Time: 0500   Number of times patient wakes up per night: 0-1  Reason (s) why patient wakes up during the night: Restroom   Naps: \"Always;\" daily for ~2 hours, maybe a second later on. Sometimes uses CPAP.  Estimated total sleep time ( in a 24 hour period of time): ~12 hours       Changes to PMH, PSH, SH: Denies       Patient accompanied at this vist by : wife      Sitting and reading: High chance of dozing  Watching TV: High chance of dozing  Sitting, inactive in a public place (e.g. a theatre or a meeting): Slight chance of dozing  As a passenger in a car for an hour without a break: Would never doze  Lying down to rest in the afternoon when circumstances permit: High chance of dozing  Sitting and talking to someone: Would never doze  Sitting quietly after a lunch without alcohol: Would never doze  In a car, while stopped for a few minutes in traffic: Would never doze  Total score: 10     Review of Systems  Pertinent " "positives/negatives included in HPI and also as noted:     Medications Ordered Prior to Encounter[1]   Objective   /60   Ht 5' 8.35\" (1.736 m)   Wt 97.4 kg (214 lb 12.8 oz)   BMI 32.33 kg/m²        Physical Exam  Visit Vitals  /60   Ht 5' 8.35\" (1.736 m)   Wt 97.4 kg (214 lb 12.8 oz)   BMI 32.33 kg/m²   Smoking Status Former   BSA 2.11 m²     PHYSICAL EXAMINATION:  Vital Signs: /60   Ht 5' 8.35\" (1.736 m)   Wt 97.4 kg (214 lb 12.8 oz)   BMI 32.33 kg/m²     Constitutional: NAD, well appearing   Mental Status: AAOx3  Skin: Warm, dry, no rashes noted   Eyes: PERRL, normal conjunctiva  Chest: No evidence of respiratory distress, no accessory muscle use; no evidence of peripheral cyanosis  Abdomen: Soft, NT/ND  Extremities: No digital clubbing or pedal edema  Neuro: Strength 5/5 throughout, sensation grossly intact      Data  Lab Results   Component Value Date    HGB 14.5 03/31/2025    HCT 45.7 03/31/2025    MCV 92 03/31/2025      Lab Results   Component Value Date    CALCIUM 9.5 02/18/2025    K 4.6 02/18/2025    CO2 29 02/18/2025     02/18/2025    BUN 14 02/18/2025    CREATININE 0.94 02/18/2025     Lab Results   Component Value Date    IRON 95 03/31/2025    TIBC 410.2 03/31/2025    FERRITIN 91 03/31/2025     Lab Results   Component Value Date    AST 17 02/18/2025    ALT 20 02/18/2025         Electronically signed by:    Chase Cuellar DO  Board-Certified Neurology and Sleep Medicine  [facility]  05/28/25         [1]   Current Outpatient Medications on File Prior to Visit   Medication Sig Dispense Refill    acetaminophen (TYLENOL) 325 mg tablet Take 650 mg by mouth every 6 (six) hours as needed for mild pain As needed      amLODIPine (NORVASC) 10 mg tablet TAKE 1 TABLET BY MOUTH EVERY DAY 90 tablet 1    atorvastatin (LIPITOR) 40 mg tablet TAKE 1 TABLET BY MOUTH EVERY DAY WITH DINNER 90 tablet 1    clopidogrel (PLAVIX) 75 mg tablet TAKE 1 TABLET BY MOUTH EVERY DAY " 90 tablet 1    escitalopram (LEXAPRO) 5 mg tablet Take 1 tablet (5 mg total) by mouth daily 30 tablet 1    furosemide (LASIX) 20 mg tablet TAKE 1 TABLET (20 MG TOTAL) BY MOUTH EVERY OTHER DAY TAKE IT IN THE MORNING. 45 tablet 1    losartan (COZAAR) 25 mg tablet TAKE 1 TABLET (25 MG TOTAL) BY MOUTH DAILY. 90 tablet 1    montelukast (SINGULAIR) 10 mg tablet TAKE 1 TABLET BY MOUTH EVERYDAY AT BEDTIME 90 tablet 1    spironolactone (ALDACTONE) 25 mg tablet TAKE 1/2 TABLET BY MOUTH EVERY DAY 45 tablet 1     No current facility-administered medications on file prior to visit.

## 2025-05-28 NOTE — ASSESSMENT & PLAN NOTE
Reviewed the importance of treating SDB on stroke risk reduction  Reviewed other lifestyle factors (blood pressure/cholesterol/sugar control, mediterranean diet, etc.) that function as primary stroke prevention  Continue appropriate medications per patient's neurologist and/or PCP for secondary stroke prevention    Orders:    Mask fitting only; Future    PAP DME Resupply/Reorder

## 2025-05-28 NOTE — ASSESSMENT & PLAN NOTE
Ed is a very pleasant 79-year-old gentleman with a PMHx of HTN, PVD, chronic diastolic heart failure with preserved ejection fraction (EF 70% 2/2023), CAD, bilateral sensorineural hearing loss, depression, prior stroke, HLD, obesity, HLD who presents in follow up for ARTURO (ADARSH 10.6, O2 mariia 87% 8/2022).  He is accompanied by his wife today.  His residual AHI appears to have been corrected effectively following the pressure adjustment made following his most recent PAP titration study, and he endorses ongoing significant benefit.  He does have occasional mask discomfort.    Compliance report reviewed and analyzed  Continue AutoPAP at settings of 13-15 cmH2O  Order placed for a formal mask fitting   Prescription for new supplies ordered today  Reviewed CMS/insurance requirements and resupply guidelines  Information provided on the above as well as general maintenance steps  Recommended maintaining good Sleep Hygiene    Orders:    Mask fitting only; Future    PAP DME Resupply/Reorder

## 2025-05-28 NOTE — PATIENT INSTRUCTIONS
Plan:  Continue AutoPAP at settings of 13-15 cmH2O  Remember to clean your mask and equipment regularly, as directed.  You should be eligible for new supplies approximately every 3-6 months, depending on your insurance coverage. Contact your Durable Medical Equipment (DME) company for new supplies as needed.  Practice good Sleep Hygiene, as outlined below.  I am ordering a formal mask fitting appointment; this is an appointment with your DME (Durable Medical Equipment company) to ensure that you have the optimal mask and fit for your face structure. This appointment can also serve as a face-to-face meeting with a DME representative to discuss technical concerns you may be having.    Follow up in 12 months.      General PAP Information:  Care and Maintenance  Headgear should be washed as needed. Daily inspection and weekly washings are recommended. Do not disassemble the straps. Machine wash in warm water, making sure to attach Velcro hooks and tabs before washing. Line dry or machine dry on a low setting.  Masks should be washed every day. Daily inspection is recommended. Leave the mask and tubing attached. Gently wash the mask with a soft cloth using warm water and mild detergent, concentrating on the mask cushion flaps. DO NOT use alcohol or bleach. Rinse thoroughly and air dry.  Tubing and headgear should be washed weekly. Daily inspection is recommended. Wash in warm water and mild detergent and rinse thoroughly. Hook the tubing to the machine and blow until dry.  Humidifier should be washed daily and filled with DISTILLED water before use. Wash with warm water and mild detergent. Rinse thoroughly and air dry.  Disposable filters should be replaced once a month. Wash reusable foam filters with warm water and mild detergent at least once a month. Rinse thoroughly and dry with paper towels.  Avoid  that contain fragrance or conditioners, as these will leave a residue.  NEVER iron any soft goods.      CMS  Requirements    Your insurance requires a face-to-face follow up visit within a 31-90 day period after starting CPAP.  Your insurance requires compliance with CPAP, which is at least 4 hours per night for 70% of the time. This must be done over a 30 day period and must occur within the initial 31-90 day period after starting CPAP.  Your insurance also requires at least yearly follow ups to continue to pay for CPAP supplies.       PAP Supply Guidelines    Below are the guidelines for reordering your supplies. You will be responsible for your deductible, co payments, and out of pocket expenses.    Item Frequency   Nasal Mask (no headgear) 1 every 3 months   Nasal Mask Cushion 1 every 2 weeks   Full Face Mask (no headgear) 1 every 3 months   Full Face Mask Cushion 1 every month   Nasal Pillows Cushion 1 every 2 weeks   Headgear 1 every 6 months   hin Strap 1 every 6 months   jessee 1 every 3 months   Filters: Reusable 1 every 6 months   Filters: Disposable 1 every 2 weeks   Humidifier Chamber(disposable) 1 every 6 months           Good Sleep Hygiene  Wake up at the same time every day, even on the weekends.  Use your bed for sleep and intimacy only.  If you have been in bed awake for 30 minutes, get up and leave the bedroom. Choose a dull activity not involving a blue screen (TV, computer, handheld devices). Go back to bed when you feel sleepy.  Avoid caffeine, nicotine and alcohol before you go to bed.  Avoid large meals before you go to bed.  Avoid using screens (computers, tablets, smartphones, etc.) for at least 1 hour before bedtime  Exercise regularly, but do not exercise right before you go to bed.  Avoid daytime naps. If you do take a nap, sleep for 20-40 minutes, and not after 2pm.

## 2025-05-29 ENCOUNTER — TELEPHONE (OUTPATIENT)
Dept: SLEEP CENTER | Facility: CLINIC | Age: 80
End: 2025-05-29

## 2025-06-03 ENCOUNTER — OFFICE VISIT (OUTPATIENT)
Dept: FAMILY MEDICINE CLINIC | Facility: CLINIC | Age: 80
End: 2025-06-03
Payer: COMMERCIAL

## 2025-06-03 VITALS
DIASTOLIC BLOOD PRESSURE: 72 MMHG | HEART RATE: 74 BPM | SYSTOLIC BLOOD PRESSURE: 126 MMHG | BODY MASS INDEX: 30.96 KG/M2 | WEIGHT: 209 LBS | HEIGHT: 69 IN | TEMPERATURE: 98 F | OXYGEN SATURATION: 96 %

## 2025-06-03 DIAGNOSIS — F33.9 DEPRESSION, RECURRENT (HCC): Primary | ICD-10-CM

## 2025-06-03 DIAGNOSIS — E78.2 MIXED HYPERLIPIDEMIA: ICD-10-CM

## 2025-06-03 DIAGNOSIS — I73.9 PERIPHERAL VASCULAR DISEASE (HCC): ICD-10-CM

## 2025-06-03 DIAGNOSIS — I50.32 CHRONIC DIASTOLIC HEART FAILURE WITH PRESERVED EJECTION FRACTION (HCC): ICD-10-CM

## 2025-06-03 DIAGNOSIS — R73.03 PREDIABETES: ICD-10-CM

## 2025-06-03 PROCEDURE — 99214 OFFICE O/P EST MOD 30 MIN: CPT

## 2025-06-03 PROCEDURE — G2211 COMPLEX E/M VISIT ADD ON: HCPCS

## 2025-06-03 NOTE — ASSESSMENT & PLAN NOTE
Wt Readings from Last 3 Encounters:   06/03/25 94.8 kg (209 lb)   05/28/25 97.4 kg (214 lb 12.8 oz)   05/01/25 95.1 kg (209 lb 9.6 oz)     Chronic, controlled  Follows with cardiologist

## 2025-06-03 NOTE — ASSESSMENT & PLAN NOTE
Orders:  •  Lipid panel; Future  •  Comprehensive metabolic panel; Future  •  CBC and differential; Future

## 2025-06-03 NOTE — ASSESSMENT & PLAN NOTE
Acute on chronic, symptomatic  2+Edema b/l LE, not pitting  Did see cardiologist a few months ago and had trace swelling in the legs, cardiologist's note states most likely due to peripheral vascular disease rather than heart failure  - Recommend compression stockings during the day take off at night  -Recommend elevation of legs

## 2025-06-03 NOTE — PROGRESS NOTES
Name: Hood Ojeda      : 1945      MRN: [id number]  Encounter Provider: Angela Perry PA-C  Encounter Date: 6/3/2025   Encounter department: [facility] PRIMARY CARE  :  Assessment & Plan  Depression, recurrent (HCC)  Chronic, controlled  Symptoms have greatly improved  - Continue with Lexapro 5 mg daily         Peripheral vascular disease (HCC)  Acute on chronic, symptomatic  2+Edema b/l LE, not pitting  Did see cardiologist a few months ago and had trace swelling in the legs, cardiologist's note states most likely due to peripheral vascular disease rather than heart failure  - Recommend compression stockings during the day take off at night  -Recommend elevation of legs       Chronic diastolic heart failure with preserved ejection fraction (HCC)  Wt Readings from Last 3 Encounters:   25 94.8 kg (209 lb)   25 97.4 kg (214 lb 12.8 oz)   25 95.1 kg (209 lb 9.6 oz)     Chronic, controlled  Follows with cardiologist               Prediabetes    Orders:  •  Hemoglobin A1C; Future    Mixed hyperlipidemia    Orders:  •  Lipid panel; Future  •  Comprehensive metabolic panel; Future  •  CBC and differential; Future           History of Present Illness {?Quick Links Encounters * My Last Note * Last Note in Specialty * Snapshot * Since Last Visit * History :81749}  Patient here with wife for follow-up depression  She states that he is has less mood swings, is less cranky, is not staring off into space is often      Review of Systems   Constitutional:  Negative for chills and fever.   HENT:  Negative for sore throat.    Respiratory:  Negative for cough, shortness of breath and wheezing.    Cardiovascular:  Negative for chest pain, palpitations and leg swelling.   Gastrointestinal:  Negative for abdominal pain, blood in stool, constipation, diarrhea, nausea and vomiting.   Neurological:  Negative for headaches.   Psychiatric/Behavioral:  Negative for agitation and  "self-injury.        Objective {?Quick Links Trend Vitals * Enter New Vitals * Results Review * Timeline (Adult) * Labs * Imaging * Cardiology * Procedures * Lung Cancer Screening * Surgical eConsent :01340}  /72 (BP Location: Right arm, Patient Position: Sitting, Cuff Size: Standard)   Pulse 74   Temp 98 °F (36.7 °C) (Tympanic)   Ht 5' 8.62\" (1.743 m)   Wt 94.8 kg (209 lb)   SpO2 96%   BMI 31.20 kg/m²      Physical Exam  Vitals and nursing note reviewed.   Constitutional:       General: He is not in acute distress.     Appearance: He is well-developed.   HENT:      Head: Normocephalic and atraumatic.     Eyes:      Conjunctiva/sclera: Conjunctivae normal.       Cardiovascular:      Rate and Rhythm: Normal rate and regular rhythm.      Pulses: Normal pulses.      Heart sounds: Murmur heard.   Pulmonary:      Effort: Pulmonary effort is normal. No respiratory distress.      Breath sounds: Normal breath sounds.   Abdominal:      Palpations: Abdomen is soft.      Tenderness: There is no abdominal tenderness.     Musculoskeletal:         General: No swelling.      Cervical back: Neck supple.      Right lower le+ Edema present.      Left lower le+ Edema present.     Skin:     General: Skin is warm and dry.      Capillary Refill: Capillary refill takes less than 2 seconds.     Neurological:      Mental Status: He is alert.     Psychiatric:         Mood and Affect: Mood normal.       Angela Perry PA-C    "

## 2025-06-16 DIAGNOSIS — J31.0 CHRONIC RHINITIS: ICD-10-CM

## 2025-06-16 RX ORDER — MONTELUKAST SODIUM 10 MG/1
10 TABLET ORAL
Qty: 90 TABLET | Refills: 1 | Status: SHIPPED | OUTPATIENT
Start: 2025-06-16

## 2025-06-16 NOTE — TELEPHONE ENCOUNTER
Requested medication(s) are due for refill today: Yes  **If antibiotic or given during sick visit, contact patient to discuss current symptoms.   **Confirm prescribing provider    LOV:  6/3/25  **If longer then 1 year, contact patient to schedule annual PRIOR to refilling. Once scheduled, adjust refill for 30 days, no refills.  **Update CareEverywhere to confirm not being seen elsewhere    NOV:  12/18/25    Is patient due for annual visit: No  **If future appointment, adjust to annual/follow up.  ** No appointment call to schedule annual/follow up.    Route to PCP, unless PCP no longer here, then physician they are seeing next.

## 2025-06-27 DIAGNOSIS — I65.21 STENOSIS OF RIGHT CAROTID ARTERY: ICD-10-CM

## 2025-06-27 DIAGNOSIS — I63.81 LEFT SIDED LACUNAR INFARCTION (HCC): ICD-10-CM

## 2025-06-27 NOTE — TELEPHONE ENCOUNTER
Requested medication(s) are due for refill today: Yes  **If antibiotic or given during sick visit, contact patient to discuss current symptoms.   **Confirm prescribing provider    LOV:  6/3/2025  **If longer then 1 year, contact patient to schedule annual PRIOR to refilling. Once scheduled, adjust refill for 30 days, no refills.  **Update CareEverywhere to confirm not being seen elsewhere    NOV:  12/18/2025    Is patient due for annual visit: No  **If future appointment, adjust to annual/follow up.  ** No appointment call to schedule annual/follow up.    Route to PCP, unless PCP no longer here, then physician they are seeing next.

## 2025-06-30 RX ORDER — CLOPIDOGREL BISULFATE 75 MG/1
75 TABLET ORAL DAILY
Qty: 90 TABLET | Refills: 1 | Status: SHIPPED | OUTPATIENT
Start: 2025-06-30

## 2025-07-07 DIAGNOSIS — I50.32 CHRONIC DIASTOLIC HEART FAILURE WITH PRESERVED EJECTION FRACTION (HCC): ICD-10-CM

## 2025-07-08 DIAGNOSIS — I61.9 HEMORRHAGIC STROKE (HCC): ICD-10-CM

## 2025-07-08 DIAGNOSIS — I10 HYPERTENSION: ICD-10-CM

## 2025-07-08 RX ORDER — FUROSEMIDE 20 MG/1
20 TABLET ORAL EVERY OTHER DAY
Qty: 45 TABLET | Refills: 1 | Status: SHIPPED | OUTPATIENT
Start: 2025-07-08

## 2025-07-08 RX ORDER — SPIRONOLACTONE 25 MG/1
12.5 TABLET ORAL DAILY
Qty: 45 TABLET | Refills: 1 | Status: SHIPPED | OUTPATIENT
Start: 2025-07-08

## 2025-07-10 DIAGNOSIS — I63.81 LEFT SIDED LACUNAR INFARCTION (HCC): ICD-10-CM

## 2025-07-10 DIAGNOSIS — E78.5 HYPERLIPIDEMIA: ICD-10-CM

## 2025-07-10 DIAGNOSIS — I61.9 HEMORRHAGIC STROKE (HCC): ICD-10-CM

## 2025-07-10 RX ORDER — ATORVASTATIN CALCIUM 40 MG/1
40 TABLET, FILM COATED ORAL
Qty: 90 TABLET | Refills: 1 | Status: SHIPPED | OUTPATIENT
Start: 2025-07-10

## 2025-07-10 RX ORDER — ATORVASTATIN CALCIUM 40 MG/1
40 TABLET, FILM COATED ORAL
Qty: 90 TABLET | Refills: 1 | Status: CANCELLED | OUTPATIENT
Start: 2025-07-10

## 2025-07-10 RX ORDER — AMLODIPINE BESYLATE 10 MG/1
10 TABLET ORAL DAILY
Qty: 90 TABLET | Refills: 1 | Status: SHIPPED | OUTPATIENT
Start: 2025-07-10

## 2025-07-10 NOTE — TELEPHONE ENCOUNTER
Requested medication(s) are due for refill today: Yes  **If antibiotic or given during sick visit, contact patient to discuss current symptoms.   **Confirm prescribing provider    LOV:  6/3/25  **If longer then 1 year, contact patient to schedule annual PRIOR to refilling. Once scheduled, adjust refill for 30 days, no refills.  **Update CareEverywhere to confirm not being seen elsewhere    NOV:  12/18/2025    Is patient due for annual visit: No  **If future appointment, adjust to annual/follow up.  ** No appointment call to schedule annual/follow up.    Route to PCP, unless PCP no longer here, then physician they are seeing next.